# Patient Record
Sex: FEMALE | Race: WHITE | HISPANIC OR LATINO | Employment: FULL TIME | URBAN - METROPOLITAN AREA
[De-identification: names, ages, dates, MRNs, and addresses within clinical notes are randomized per-mention and may not be internally consistent; named-entity substitution may affect disease eponyms.]

---

## 2017-01-05 RX ORDER — VALACYCLOVIR HYDROCHLORIDE 1 G/1
TABLET, FILM COATED ORAL
Qty: 30 TABLET | Refills: 3 | Status: SHIPPED | OUTPATIENT
Start: 2017-01-05 | End: 2018-03-07 | Stop reason: SDUPTHER

## 2017-02-08 ENCOUNTER — APPOINTMENT (OUTPATIENT)
Dept: GENERAL RADIOLOGY | Facility: HOSPITAL | Age: 52
End: 2017-02-08

## 2017-02-08 PROCEDURE — 71020 XR CHEST 2 VW: CPT | Performed by: GENERAL PRACTICE

## 2017-02-08 PROCEDURE — 71020 HC CHEST PA AND LATERAL: CPT | Performed by: GENERAL PRACTICE

## 2017-04-04 RX ORDER — MELOXICAM 15 MG/1
TABLET ORAL
Qty: 90 TABLET | Refills: 1 | Status: CANCELLED | OUTPATIENT
Start: 2017-04-04

## 2017-04-04 RX ORDER — MELOXICAM 15 MG/1
15 TABLET ORAL DAILY
Qty: 90 TABLET | Refills: 2 | Status: SHIPPED | OUTPATIENT
Start: 2017-04-04 | End: 2017-05-09 | Stop reason: SDUPTHER

## 2017-05-09 ENCOUNTER — TELEPHONE (OUTPATIENT)
Dept: FAMILY MEDICINE CLINIC | Facility: CLINIC | Age: 52
End: 2017-05-09

## 2017-05-09 RX ORDER — CYCLOBENZAPRINE HCL 10 MG
10 TABLET ORAL 3 TIMES DAILY PRN
Qty: 270 TABLET | Refills: 0 | OUTPATIENT
Start: 2017-05-09 | End: 2019-08-06

## 2017-05-09 RX ORDER — MELOXICAM 15 MG/1
15 TABLET ORAL DAILY
Qty: 90 TABLET | Refills: 2 | Status: SHIPPED | OUTPATIENT
Start: 2017-05-09 | End: 2018-02-03 | Stop reason: SDUPTHER

## 2017-05-09 RX ORDER — CYANOCOBALAMIN 1000 UG/ML
1000 INJECTION, SOLUTION INTRAMUSCULAR; SUBCUTANEOUS 2 TIMES WEEKLY
Qty: 75 ML | Refills: 1 | Status: SHIPPED | OUTPATIENT
Start: 2017-05-11 | End: 2021-09-03 | Stop reason: SDUPTHER

## 2017-09-01 ENCOUNTER — OFFICE VISIT (OUTPATIENT)
Dept: FAMILY MEDICINE CLINIC | Facility: CLINIC | Age: 52
End: 2017-09-01

## 2017-09-01 VITALS
OXYGEN SATURATION: 98 % | HEIGHT: 66 IN | SYSTOLIC BLOOD PRESSURE: 122 MMHG | TEMPERATURE: 98.6 F | BODY MASS INDEX: 31.76 KG/M2 | HEART RATE: 78 BPM | DIASTOLIC BLOOD PRESSURE: 80 MMHG | WEIGHT: 197.6 LBS

## 2017-09-01 DIAGNOSIS — R63.5 WEIGHT GAIN: ICD-10-CM

## 2017-09-01 DIAGNOSIS — K59.00 CONSTIPATION, UNSPECIFIED CONSTIPATION TYPE: ICD-10-CM

## 2017-09-01 DIAGNOSIS — F33.0 MILD EPISODE OF RECURRENT MAJOR DEPRESSIVE DISORDER (HCC): Primary | ICD-10-CM

## 2017-09-01 PROCEDURE — 99214 OFFICE O/P EST MOD 30 MIN: CPT | Performed by: NURSE PRACTITIONER

## 2017-09-01 RX ORDER — DESVENLAFAXINE SUCCINATE 50 MG/1
50 TABLET, EXTENDED RELEASE ORAL DAILY
Qty: 30 TABLET | Refills: 6 | Status: SHIPPED | OUTPATIENT
Start: 2017-09-01 | End: 2018-03-23

## 2017-09-01 RX ORDER — PHENTERMINE HYDROCHLORIDE 37.5 MG/1
37.5 TABLET ORAL
Qty: 90 TABLET | Refills: 0 | Status: SHIPPED | OUTPATIENT
Start: 2017-09-01 | End: 2018-03-23

## 2017-09-01 NOTE — PROGRESS NOTES
"Subjective   Alecia Hayes is a 51 y.o. female.     History of Present Illness   Depression: Patient complains of depression. She complains of depressed mood, difficulty concentrating, fatigue, feelings of worthlessness/guilt, hopelessness and weight gain. Onset was approximately several months ago, unchanged since that time.  She denies current suicidal and homicidal plan or intent.   Family history significant for no psychiatric illness.Possible organic causes contributing are: none.  Risk factors: none Previous treatment includes none.    Also still experiencing constipation.  Used Linzess and it really helped but caused diarrhea at the mid level dose. OTC are not helping    Also having a large amount of weight gain and she understanding that it is related to depression.  Would like phentermine to help with this    The following portions of the patient's history were reviewed and updated as appropriate: allergies, current medications, past social history and problem list.    Review of Systems   Constitutional: Positive for unexpected weight change.   All other systems reviewed and are negative.      Objective   /80 (BP Location: Right arm, Patient Position: Sitting)  Pulse 78  Temp 98.6 °F (37 °C)  Ht 65.5\" (166.4 cm)  Wt 197 lb 9.6 oz (89.6 kg)  SpO2 98%  BMI 32.38 kg/m2  Physical Exam   Constitutional: She is oriented to person, place, and time. Vital signs are normal. She appears well-developed and well-nourished. No distress.   HENT:   Head: Normocephalic.   Cardiovascular: Normal rate, regular rhythm and normal heart sounds.    Pulmonary/Chest: Effort normal and breath sounds normal.   Neurological: She is alert and oriented to person, place, and time. Gait normal.   Psychiatric: She has a normal mood and affect. Her behavior is normal. Judgment and thought content normal.   Vitals reviewed.      Assessment/Plan   Problem List Items Addressed This Visit        Digestive    Constipation    " Relevant Medications    linaclotide (LINZESS) 72 MCG capsule capsule       Other    Weight gain    Relevant Medications    phentermine (ADIPEX-P) 37.5 MG tablet    Mild episode of recurrent major depressive disorder - Primary    Relevant Medications    desvenlafaxine (PRISTIQ) 50 MG 24 hr tablet    phentermine (ADIPEX-P) 37.5 MG tablet           rto in 3 mons

## 2017-09-11 ENCOUNTER — TELEPHONE (OUTPATIENT)
Dept: FAMILY MEDICINE CLINIC | Facility: CLINIC | Age: 52
End: 2017-09-11

## 2017-09-11 RX ORDER — SULFAMETHOXAZOLE AND TRIMETHOPRIM 800; 160 MG/1; MG/1
1 TABLET ORAL 2 TIMES DAILY
Qty: 10 TABLET | Refills: 0 | Status: SHIPPED | OUTPATIENT
Start: 2017-09-11 | End: 2018-03-23

## 2017-09-11 NOTE — TELEPHONE ENCOUNTER
Patient left message asking for an antibiotic for a UTI patient says she has talked to matt about it before

## 2017-10-04 RX ORDER — AMOXICILLIN 875 MG/1
TABLET, COATED ORAL
Qty: 20 TABLET | Refills: 0 | OUTPATIENT
Start: 2017-10-04

## 2017-10-10 ENCOUNTER — TELEPHONE (OUTPATIENT)
Dept: FAMILY MEDICINE CLINIC | Facility: CLINIC | Age: 52
End: 2017-10-10

## 2017-12-19 ENCOUNTER — TELEPHONE (OUTPATIENT)
Dept: FAMILY MEDICINE CLINIC | Facility: CLINIC | Age: 52
End: 2017-12-19

## 2017-12-19 NOTE — TELEPHONE ENCOUNTER
Patient left message stating her antidepressant is causing her to have a hard time focusing and forgetfulness. She is on pristiq she is wondering if she can be prescribed something different?

## 2017-12-20 RX ORDER — BUPROPION HYDROCHLORIDE 150 MG/1
150 TABLET ORAL DAILY
Qty: 90 TABLET | Refills: 3 | Status: SHIPPED | OUTPATIENT
Start: 2017-12-20 | End: 2018-03-23

## 2018-02-05 RX ORDER — MELOXICAM 15 MG/1
TABLET ORAL
Qty: 90 TABLET | Refills: 0 | Status: SHIPPED | OUTPATIENT
Start: 2018-02-05 | End: 2018-10-31 | Stop reason: SDUPTHER

## 2018-03-08 RX ORDER — VALACYCLOVIR HYDROCHLORIDE 1 G/1
TABLET, FILM COATED ORAL
Qty: 30 TABLET | Refills: 3 | Status: SHIPPED | OUTPATIENT
Start: 2018-03-08 | End: 2018-03-20 | Stop reason: SDUPTHER

## 2018-03-20 RX ORDER — VALACYCLOVIR HYDROCHLORIDE 1 G/1
2000 TABLET, FILM COATED ORAL EVERY 12 HOURS
Qty: 60 TABLET | Refills: 3 | Status: SHIPPED | OUTPATIENT
Start: 2018-03-20 | End: 2018-03-23

## 2018-03-23 ENCOUNTER — OFFICE VISIT (OUTPATIENT)
Dept: FAMILY MEDICINE CLINIC | Facility: CLINIC | Age: 53
End: 2018-03-23

## 2018-03-23 VITALS
WEIGHT: 199.8 LBS | HEIGHT: 66 IN | BODY MASS INDEX: 32.11 KG/M2 | OXYGEN SATURATION: 97 % | HEART RATE: 86 BPM | SYSTOLIC BLOOD PRESSURE: 142 MMHG | DIASTOLIC BLOOD PRESSURE: 84 MMHG | TEMPERATURE: 98.6 F

## 2018-03-23 DIAGNOSIS — Z00.00 ROUTINE GENERAL MEDICAL EXAMINATION AT A HEALTH CARE FACILITY: ICD-10-CM

## 2018-03-23 DIAGNOSIS — G47.00 INSOMNIA, UNSPECIFIED TYPE: ICD-10-CM

## 2018-03-23 DIAGNOSIS — Z12.39 BREAST CANCER SCREENING: ICD-10-CM

## 2018-03-23 DIAGNOSIS — R22.1 NODULE OF NECK: ICD-10-CM

## 2018-03-23 DIAGNOSIS — B00.1 COLD SORE: ICD-10-CM

## 2018-03-23 DIAGNOSIS — Z12.11 COLON CANCER SCREENING: Primary | ICD-10-CM

## 2018-03-23 DIAGNOSIS — R63.5 WEIGHT GAIN: ICD-10-CM

## 2018-03-23 DIAGNOSIS — F33.0 MILD EPISODE OF RECURRENT MAJOR DEPRESSIVE DISORDER (HCC): ICD-10-CM

## 2018-03-23 PROCEDURE — 99214 OFFICE O/P EST MOD 30 MIN: CPT | Performed by: NURSE PRACTITIONER

## 2018-03-23 RX ORDER — VALACYCLOVIR HYDROCHLORIDE 500 MG/1
500 TABLET, FILM COATED ORAL DAILY
Qty: 90 TABLET | Refills: 3 | OUTPATIENT
Start: 2018-03-23 | End: 2019-08-06

## 2018-03-23 RX ORDER — PHENTERMINE HYDROCHLORIDE 37.5 MG/1
37.5 TABLET ORAL
Qty: 90 TABLET | Refills: 0 | OUTPATIENT
Start: 2018-03-23 | End: 2019-06-09

## 2018-03-23 RX ORDER — TRAZODONE HYDROCHLORIDE 150 MG/1
150 TABLET ORAL NIGHTLY
Qty: 90 TABLET | Refills: 3 | OUTPATIENT
Start: 2018-03-23 | End: 2019-08-06

## 2018-03-23 NOTE — PROGRESS NOTES
"Subjective   Alecia Hayes is a 52 y.o. female.     History of Present Illness   About 1 month ago patient felt a bump on her left clavicle and she thinks it has been getting bigger slowly. It is tender.     she has an ongoing issue with cold sores she takes her bowel cycle of beer 2000 mg twice a day which does help get rid of it but she easily gets some right back she's had 3 this month.    She is gaining her weight back and she's requesting have her phentermine refilled to help her lose weight and work well for her last time.    She's having trouble sleeping and also with some anxiety and depression and her friend takes trazodone works well for all of those things are hurting she's wondering if that's a medication that she could try for these issues that she is having.          The following portions of the patient's history were reviewed and updated as appropriate: allergies, current medications, past social history and problem list.    Review of Systems   Constitutional: Positive for fatigue and unexpected weight change.   All other systems reviewed and are negative.      Objective   /84 (BP Location: Right arm, Patient Position: Sitting)   Pulse 86   Temp 98.6 °F (37 °C)   Ht 166.4 cm (65.51\")   Wt 90.6 kg (199 lb 12.8 oz)   SpO2 97%   BMI 32.73 kg/m²   Physical Exam   Constitutional: She is oriented to person, place, and time. Vital signs are normal. She appears well-developed and well-nourished. No distress.   HENT:   Head: Normocephalic.   Cardiovascular: Normal rate, regular rhythm and normal heart sounds.    Pulmonary/Chest: Effort normal and breath sounds normal.   Neurological: She is alert and oriented to person, place, and time. Gait normal.   Psychiatric: She has a normal mood and affect. Her behavior is normal. Judgment and thought content normal.   Vitals reviewed.      Assessment/Plan   Problem List Items Addressed This Visit        Other    Insomnia    Relevant Medications    " traZODone (DESYREL) 150 MG tablet    Cold sore    Relevant Medications    valACYclovir (VALTREX) 500 MG tablet    Weight gain    Relevant Medications    phentermine (ADIPEX-P) 37.5 MG tablet    Mild episode of recurrent major depressive disorder    Relevant Medications    traZODone (DESYREL) 150 MG tablet    phentermine (ADIPEX-P) 37.5 MG tablet    Breast cancer screening - Primary    Relevant Orders    Mammo Screening Bilateral With CAD    Routine general medical examination at a health care facility    Relevant Orders    Ambulatory Referral to Dermatology    Nodule of neck    Relevant Orders    CT soft tissue neck w contrast      Other Visit Diagnoses    None.       Schedule pap come fasting  Follow up after CT

## 2018-03-27 DIAGNOSIS — R22.1 NODULE OF NECK: Primary | ICD-10-CM

## 2018-03-30 ENCOUNTER — HOSPITAL ENCOUNTER (OUTPATIENT)
Dept: ULTRASOUND IMAGING | Facility: HOSPITAL | Age: 53
Discharge: HOME | End: 2018-03-30

## 2018-03-30 ENCOUNTER — HOSPITAL ENCOUNTER (OUTPATIENT)
Dept: MAMMOGRAPHY | Facility: HOSPITAL | Age: 53
Discharge: HOME | End: 2018-03-30
Admitting: NURSE PRACTITIONER

## 2018-03-30 DIAGNOSIS — Z12.39 BREAST CANCER SCREENING: ICD-10-CM

## 2018-03-30 PROCEDURE — 77067 SCR MAMMO BI INCL CAD: CPT

## 2018-03-30 PROCEDURE — 76536 US EXAM OF HEAD AND NECK: CPT

## 2018-04-06 ENCOUNTER — HOSPITAL ENCOUNTER (OUTPATIENT)
Dept: CT IMAGING | Facility: HOSPITAL | Age: 53
Discharge: HOME | End: 2018-04-06
Admitting: NURSE PRACTITIONER

## 2018-04-06 ENCOUNTER — PROCEDURE VISIT (OUTPATIENT)
Dept: FAMILY MEDICINE CLINIC | Facility: CLINIC | Age: 53
End: 2018-04-06

## 2018-04-06 VITALS
HEIGHT: 66 IN | DIASTOLIC BLOOD PRESSURE: 78 MMHG | BODY MASS INDEX: 31.82 KG/M2 | SYSTOLIC BLOOD PRESSURE: 122 MMHG | OXYGEN SATURATION: 98 % | TEMPERATURE: 98.6 F | RESPIRATION RATE: 14 BRPM | WEIGHT: 198 LBS | HEART RATE: 78 BPM

## 2018-04-06 DIAGNOSIS — Z13.6 SCREENING FOR ISCHEMIC HEART DISEASE: ICD-10-CM

## 2018-04-06 DIAGNOSIS — Z77.21 EXPOSURE TO BLOOD OR BODY FLUID: ICD-10-CM

## 2018-04-06 DIAGNOSIS — E61.1 IRON DEFICIENCY: ICD-10-CM

## 2018-04-06 DIAGNOSIS — Z01.419 PAP TEST, AS PART OF ROUTINE GYNECOLOGICAL EXAMINATION: ICD-10-CM

## 2018-04-06 DIAGNOSIS — Z13.1 SCREENING FOR DIABETES MELLITUS: ICD-10-CM

## 2018-04-06 DIAGNOSIS — Z83.49 FAMILY HISTORY OF HYPOTHYROIDISM: ICD-10-CM

## 2018-04-06 DIAGNOSIS — E53.8 VITAMIN B12 DEFICIENCY: ICD-10-CM

## 2018-04-06 DIAGNOSIS — R82.90 ABNORMAL URINE ODOR: Primary | ICD-10-CM

## 2018-04-06 DIAGNOSIS — R22.1 NODULE OF NECK: ICD-10-CM

## 2018-04-06 PROBLEM — Z00.00 ROUTINE GENERAL MEDICAL EXAMINATION AT A HEALTH CARE FACILITY: Status: RESOLVED | Noted: 2018-03-23 | Resolved: 2018-04-06

## 2018-04-06 PROBLEM — Z12.39 BREAST CANCER SCREENING: Status: RESOLVED | Noted: 2018-03-23 | Resolved: 2018-04-06

## 2018-04-06 PROCEDURE — 70491 CT SOFT TISSUE NECK W/DYE: CPT

## 2018-04-06 PROCEDURE — 82565 ASSAY OF CREATININE: CPT

## 2018-04-06 PROCEDURE — 25010000002 IOPAMIDOL 61 % SOLUTION: Performed by: NURSE PRACTITIONER

## 2018-04-06 PROCEDURE — 99396 PREV VISIT EST AGE 40-64: CPT | Performed by: NURSE PRACTITIONER

## 2018-04-06 RX ADMIN — IOPAMIDOL 80 ML: 612 INJECTION, SOLUTION INTRAVENOUS at 10:20

## 2018-04-06 NOTE — PROGRESS NOTES
"Subjective   Alecia Hayes is a 52 y.o. female.     History of Present Illness   Well Adult Physical: Patient here for a comprehensive physical exam.The patient reports no problems  Do you take any herbs or supplements that were not prescribed by a doctor? no Are you taking calcium supplements? no Are you taking aspirin daily? no        The following portions of the patient's history were reviewed and updated as appropriate: allergies, current medications, past social history and problem list.    Review of Systems   Genitourinary: Positive for dysuria.   All other systems reviewed and are negative.      Objective   /78 (BP Location: Left arm, Patient Position: Sitting, Cuff Size: Adult)   Pulse 78   Temp 98.6 °F (37 °C) (Oral)   Resp 14   Ht 166.4 cm (65.51\")   Wt 89.8 kg (198 lb)   SpO2 98%   BMI 32.44 kg/m²   Physical Exam   Constitutional: She is oriented to person, place, and time. She appears well-developed and well-nourished.   Neck: No thyromegaly present.   Cardiovascular: Normal rate, regular rhythm and normal heart sounds.  Exam reveals no gallop.    No murmur heard.  Pulmonary/Chest: Effort normal and breath sounds normal. She has no wheezes. She has no rales. She exhibits no tenderness. Right breast exhibits no mass, no nipple discharge and no skin change. Left breast exhibits no mass, no nipple discharge and no skin change.   Abdominal: There is no tenderness.   Genitourinary: Vagina normal and uterus normal. Pelvic exam was performed with patient supine. There is no rash or lesion on the right labia. There is no rash or lesion on the left labia. Uterus is not enlarged and not tender. Cervix exhibits no motion tenderness, no discharge and no friability. Right adnexum displays no mass, no tenderness and no fullness. Left adnexum displays no mass, no tenderness and no fullness. No erythema, tenderness or bleeding in the vagina. No vaginal discharge found.   Lymphadenopathy:        Right: No " inguinal adenopathy present.        Left: No inguinal adenopathy present.   Neurological: She is alert and oriented to person, place, and time.   Skin: Skin is warm. No rash noted.   Psychiatric: She has a normal mood and affect. Her behavior is normal. Judgment and thought content normal.   Vitals reviewed.      Assessment/Plan   Problem List Items Addressed This Visit        Digestive    Iron deficiency    Relevant Orders    CBC & Differential    Iron Profile    Vitamin B12 deficiency    Relevant Orders    Vitamin B12 & Folate       Other    Screening for ischemic heart disease    Relevant Orders    Lipid Panel With LDL / HDL Ratio    Nodule of neck    Relevant Orders    TSH    T4, Free    Abnormal urine odor - Primary    Pap test, as part of routine gynecological examination    Exposure to blood or body fluid    Relevant Orders    Hepatitis panel, acute    HIV 2 Antibody Screen w reflex    HSV 1 and 2 IgM, Abs, Indirect    HSV 1 and 2-Specific Ab, IgG    RPR    Chlamydia trachomatis, Neisseria gonorrhoeae, PCR - Urine, Urine, Clean Catch    Family history of hypothyroidism    Relevant Orders    TSH    T4, Free      Other Visit Diagnoses    None.          follow up after labs

## 2018-04-07 LAB — CREAT BLDA-MCNC: 1 MG/DL (ref 0.6–1.3)

## 2018-04-10 ENCOUNTER — TELEPHONE (OUTPATIENT)
Dept: FAMILY MEDICINE CLINIC | Facility: CLINIC | Age: 53
End: 2018-04-10

## 2018-04-10 NOTE — TELEPHONE ENCOUNTER
Patient received her ct results she is still concerned about the left side knot she feels. She wants to know if the lymph node that was found on the right side could have anything to do with what she is feeling on the left? She is wondering if she should she go to an ENT because she is not ok with just being told it is nothing on a CT scan.

## 2018-04-11 LAB
C TRACH RRNA CVX QL NAA+PROBE: NEGATIVE
CYTOLOGIST CVX/VAG CYTO: ABNORMAL
CYTOLOGY CVX/VAG DOC THIN PREP: ABNORMAL
DX ICD CODE: ABNORMAL
DX ICD CODE: ABNORMAL
FOLATE SERPL-MCNC: 6.92 NG/ML (ref 4.78–24.2)
HIV 1 & 2 AB SER-IMP: ABNORMAL
HPV I/H RISK 1 DNA CVX QL PROBE+SIG AMP: POSITIVE
N GONORRHOEA RRNA CVX QL NAA+PROBE: NEGATIVE
OTHER STN SPEC: ABNORMAL
PATH REPORT.FINAL DX SPEC: ABNORMAL
PATHOLOGIST CVX/VAG CYTO: ABNORMAL
STAT OF ADQ CVX/VAG CYTO-IMP: ABNORMAL
T4 FREE SERPL-MCNC: 1.21 NG/DL (ref 0.93–1.7)
TSH SERPL DL<=0.005 MIU/L-ACNC: 5.26 MIU/ML (ref 0.27–4.2)
VIT B12 SERPL-MCNC: >2000 PG/ML (ref 211–946)

## 2018-04-11 NOTE — TELEPHONE ENCOUNTER
The lymph node does't have anything to do with the left side and yes we can refer to ent for an opinion

## 2018-04-16 ENCOUNTER — TELEPHONE (OUTPATIENT)
Dept: FAMILY MEDICINE CLINIC | Facility: CLINIC | Age: 53
End: 2018-04-16

## 2018-04-16 LAB
ALBUMIN SERPL-MCNC: 4.6 G/DL (ref 3.5–5.2)
ALBUMIN/GLOB SERPL: 1.9 G/DL
ALP SERPL-CCNC: 93 U/L (ref 39–117)
ALT SERPL-CCNC: 17 U/L (ref 1–33)
AST SERPL-CCNC: 20 U/L (ref 1–32)
BASOPHILS # BLD AUTO: 0.05 10*3/MM3 (ref 0–0.2)
BASOPHILS NFR BLD AUTO: 0.8 % (ref 0–1.5)
BILIRUB SERPL-MCNC: 0.3 MG/DL (ref 0.1–1.2)
BUN SERPL-MCNC: 15 MG/DL (ref 6–20)
BUN/CREAT SERPL: 20.5 (ref 7–25)
C TRACH RRNA SPEC QL NAA+PROBE: NEGATIVE
CALCIUM SERPL-MCNC: 9.3 MG/DL (ref 8.6–10.5)
CHLORIDE SERPL-SCNC: 101 MMOL/L (ref 98–107)
CHOLEST SERPL-MCNC: 208 MG/DL (ref 0–200)
CO2 SERPL-SCNC: 28.2 MMOL/L (ref 22–29)
CREAT SERPL-MCNC: 0.73 MG/DL (ref 0.57–1)
EOSINOPHIL # BLD AUTO: 0.33 10*3/MM3 (ref 0–0.7)
EOSINOPHIL NFR BLD AUTO: 5.2 % (ref 0.3–6.2)
ERYTHROCYTE [DISTWIDTH] IN BLOOD BY AUTOMATED COUNT: 15 % (ref 11.7–13)
GFR SERPLBLD CREATININE-BSD FMLA CKD-EPI: 101 ML/MIN/1.73
GFR SERPLBLD CREATININE-BSD FMLA CKD-EPI: 83 ML/MIN/1.73
GLOBULIN SER CALC-MCNC: 2.4 GM/DL
GLUCOSE SERPL-MCNC: 86 MG/DL (ref 65–99)
HAV IGM SERPL QL IA: NEGATIVE
HBV CORE IGM SERPL QL IA: NEGATIVE
HBV SURFACE AG SERPL QL IA: NEGATIVE
HCT VFR BLD AUTO: 46 % (ref 35.6–45.5)
HCV AB S/CO SERPL IA: <0.1 S/CO RATIO (ref 0–0.9)
HDLC SERPL-MCNC: 81 MG/DL (ref 40–60)
HGB BLD-MCNC: 14.3 G/DL (ref 11.9–15.5)
HIV 2 AB SERPL QL IA: NEGATIVE
HSV1 IGG SER IA-ACNC: 59.9 INDEX (ref 0–0.9)
HSV1 IGM TITR SER IF: NORMAL TITER
HSV2 IGG SER IA-ACNC: <0.91 INDEX (ref 0–0.9)
HSV2 IGM TITR SER IF: NORMAL TITER
IMM GRANULOCYTES # BLD: 0 10*3/MM3 (ref 0–0.03)
IMM GRANULOCYTES NFR BLD: 0 % (ref 0–0.5)
IRON SATN MFR SERPL: 20 % (ref 20–50)
IRON SERPL-MCNC: 104 MCG/DL (ref 37–145)
LDLC SERPL CALC-MCNC: 108 MG/DL (ref 0–100)
LDLC/HDLC SERPL: 1.33 {RATIO}
LYMPHOCYTES # BLD AUTO: 1.99 10*3/MM3 (ref 0.9–4.8)
LYMPHOCYTES NFR BLD AUTO: 31.5 % (ref 19.6–45.3)
MCH RBC QN AUTO: 31.2 PG (ref 26.9–32)
MCHC RBC AUTO-ENTMCNC: 31.1 G/DL (ref 32.4–36.3)
MCV RBC AUTO: 100.4 FL (ref 80.5–98.2)
MONOCYTES # BLD AUTO: 0.51 10*3/MM3 (ref 0.2–1.2)
MONOCYTES NFR BLD AUTO: 8.1 % (ref 5–12)
N GONORRHOEA RRNA SPEC QL NAA+PROBE: NEGATIVE
NEUTROPHILS # BLD AUTO: 3.43 10*3/MM3 (ref 1.9–8.1)
NEUTROPHILS NFR BLD AUTO: 54.4 % (ref 42.7–76)
PLATELET # BLD AUTO: 288 10*3/MM3 (ref 140–500)
POTASSIUM SERPL-SCNC: 4.5 MMOL/L (ref 3.5–5.2)
PROT SERPL-MCNC: 7 G/DL (ref 6–8.5)
RBC # BLD AUTO: 4.58 10*6/MM3 (ref 3.9–5.2)
RPR SER QL: NORMAL
SODIUM SERPL-SCNC: 143 MMOL/L (ref 136–145)
TIBC SERPL-MCNC: 526 MCG/DL
TRIGL SERPL-MCNC: 95 MG/DL (ref 0–150)
UIBC SERPL-MCNC: 422 MCG/DL
VLDLC SERPL CALC-MCNC: 19 MG/DL (ref 5–40)
WBC # BLD AUTO: 6.31 10*3/MM3 (ref 4.5–10.7)

## 2018-04-17 RX ORDER — LEVOTHYROXINE SODIUM 0.03 MG/1
25 TABLET ORAL DAILY
Qty: 30 TABLET | Refills: 3 | Status: SHIPPED | OUTPATIENT
Start: 2018-04-17 | End: 2018-04-17 | Stop reason: SDUPTHER

## 2018-04-17 RX ORDER — LEVOTHYROXINE SODIUM 0.03 MG/1
25 TABLET ORAL DAILY
Qty: 30 TABLET | Refills: 3 | Status: SHIPPED | OUTPATIENT
Start: 2018-04-17 | End: 2018-08-14 | Stop reason: SDUPTHER

## 2018-04-23 DIAGNOSIS — B97.7 HPV (HUMAN PAPILLOMA VIRUS) INFECTION: Primary | ICD-10-CM

## 2018-04-23 DIAGNOSIS — R87.619 ABNORMAL CERVICAL PAPANICOLAOU SMEAR, UNSPECIFIED ABNORMAL PAP FINDING: ICD-10-CM

## 2018-04-27 ENCOUNTER — PREP FOR SURGERY (OUTPATIENT)
Dept: OTHER | Facility: HOSPITAL | Age: 53
End: 2018-04-27

## 2018-04-27 DIAGNOSIS — Z80.0 FH: COLON CANCER: Primary | ICD-10-CM

## 2018-05-01 ENCOUNTER — TELEPHONE (OUTPATIENT)
Dept: FAMILY MEDICINE CLINIC | Facility: CLINIC | Age: 53
End: 2018-05-01

## 2018-05-01 NOTE — TELEPHONE ENCOUNTER
Patient called and said that she recently seen matt and is wanting to know if matt will prescribe her xanax to take as needed because she is under a lot of stress and breaking out in cold sores and wanting something to help with her anxiety

## 2018-05-01 NOTE — TELEPHONE ENCOUNTER
Called and spoke to patient and she said that she will have to call back after she looks at the schedule

## 2018-05-18 ENCOUNTER — OUTSIDE FACILITY SERVICE (OUTPATIENT)
Dept: GASTROENTEROLOGY | Facility: CLINIC | Age: 53
End: 2018-05-18

## 2018-05-18 PROCEDURE — 45385 COLONOSCOPY W/LESION REMOVAL: CPT | Performed by: INTERNAL MEDICINE

## 2018-05-25 ENCOUNTER — TELEPHONE (OUTPATIENT)
Dept: GASTROENTEROLOGY | Facility: CLINIC | Age: 53
End: 2018-05-25

## 2018-05-29 ENCOUNTER — TELEPHONE (OUTPATIENT)
Dept: GASTROENTEROLOGY | Facility: CLINIC | Age: 53
End: 2018-05-29

## 2018-05-29 NOTE — TELEPHONE ENCOUNTER
Called pt and advised per Dr Malik that the polyp that was removed was benign and she recommends a repeat c/s in 5 yrs. Pt verb understanding.     C/s placed in recall for 05/18/2023.

## 2018-05-29 NOTE — TELEPHONE ENCOUNTER
----- Message from Cassandra Cazares RN sent at 5/29/2018  3:58 PM EDT -----  Regarding: FW: C/S REPORT   Contact: [phone]  [place] pt   ----- Message -----  From: Ruchi Krueger  Sent: 5/29/2018   3:17 PM  To: Cassandra Cazares RN, Ijeoma Benitez RN  Subject: C/S REPORT                                       PT CALLED FOR C/S PATH REPORT

## 2018-08-14 RX ORDER — LEVOTHYROXINE SODIUM 0.03 MG/1
TABLET ORAL
Qty: 30 TABLET | Refills: 2 | Status: SHIPPED | OUTPATIENT
Start: 2018-08-14 | End: 2018-11-21 | Stop reason: SDUPTHER

## 2018-10-31 RX ORDER — MELOXICAM 15 MG/1
15 TABLET ORAL DAILY
Qty: 90 TABLET | Refills: 0 | Status: SHIPPED | OUTPATIENT
Start: 2018-10-31 | End: 2021-09-03 | Stop reason: SDUPTHER

## 2018-11-26 RX ORDER — LEVOTHYROXINE SODIUM 0.03 MG/1
TABLET ORAL
Qty: 30 TABLET | Refills: 1 | Status: SHIPPED | OUTPATIENT
Start: 2018-11-26 | End: 2019-01-23 | Stop reason: SDUPTHER

## 2019-01-24 RX ORDER — LEVOTHYROXINE SODIUM 0.03 MG/1
TABLET ORAL
Qty: 30 TABLET | Refills: 0 | Status: SHIPPED | OUTPATIENT
Start: 2019-01-24 | End: 2021-08-27

## 2019-01-28 RX ORDER — LEVOTHYROXINE SODIUM 0.03 MG/1
TABLET ORAL
Qty: 30 TABLET | Refills: 0 | Status: SHIPPED | OUTPATIENT
Start: 2019-01-28 | End: 2021-08-30 | Stop reason: SDUPTHER

## 2019-04-19 ENCOUNTER — APPOINTMENT (OUTPATIENT)
Dept: WOMENS IMAGING | Facility: HOSPITAL | Age: 54
End: 2019-04-19

## 2019-04-19 PROCEDURE — 77063 BREAST TOMOSYNTHESIS BI: CPT | Performed by: RADIOLOGY

## 2019-04-19 PROCEDURE — 77067 SCR MAMMO BI INCL CAD: CPT | Performed by: RADIOLOGY

## 2019-04-19 PROCEDURE — 77080 DXA BONE DENSITY AXIAL: CPT | Performed by: RADIOLOGY

## 2019-05-31 RX ORDER — VALACYCLOVIR HYDROCHLORIDE 1 G/1
TABLET, FILM COATED ORAL
Qty: 60 TABLET | Refills: 2 | OUTPATIENT
Start: 2019-05-31 | End: 2019-08-06

## 2020-08-07 ENCOUNTER — APPOINTMENT (OUTPATIENT)
Dept: WOMENS IMAGING | Facility: HOSPITAL | Age: 55
End: 2020-08-07

## 2020-08-07 PROCEDURE — 77067 SCR MAMMO BI INCL CAD: CPT | Performed by: RADIOLOGY

## 2020-08-07 PROCEDURE — 77063 BREAST TOMOSYNTHESIS BI: CPT | Performed by: RADIOLOGY

## 2020-11-06 ENCOUNTER — HOSPITAL ENCOUNTER (EMERGENCY)
Facility: HOSPITAL | Age: 55
Discharge: LEFT BEFORE DISCHARGE | End: 2020-11-06

## 2020-11-06 VITALS
BODY MASS INDEX: 29.02 KG/M2 | SYSTOLIC BLOOD PRESSURE: 165 MMHG | RESPIRATION RATE: 18 BRPM | HEART RATE: 120 BPM | TEMPERATURE: 100 F | HEIGHT: 64 IN | WEIGHT: 169.97 LBS | OXYGEN SATURATION: 97 % | DIASTOLIC BLOOD PRESSURE: 83 MMHG

## 2020-11-06 NOTE — ED NOTES
Pt told screener that she did not have time to waiting and she was going to leave without being seen      Mami Tapia RN  11/06/20 0460

## 2021-01-24 ENCOUNTER — TRANSCRIBE ORDERS (OUTPATIENT)
Dept: ADMINISTRATIVE | Facility: HOSPITAL | Age: 56
End: 2021-01-24

## 2021-01-24 DIAGNOSIS — R91.1 LUNG NODULE: Primary | ICD-10-CM

## 2021-08-24 ENCOUNTER — TELEPHONE (OUTPATIENT)
Dept: FAMILY MEDICINE CLINIC | Facility: CLINIC | Age: 56
End: 2021-08-24

## 2021-08-24 NOTE — TELEPHONE ENCOUNTER
Caller: Alecia ROMERO A    Relationship to patient: Self    Best call back number: [phone]     Patient is needing: PATIENT IS CALLING TO STATE SHE HAS A NEW PATIENT APPOINTMENT ON 08/27/21.  SHE IS GOING TO HAVE SURGERY ON 09/14/21.  SHE IS WANTING TO KNOW IF SHE CAN GET AN EKG, CBC, AND CMP AT THAT VISIT.    PLEASE ADVISE.

## 2021-08-27 ENCOUNTER — OFFICE VISIT (OUTPATIENT)
Dept: FAMILY MEDICINE CLINIC | Facility: CLINIC | Age: 56
End: 2021-08-27

## 2021-08-27 VITALS
HEIGHT: 66 IN | WEIGHT: 194.4 LBS | OXYGEN SATURATION: 98 % | BODY MASS INDEX: 31.24 KG/M2 | DIASTOLIC BLOOD PRESSURE: 70 MMHG | TEMPERATURE: 98 F | HEART RATE: 76 BPM | SYSTOLIC BLOOD PRESSURE: 146 MMHG

## 2021-08-27 DIAGNOSIS — Z13.1 SCREENING FOR DIABETES MELLITUS: ICD-10-CM

## 2021-08-27 DIAGNOSIS — Z01.818 PREOP EXAMINATION: Primary | ICD-10-CM

## 2021-08-27 DIAGNOSIS — Z13.6 SCREENING FOR ISCHEMIC HEART DISEASE: ICD-10-CM

## 2021-08-27 DIAGNOSIS — E03.9 HYPOTHYROIDISM, UNSPECIFIED TYPE: ICD-10-CM

## 2021-08-27 DIAGNOSIS — G47.00 INSOMNIA, UNSPECIFIED TYPE: ICD-10-CM

## 2021-08-27 DIAGNOSIS — E53.8 VITAMIN B12 DEFICIENCY: ICD-10-CM

## 2021-08-27 DIAGNOSIS — Z13.0 SCREENING FOR IRON DEFICIENCY ANEMIA: ICD-10-CM

## 2021-08-27 PROBLEM — R22.1 NODULE OF NECK: Status: RESOLVED | Noted: 2018-03-23 | Resolved: 2021-08-27

## 2021-08-27 PROBLEM — Z77.21 EXPOSURE TO BLOOD OR BODY FLUID: Status: RESOLVED | Noted: 2018-04-06 | Resolved: 2021-08-27

## 2021-08-27 PROBLEM — Z83.49 FAMILY HISTORY OF HYPOTHYROIDISM: Status: RESOLVED | Noted: 2018-04-06 | Resolved: 2021-08-27

## 2021-08-27 PROBLEM — Z01.419 PAP TEST, AS PART OF ROUTINE GYNECOLOGICAL EXAMINATION: Status: RESOLVED | Noted: 2018-04-06 | Resolved: 2021-08-27

## 2021-08-27 PROBLEM — R82.90 ABNORMAL URINE ODOR: Status: RESOLVED | Noted: 2018-04-06 | Resolved: 2021-08-27

## 2021-08-27 PROCEDURE — 99213 OFFICE O/P EST LOW 20 MIN: CPT | Performed by: NURSE PRACTITIONER

## 2021-08-27 PROCEDURE — 93000 ELECTROCARDIOGRAM COMPLETE: CPT | Performed by: NURSE PRACTITIONER

## 2021-08-27 RX ORDER — PAROXETINE HYDROCHLORIDE HEMIHYDRATE 12.5 MG/1
12.5 TABLET, FILM COATED, EXTENDED RELEASE ORAL DAILY
COMMUNITY
Start: 2021-06-11 | End: 2021-09-03 | Stop reason: SDUPTHER

## 2021-08-27 RX ORDER — TRAZODONE HYDROCHLORIDE 150 MG/1
150 TABLET ORAL NIGHTLY
Qty: 90 TABLET | Refills: 3 | Status: SHIPPED | OUTPATIENT
Start: 2021-08-27 | End: 2022-06-08

## 2021-08-27 RX ORDER — NITROFURANTOIN 25; 75 MG/1; MG/1
100 CAPSULE ORAL AS NEEDED
COMMUNITY
End: 2021-11-05

## 2021-08-27 NOTE — PROGRESS NOTES
"Chief Complaint  Pre-op Exam (Patient is wanting to establish primary care. She is having breast surgery and lipo on her chin she is needing an ekg, cbc, cmp ( wore mask and goggles) )    Subjective          Alecia GAYTAN YURYGARY presents to [facility]  History of Present Illness  Patient reestablishing care with me today.  I last saw her in March 2018.    Pt presenting to the office today for surgery clearance.  Breast implants replaced and varghese facelift planned on 9/17/21 by surgeon Dr. Walters.  Recent weight has been stable. Patient experiencing fear or anxiety regarding surgery: no concerns .  Significant medical history: none    Insomnia- needing trazodone refilled which she uses on a PRN basis for insomnia and works well    Objective   Vital Signs:   /70   Pulse 76   Temp 98 °F (36.7 °C)   Ht 167.6 cm (66\")   Wt 88.2 kg (194 lb 6.4 oz)   SpO2 98%   BMI 31.38 kg/m²     Physical Exam  Vitals reviewed.   Constitutional:       General: She is not in acute distress.     Appearance: She is well-developed. She is not diaphoretic.   HENT:      Head: Normocephalic and atraumatic. Hair is normal.      Right Ear: Hearing, tympanic membrane, ear canal and external ear normal. No decreased hearing noted. No drainage.      Left Ear: Hearing, tympanic membrane, ear canal and external ear normal. No decreased hearing noted.      Nose: No nasal deformity.   Eyes:      General: Lids are normal.         Right eye: No discharge.         Left eye: No discharge.      Conjunctiva/sclera: Conjunctivae normal.      Pupils: Pupils are equal, round, and reactive to light.   Neck:      Thyroid: No thyromegaly.      Vascular: No JVD.      Trachea: No tracheal deviation.   Cardiovascular:      Rate and Rhythm: Normal rate and regular rhythm.      Pulses: Normal pulses.      Heart sounds: Normal heart sounds. No murmur heard.   No friction rub. No gallop.    Pulmonary:      Effort: Pulmonary " effort is normal. No respiratory distress.      Breath sounds: Normal breath sounds. No wheezing or rales.   Chest:      Chest wall: No tenderness.   Abdominal:      General: Bowel sounds are normal. There is no distension.      Palpations: Abdomen is soft. There is no mass.      Tenderness: There is no abdominal tenderness. There is no guarding or rebound.      Hernia: No hernia is present.   Musculoskeletal:         General: No tenderness or deformity. Normal range of motion.      Cervical back: Normal range of motion and neck supple.   Lymphadenopathy:      Cervical: No cervical adenopathy.   Skin:     General: Skin is warm and dry.      Findings: No erythema or rash.   Neurological:      Mental Status: She is alert and oriented to person, place, and time.      Cranial Nerves: No cranial nerve deficit.      Motor: No abnormal muscle tone.      Coordination: Coordination normal.      Deep Tendon Reflexes: Reflexes are normal and symmetric. Reflexes normal.   Psychiatric:         Behavior: Behavior normal.         Thought Content: Thought content normal.         Judgment: Judgment normal.        Result Review :            ECG 12 Lead    Date/Time: 8/27/2021 10:02 AM  Performed by: Adan Campbell APRN  Authorized by: Adan Campbell APRN   Comparison: compared with previous ECG from 2/8/2017  Similar to previous ECG  Rhythm: sinus rhythm  Rate: normal  Conduction: conduction normal  ST Segments: ST segments normal  T Waves: T waves normal  QRS axis: normal  Other: no other findings    Clinical impression: normal ECG              Assessment and Plan    Diagnoses and all orders for this visit:    1. Preop examination (Primary)  -     ECG 12 Lead    2. Hypothyroidism, unspecified type  -     TSH    3. Screening for iron deficiency anemia  -     CBC & Differential    4. Screening for diabetes mellitus  -     Comprehensive Metabolic Panel    5. Screening for ischemic heart disease  -     Lipid Panel With LDL / HDL  Ratio    6. Vitamin B12 deficiency  -     Vitamin B12 & Folate    7. Insomnia, unspecified type  -     traZODone (DESYREL) 150 MG tablet; Take 1 tablet by mouth Every Night.  Dispense: 90 tablet; Refill: 3        Follow Up   Return if symptoms worsen or fail to improve.  Patient was given instructions and counseling regarding her condition or for health maintenance advice. Please see specific information pulled into the AVS if appropriate.     Follow up after labs   rto prn    Mask and googles worn  Will clear for surgery after seeing results

## 2021-08-28 LAB
ALBUMIN SERPL-MCNC: 4.7 G/DL (ref 3.5–5.2)
ALBUMIN/GLOB SERPL: 1.8 G/DL
ALP SERPL-CCNC: 87 U/L (ref 39–117)
ALT SERPL-CCNC: 30 U/L (ref 1–33)
AST SERPL-CCNC: 44 U/L (ref 1–32)
BASOPHILS # BLD AUTO: 0.06 10*3/MM3 (ref 0–0.2)
BASOPHILS NFR BLD AUTO: 1 % (ref 0–1.5)
BILIRUB SERPL-MCNC: 0.3 MG/DL (ref 0–1.2)
BUN SERPL-MCNC: 10 MG/DL (ref 6–20)
BUN/CREAT SERPL: 14.3 (ref 7–25)
CALCIUM SERPL-MCNC: 9.8 MG/DL (ref 8.6–10.5)
CHLORIDE SERPL-SCNC: 102 MMOL/L (ref 98–107)
CHOLEST SERPL-MCNC: 253 MG/DL (ref 0–200)
CO2 SERPL-SCNC: 27.5 MMOL/L (ref 22–29)
CREAT SERPL-MCNC: 0.7 MG/DL (ref 0.57–1)
EOSINOPHIL # BLD AUTO: 0.15 10*3/MM3 (ref 0–0.4)
EOSINOPHIL NFR BLD AUTO: 2.5 % (ref 0.3–6.2)
ERYTHROCYTE [DISTWIDTH] IN BLOOD BY AUTOMATED COUNT: 15.8 % (ref 12.3–15.4)
FOLATE SERPL-MCNC: 9.22 NG/ML (ref 4.78–24.2)
GLOBULIN SER CALC-MCNC: 2.6 GM/DL
GLUCOSE SERPL-MCNC: 83 MG/DL (ref 65–99)
HCT VFR BLD AUTO: 37.7 % (ref 34–46.6)
HDLC SERPL-MCNC: 120 MG/DL (ref 40–60)
HGB BLD-MCNC: 11.8 G/DL (ref 12–15.9)
IMM GRANULOCYTES # BLD AUTO: 0.01 10*3/MM3 (ref 0–0.05)
IMM GRANULOCYTES NFR BLD AUTO: 0.2 % (ref 0–0.5)
LDLC SERPL CALC-MCNC: 122 MG/DL (ref 0–100)
LDLC/HDLC SERPL: 0.99 {RATIO}
LYMPHOCYTES # BLD AUTO: 1.28 10*3/MM3 (ref 0.7–3.1)
LYMPHOCYTES NFR BLD AUTO: 21.4 % (ref 19.6–45.3)
MCH RBC QN AUTO: 26.1 PG (ref 26.6–33)
MCHC RBC AUTO-ENTMCNC: 31.3 G/DL (ref 31.5–35.7)
MCV RBC AUTO: 83.4 FL (ref 79–97)
MONOCYTES # BLD AUTO: 0.57 10*3/MM3 (ref 0.1–0.9)
MONOCYTES NFR BLD AUTO: 9.5 % (ref 5–12)
NEUTROPHILS # BLD AUTO: 3.9 10*3/MM3 (ref 1.7–7)
NEUTROPHILS NFR BLD AUTO: 65.4 % (ref 42.7–76)
NRBC BLD AUTO-RTO: 0 /100 WBC (ref 0–0.2)
PLATELET # BLD AUTO: 361 10*3/MM3 (ref 140–450)
POTASSIUM SERPL-SCNC: 4.8 MMOL/L (ref 3.5–5.2)
PROT SERPL-MCNC: 7.3 G/DL (ref 6–8.5)
RBC # BLD AUTO: 4.52 10*6/MM3 (ref 3.77–5.28)
SODIUM SERPL-SCNC: 142 MMOL/L (ref 136–145)
TRIGL SERPL-MCNC: 69 MG/DL (ref 0–150)
TSH SERPL DL<=0.005 MIU/L-ACNC: 1.83 UIU/ML (ref 0.27–4.2)
VIT B12 SERPL-MCNC: 1075 PG/ML (ref 211–946)
VLDLC SERPL CALC-MCNC: 11 MG/DL (ref 5–40)
WBC # BLD AUTO: 5.97 10*3/MM3 (ref 3.4–10.8)

## 2021-08-30 DIAGNOSIS — D64.9 LOW HEMOGLOBIN: Primary | ICD-10-CM

## 2021-08-30 RX ORDER — LEVOTHYROXINE SODIUM 0.03 MG/1
25 TABLET ORAL DAILY
Qty: 90 TABLET | Refills: 1 | Status: SHIPPED | OUTPATIENT
Start: 2021-08-30 | End: 2021-12-28 | Stop reason: SDUPTHER

## 2021-09-03 ENCOUNTER — HOSPITAL ENCOUNTER (OUTPATIENT)
Dept: CT IMAGING | Facility: HOSPITAL | Age: 56
Discharge: HOME | End: 2021-09-03
Admitting: INTERNAL MEDICINE

## 2021-09-03 ENCOUNTER — TELEPHONE (OUTPATIENT)
Dept: FAMILY MEDICINE CLINIC | Facility: CLINIC | Age: 56
End: 2021-09-03

## 2021-09-03 DIAGNOSIS — R91.1 LUNG NODULE: ICD-10-CM

## 2021-09-03 PROCEDURE — 71250 CT THORAX DX C-: CPT

## 2021-09-03 RX ORDER — MELOXICAM 15 MG/1
15 TABLET ORAL DAILY
Qty: 90 TABLET | Refills: 3 | Status: SHIPPED | OUTPATIENT
Start: 2021-09-03

## 2021-09-03 RX ORDER — PAROXETINE HYDROCHLORIDE HEMIHYDRATE 12.5 MG/1
12.5 TABLET, FILM COATED, EXTENDED RELEASE ORAL DAILY
Qty: 90 TABLET | Refills: 3 | Status: SHIPPED | OUTPATIENT
Start: 2021-09-03 | End: 2021-11-05

## 2021-09-03 RX ORDER — CYANOCOBALAMIN 1000 UG/ML
1000 INJECTION, SOLUTION INTRAMUSCULAR; SUBCUTANEOUS 2 TIMES WEEKLY
Qty: 75 ML | Refills: 1 | Status: SHIPPED | OUTPATIENT
Start: 2021-09-06

## 2021-09-03 RX ORDER — VALACYCLOVIR HYDROCHLORIDE 500 MG/1
500 TABLET, FILM COATED ORAL DAILY
Qty: 90 TABLET | Refills: 3 | Status: SHIPPED | OUTPATIENT
Start: 2021-09-03

## 2021-09-03 NOTE — TELEPHONE ENCOUNTER
"Rx Refill Note  Requested Prescriptions     Pending Prescriptions Disp Refills   • PARoxetine CR (PAXIL-CR) 12.5 MG 24 hr tablet 90 tablet 3     Sig: Take 1 tablet by mouth Daily.   • meloxicam (MOBIC) 15 MG tablet 90 tablet 3     Sig: Take 1 tablet by mouth Daily.   • cyanocobalamin 1000 MCG/ML injection 75 mL 1     Sig: Inject 1 mL into the appropriate muscle as directed by prescriber 2 (Two) Times a Week.   • Syringe/Needle, Disp, 23G X 1\" 3 ML misc 100 each 3     Sig: Use to inject vitamin b12 weekly      Last office visit with prescribing clinician: 8/27/2021      Next office visit with prescribing clinician: 10/1/2021            Selam Ramirez MA  09/03/21, 13:20 EDT  "

## 2021-09-03 NOTE — TELEPHONE ENCOUNTER
Caller: Alecia ROMERO    Relationship: Self    Best call back number: [phone]*    What is the best time to reach you: ANYTIME    Who are you requesting to speak with (clinical staff, provider,  specific staff member): CLINICAL STAFF MEMBER    What was the call regarding: PATIENT CALLED AND WANTED TO KNOW IF LOUANN HAYNES HAS SENT HER MEDICATION REFILL FOR levothyroxine (SYNTHROID, LEVOTHROID) 25 MCG tablet, TO [facility].    Do you require a callback: YES

## 2021-09-06 LAB
Lab: NORMAL
Lab: NORMAL
VERBAL ADD-ON: NORMAL

## 2021-09-09 LAB
FERRITIN SERPL-MCNC: 13.6 NG/ML (ref 13–150)
IRON SERPL-MCNC: 35 MCG/DL (ref 37–145)
WRITTEN AUTHORIZATION: NORMAL

## 2021-09-13 ENCOUNTER — TELEPHONE (OUTPATIENT)
Dept: FAMILY MEDICINE CLINIC | Facility: CLINIC | Age: 56
End: 2021-09-13

## 2021-09-13 NOTE — TELEPHONE ENCOUNTER
ISAEL WITH [facility] IS REQUESTING THE PATIENT'S MOST RECENT EKG AND LABS.  INFORMATION CAN BE FAXED [phone]

## 2021-09-23 ENCOUNTER — TRANSCRIBE ORDERS (OUTPATIENT)
Dept: ADMINISTRATIVE | Facility: HOSPITAL | Age: 56
End: 2021-09-23

## 2021-09-23 DIAGNOSIS — R91.1 LUNG NODULE: Primary | ICD-10-CM

## 2021-11-02 ENCOUNTER — TELEPHONE (OUTPATIENT)
Dept: FAMILY MEDICINE CLINIC | Facility: CLINIC | Age: 56
End: 2021-11-02

## 2021-11-02 NOTE — TELEPHONE ENCOUNTER
Caller: Alecia Patel    Relationship to patient: Self    Best call back number: [phone]    Patient is needing: PATIENT STATES SHE IS TAKING PAXIL FOR ANXIETY AND SHE IS WAKING UP IN THE MIDDLE OF THE NIGHT FRANTIC WHILE ON THE MEDICATION, SHE STATES THAT A FRIEND GAVE HER A XANAX LAST NIGHT 11- AND IT HELPED HER A LOT. SHE WOKE UP WITH NO ANXIETY AND IS WANTING TO KNOW IF SHE CAN GET SWITCHED TO XANAX. PATIENT STATES SHE HAS PROBLEMS WITH INSOMNIA. PLEASE CALL AND ADVISE.

## 2021-11-05 ENCOUNTER — OFFICE VISIT (OUTPATIENT)
Dept: FAMILY MEDICINE CLINIC | Facility: CLINIC | Age: 56
End: 2021-11-05

## 2021-11-05 VITALS
DIASTOLIC BLOOD PRESSURE: 82 MMHG | TEMPERATURE: 98.4 F | HEIGHT: 66 IN | WEIGHT: 191.2 LBS | SYSTOLIC BLOOD PRESSURE: 152 MMHG | BODY MASS INDEX: 30.73 KG/M2 | HEART RATE: 87 BPM | OXYGEN SATURATION: 99 %

## 2021-11-05 DIAGNOSIS — G47.00 INSOMNIA, UNSPECIFIED TYPE: ICD-10-CM

## 2021-11-05 DIAGNOSIS — R63.5 WEIGHT GAIN: ICD-10-CM

## 2021-11-05 DIAGNOSIS — F41.9 ANXIETY: Primary | ICD-10-CM

## 2021-11-05 DIAGNOSIS — E03.9 HYPOTHYROIDISM, UNSPECIFIED TYPE: ICD-10-CM

## 2021-11-05 PROCEDURE — 99214 OFFICE O/P EST MOD 30 MIN: CPT | Performed by: NURSE PRACTITIONER

## 2021-11-05 RX ORDER — PHENTERMINE AND TOPIRAMATE 3.75; 23 MG/1; MG/1
3.75 CAPSULE, EXTENDED RELEASE ORAL DAILY
Qty: 30 CAPSULE | Refills: 0 | Status: SHIPPED | OUTPATIENT
Start: 2021-11-05 | End: 2021-12-14

## 2021-11-05 RX ORDER — HYDROXYZINE HYDROCHLORIDE 25 MG/1
25 TABLET, FILM COATED ORAL 3 TIMES DAILY PRN
Qty: 60 TABLET | Refills: 0 | Status: SHIPPED | OUTPATIENT
Start: 2021-11-05 | End: 2022-06-08

## 2021-11-05 RX ORDER — PAROXETINE HYDROCHLORIDE HEMIHYDRATE 25 MG/1
25 TABLET, FILM COATED, EXTENDED RELEASE ORAL EVERY MORNING
Qty: 30 TABLET | Refills: 3 | Status: SHIPPED | OUTPATIENT
Start: 2021-11-05 | End: 2021-11-29

## 2021-11-05 NOTE — PROGRESS NOTES
"Chief Complaint  Anxiety (Patient is needing to discuss anxiety medication ( wore mask and goggles) ) and Weight Loss (Patient wanted to discuss weight loss medication )    Subjective          Alecia Hunter presents to [facility]  History of Present Illness  Anxiety-patient is currently on Paxil CR 12.5 mg daily as directed. Having terrible anxiety and waking up at 4am shaking and jittery. Used her friends Xanax and is requesting a prescription.     Insomnia-patient is currently on trazodone 150 mg nightly for insomnia working well to control her issues.    Hypothyroidism-patient is currently on levothyroxine 25 mcg daily as directed any side effects not currently experiencing any symptoms of hypothyroidism at this time.  TSH was last checked in August 2021 with result of 1.83.    Weight gain-patient's BMI of 30.9 today. Pt would like to get on a diet pill to help with weight loss.       Objective   Vital Signs:   /82   Pulse 87   Temp 98.4 °F (36.9 °C)   Ht 167.6 cm (65.98\")   Wt 86.7 kg (191 lb 3.2 oz)   SpO2 99%   BMI 30.88 kg/m²     Physical Exam  Vitals reviewed.   Constitutional:       General: She is not in acute distress.     Appearance: She is well-developed.   HENT:      Head: Normocephalic.   Cardiovascular:      Rate and Rhythm: Normal rate and regular rhythm.      Heart sounds: Normal heart sounds.   Pulmonary:      Effort: Pulmonary effort is normal.      Breath sounds: Normal breath sounds.   Neurological:      Mental Status: She is alert and oriented to person, place, and time.      Gait: Gait normal.   Psychiatric:         Behavior: Behavior normal.         Thought Content: Thought content normal.         Judgment: Judgment normal.        Result Review :   The following data was reviewed by: ASHLEY Mcelroy on 11/05/2021:  CMP    CMP 8/27/21   Glucose 83   BUN 10   Creatinine 0.70   eGFR Non  Am 87   eGFR African Am 105   Sodium 142 "   Potassium 4.8   Chloride 102   Calcium 9.8   Total Protein 7.3   Albumin 4.70   Globulin 2.6   Total Bilirubin 0.3   Alkaline Phosphatase 87   AST (SGOT) 44 (A)   ALT (SGPT) 30   (A) Abnormal value       Comments are available for some flowsheets but are not being displayed.           CBC w/diff    CBC w/Diff 8/27/21   WBC 5.97   RBC 4.52   Hemoglobin 11.8 (A)   Hematocrit 37.7   MCV 83.4   MCH 26.1 (A)   MCHC 31.3 (A)   RDW 15.8 (A)   Platelets 361   Neutrophil Rel % 65.4   Lymphocyte Rel % 21.4   Monocyte Rel % 9.5   Eosinophil Rel % 2.5   Basophil Rel % 1.0   (A) Abnormal value            Lipid Panel    Lipid Panel 8/27/21   Total Cholesterol 253 (A)   Triglycerides 69   HDL Cholesterol 120 (A)   VLDL Cholesterol 11   LDL Cholesterol  122 (A)   LDL/HDL Ratio 0.99   (A) Abnormal value       Comments are available for some flowsheets but are not being displayed.           TSH    TSH 8/27/21   TSH 1.830                         Assessment and Plan    Diagnoses and all orders for this visit:    1. Anxiety (Primary)  -     hydrOXYzine (ATARAX) 25 MG tablet; Take 1 tablet by mouth 3 (Three) Times a Day As Needed for Anxiety.  Dispense: 60 tablet; Refill: 0  -     PARoxetine CR (Paxil CR) 25 MG 24 hr tablet; Take 1 tablet by mouth Every Morning.  Dispense: 30 tablet; Refill: 3    2. Insomnia, unspecified type    3. Hypothyroidism, unspecified type    4. Weight gain  -     Phentermine-Topiramate (Qsymia) 3.75-23 MG capsule sustained-release 24 hr; Take 3.75 mg by mouth Daily.  Dispense: 30 capsule; Refill: 0        Follow Up   Return in about 3 months (around 2/5/2022) for Recheck.  Patient was given instructions and counseling regarding her condition or for health maintenance advice. Please see specific information pulled into the AVS if appropriate.     Rt in 3 mons   Increase paxil to 25mg  Add hydroxyzine for prn use  Call in 1 month for Qsymia dose increase     Mask and googles worn

## 2021-11-28 DIAGNOSIS — F41.9 ANXIETY: ICD-10-CM

## 2021-11-29 RX ORDER — PAROXETINE HYDROCHLORIDE HEMIHYDRATE 25 MG/1
TABLET, FILM COATED, EXTENDED RELEASE ORAL
Qty: 30 TABLET | Refills: 3 | Status: SHIPPED | OUTPATIENT
Start: 2021-11-29 | End: 2022-03-07

## 2021-12-14 ENCOUNTER — OFFICE VISIT (OUTPATIENT)
Dept: FAMILY MEDICINE CLINIC | Facility: CLINIC | Age: 56
End: 2021-12-14

## 2021-12-14 VITALS
HEIGHT: 66 IN | OXYGEN SATURATION: 98 % | BODY MASS INDEX: 30.6 KG/M2 | HEART RATE: 83 BPM | WEIGHT: 190.4 LBS | TEMPERATURE: 97.1 F | DIASTOLIC BLOOD PRESSURE: 90 MMHG | SYSTOLIC BLOOD PRESSURE: 168 MMHG

## 2021-12-14 DIAGNOSIS — M25.551 BILATERAL HIP PAIN: ICD-10-CM

## 2021-12-14 DIAGNOSIS — M25.552 BILATERAL HIP PAIN: ICD-10-CM

## 2021-12-14 DIAGNOSIS — R63.5 WEIGHT GAIN: Primary | ICD-10-CM

## 2021-12-14 PROCEDURE — 99213 OFFICE O/P EST LOW 20 MIN: CPT | Performed by: NURSE PRACTITIONER

## 2021-12-14 RX ORDER — PHENTERMINE AND TOPIRAMATE 7.5; 46 MG/1; MG/1
7.5 CAPSULE, EXTENDED RELEASE ORAL DAILY
Qty: 30 CAPSULE | Refills: 0 | Status: SHIPPED | OUTPATIENT
Start: 2021-12-14 | End: 2022-01-25 | Stop reason: SDUPTHER

## 2021-12-14 NOTE — PROGRESS NOTES
"Chief Complaint  Weight Gain (Patient is here to f/u on qsymia ( wore mask and goggles) )    Subjective          Alecia Hunter presents to [facility]  History of Present Illness  Patient presenting to the office today to get a refill on increased dosage of qsymia.  She is been taking as directed for a month needs increased to the next dose which is 7.5 mg.    Also she has been experiencing some bilateral hip pain and requesting referral to ortho  Objective   Vital Signs:   /90   Pulse 83   Temp 97.1 °F (36.2 °C)   Ht 167.6 cm (65.98\")   Wt 86.4 kg (190 lb 6.4 oz)   SpO2 98%   BMI 30.75 kg/m²     Physical Exam  Vitals reviewed.   Constitutional:       General: She is not in acute distress.     Appearance: She is well-developed.   HENT:      Head: Normocephalic.   Cardiovascular:      Rate and Rhythm: Normal rate and regular rhythm.      Heart sounds: Normal heart sounds.   Pulmonary:      Effort: Pulmonary effort is normal.      Breath sounds: Normal breath sounds.   Neurological:      Mental Status: She is alert and oriented to person, place, and time.      Gait: Gait normal.   Psychiatric:         Behavior: Behavior normal.         Thought Content: Thought content normal.         Judgment: Judgment normal.        Result Review :   The following data was reviewed by: ASHLEY Mcelroy on 12/14/2021:  CMP    CMP 8/27/21   Glucose 83   BUN 10   Creatinine 0.70   eGFR Non  Am 87   eGFR African Am 105   Sodium 142   Potassium 4.8   Chloride 102   Calcium 9.8   Total Protein 7.3   Albumin 4.70   Globulin 2.6   Total Bilirubin 0.3   Alkaline Phosphatase 87   AST (SGOT) 44 (A)   ALT (SGPT) 30   (A) Abnormal value       Comments are available for some flowsheets but are not being displayed.           CBC w/diff    CBC w/Diff 8/27/21   WBC 5.97   RBC 4.52   Hemoglobin 11.8 (A)   Hematocrit 37.7   MCV 83.4   MCH 26.1 (A)   MCHC 31.3 (A)   RDW 15.8 (A)   Platelets " 361   Neutrophil Rel % 65.4   Lymphocyte Rel % 21.4   Monocyte Rel % 9.5   Eosinophil Rel % 2.5   Basophil Rel % 1.0   (A) Abnormal value            Lipid Panel    Lipid Panel 8/27/21   Total Cholesterol 253 (A)   Triglycerides 69   HDL Cholesterol 120 (A)   VLDL Cholesterol 11   LDL Cholesterol  122 (A)   LDL/HDL Ratio 0.99   (A) Abnormal value       Comments are available for some flowsheets but are not being displayed.           TSH    TSH 8/27/21   TSH 1.830                         Assessment and Plan    Diagnoses and all orders for this visit:    1. Weight gain (Primary)  -     Phentermine-Topiramate (Qsymia) 7.5-46 MG capsule sustained-release 24 hr; Take 7.5 mg by mouth Daily.  Dispense: 30 capsule; Refill: 0    2. Bilateral hip pain  -     Ambulatory Referral to Orthopedic Surgery        Follow Up   Return in about 3 months (around 3/14/2022) for Recheck.  Patient was given instructions and counseling regarding her condition or for health maintenance advice. Please see specific information pulled into the AVS if appropriate.     rto 3 mons   Referral to ortho for hip pain  Mask and googles worn

## 2021-12-28 RX ORDER — LEVOTHYROXINE SODIUM 0.03 MG/1
25 TABLET ORAL DAILY
Qty: 90 TABLET | Refills: 1 | Status: SHIPPED | OUTPATIENT
Start: 2021-12-28 | End: 2022-04-15 | Stop reason: SDUPTHER

## 2022-01-25 DIAGNOSIS — R63.5 WEIGHT GAIN: ICD-10-CM

## 2022-01-25 RX ORDER — PHENTERMINE AND TOPIRAMATE 7.5; 46 MG/1; MG/1
7.5 CAPSULE, EXTENDED RELEASE ORAL DAILY
Qty: 30 CAPSULE | Refills: 0 | Status: SHIPPED | OUTPATIENT
Start: 2022-01-25 | End: 2022-03-07 | Stop reason: SDUPTHER

## 2022-01-25 NOTE — TELEPHONE ENCOUNTER
Pt last evaluated for Qsymia on 12/14/21. Pt requesting refill to be authorized and sent to [facility] in [facility] [place]. next scheduled evaluation is on 2/4/22. Please advise.

## 2022-02-11 ENCOUNTER — OFFICE VISIT (OUTPATIENT)
Dept: ORTHOPEDIC SURGERY | Facility: CLINIC | Age: 57
End: 2022-02-11

## 2022-02-11 VITALS — BODY MASS INDEX: 29.73 KG/M2 | HEIGHT: 66 IN | WEIGHT: 185 LBS

## 2022-02-11 DIAGNOSIS — M25.551 RIGHT HIP PAIN: ICD-10-CM

## 2022-02-11 DIAGNOSIS — M25.552 LEFT HIP PAIN: ICD-10-CM

## 2022-02-11 DIAGNOSIS — R52 PAIN: Primary | ICD-10-CM

## 2022-02-11 PROCEDURE — 73521 X-RAY EXAM HIPS BI 2 VIEWS: CPT | Performed by: NURSE PRACTITIONER

## 2022-02-11 PROCEDURE — 99213 OFFICE O/P EST LOW 20 MIN: CPT | Performed by: NURSE PRACTITIONER

## 2022-02-11 RX ORDER — METHYLPREDNISOLONE 4 MG/1
TABLET ORAL
Qty: 21 TABLET | Refills: 0 | Status: SHIPPED | OUTPATIENT
Start: 2022-02-11 | End: 2022-06-08

## 2022-02-11 RX ORDER — PAROXETINE HYDROCHLORIDE HEMIHYDRATE 12.5 MG/1
TABLET, FILM COATED, EXTENDED RELEASE ORAL
COMMUNITY
Start: 2022-01-04 | End: 2022-06-08 | Stop reason: HOSPADM

## 2022-02-11 NOTE — PROGRESS NOTES
"Patient: Alecia Hunter  YOB: 1965 56 y.o. female  Medical Record Number: [id number]    Chief Complaints:   Chief Complaint   Patient presents with   • Left Hip - New Patient, Pain   • Right Hip - New Patient, Pain       History of Present Illness:Alecia Hunter is a 56 y.o. female who presents as a new patient both myself as well as to the practice with complaints of bilateral posterior hip pain.  Patient reports it started about 3 months ago.  She denies any injury.  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  She denies any radicular symptoms.  Denies any groin pain.  She does take scheduled meloxicam.    Allergies: No Known Allergies    Medications:   Current Outpatient Medications   Medication Sig Dispense Refill   • cyanocobalamin 1000 MCG/ML injection Inject 1 mL into the appropriate muscle as directed by prescriber 2 (Two) Times a Week. 75 mL 1   • levothyroxine (SYNTHROID, LEVOTHROID) 25 MCG tablet Take 1 tablet by mouth Daily. 200001 90 tablet 1   • MAGNESIUM PO Take  by mouth.     • meloxicam (MOBIC) 15 MG tablet Take 1 tablet by mouth Daily. 90 tablet 3   • MULTIPLE VITAMINS PO Take by mouth.     • PARoxetine CR (PAXIL-CR) 25 MG 24 hr tablet TAKE 1 TABLET BY MOUTH EVERY DAY IN THE MORNING 30 tablet 3   • Phentermine-Topiramate (Qsymia) 7.5-46 MG capsule sustained-release 24 hr Take 7.5 mg by mouth Daily. 30 capsule 0   • valACYclovir (Valtrex) 500 MG tablet Take 1 tablet by mouth Daily. 90 tablet 3   • Cholecalciferol (VITAMIN D3 PO) Take 2,000 Units by mouth.     • ELDERBERRY PO Take  by mouth.     • hydrOXYzine (ATARAX) 25 MG tablet Take 1 tablet by mouth 3 (Three) Times a Day As Needed for Anxiety. 60 tablet 0   • PARoxetine CR (PAXIL-CR) 12.5 MG 24 hr tablet      • Syringe/Needle, Disp, 23G X 1\" 3 ML misc Use to inject vitamin b12 weekly 100 each 3   • traZODone (DESYREL) 150 MG tablet Take 1 tablet by mouth " "Every Night. 90 tablet 3     No current facility-administered medications for this visit.         The following portions of the patient's history were reviewed and updated as appropriate: allergies, current medications, past family history, past medical history, past social history, past surgical history and problem list.    Review of Systems:   A 14 point review of systems was performed. All systems negative except pertinent positives/negative listed in HPI above    Physical Exam:   Vitals:    02/11/22 1302   Weight: 83.9 kg (185 lb)   Height: 166.4 cm (65.5\")       General: A and O x 3, ASA, NAD    SCLERA:    Normal    Skin clear no unusual lesions noted  Bilateral hips patient is tender over piriformis insertion she has normal internal and external rotation with a negative Stinchfield negative logroll calf is soft and nontender    Radiology:  Xrays 2 views of bilateral hips were ordered and reviewed today secondary to pain and show no acute abnormalities.  No compared to views available    Assessment/Plan: Bilateral hip pain secondary to piriformis syndrome    Patient and I discussed options, I will refer to outpatient physical therapy, Medrol Dosepak, continue meloxicam, and she will let me know if her symptoms do not improve      Sandi English, APRN  2/11/2022  "

## 2022-03-03 ENCOUNTER — TELEPHONE (OUTPATIENT)
Dept: FAMILY MEDICINE CLINIC | Facility: CLINIC | Age: 57
End: 2022-03-03

## 2022-03-03 NOTE — TELEPHONE ENCOUNTER
Pt called because she tested positive for Covid 19 today via an at home test.   She has congestion, achy, it is in her chest, headaches, and coughing     Her Physical Therapist gave her a pack of   Methylprednisolome 4MG but that was for her hip pain.     She would like to know if this would help with her Covid symptoms

## 2022-03-07 DIAGNOSIS — R63.5 WEIGHT GAIN: ICD-10-CM

## 2022-03-07 DIAGNOSIS — F41.9 ANXIETY: ICD-10-CM

## 2022-03-07 RX ORDER — PAROXETINE HYDROCHLORIDE HEMIHYDRATE 25 MG/1
TABLET, FILM COATED, EXTENDED RELEASE ORAL
Qty: 30 TABLET | Refills: 0 | Status: SHIPPED | OUTPATIENT
Start: 2022-03-07 | End: 2022-06-08 | Stop reason: HOSPADM

## 2022-03-07 NOTE — TELEPHONE ENCOUNTER
Caller: Alecia Patel    Relationship: Self    Best call back number: [phone]    Requested Prescriptions:   Requested Prescriptions     Pending Prescriptions Disp Refills   • Phentermine-Topiramate (Qsymia) 7.5-46 MG capsule sustained-release 24 hr 30 capsule 0     Sig: Take 7.5 mg by mouth Daily.        Pharmacy where request should be sent: [facility]/PHARMACY #[id number] [facility] [address] - [phone]  - [phone] FX     Additional details provided by patient: PATIENT ONLY HAS 2 TABLETS LEFT  Does the patient have less than a 3 day supply:  [x] Yes  [] No    Jose Martin Gamble Rep   03/07/22 14:13 EST

## 2022-03-08 RX ORDER — PHENTERMINE AND TOPIRAMATE 7.5; 46 MG/1; MG/1
7.5 CAPSULE, EXTENDED RELEASE ORAL DAILY
Qty: 30 CAPSULE | Refills: 0 | Status: SHIPPED | OUTPATIENT
Start: 2022-03-08 | End: 2022-04-20 | Stop reason: SDUPTHER

## 2022-03-08 NOTE — TELEPHONE ENCOUNTER
RELAYED MESSAGE TO PATIENT, PATIENT DID NOT WANT TO SCHEDULE AN APPOINTMENT BECAUSE SHE WAS TOLD BY LOUANN HAYNES THAT ALL SHE WOULD  HAVE TO DO IS CALL TO REFILL MEDICATION. PLEASE CALLBACK AND ADVISE PATIENT.    CALLBACK NUMBER:[phone]

## 2022-03-08 NOTE — TELEPHONE ENCOUNTER
**HUB OKAY TO READ**      Called pt to inform.  Phone was disconnected.  Pt will need appointment.

## 2022-03-09 ENCOUNTER — TELEPHONE (OUTPATIENT)
Dept: FAMILY MEDICINE CLINIC | Facility: CLINIC | Age: 57
End: 2022-03-09

## 2022-03-09 NOTE — TELEPHONE ENCOUNTER
Caller: Alecia Patel    Relationship to patient: Self    Best call back number: [phone]    Date of positive COVID19 test: TESTED POSITIVE ON 03/03/2022, AND AGAIN TODAY WITH A HOME TEST    COVID19 symptoms: FATIGUE, FEVER, RUNNY NOSE, COUGH, A LITTLE SHORTNESS OF BREATH WITH EXERTION - PATIENT STATED SHE IS USING INHALER AND IT IS MANAGEABLE    Date of initial quarantine: 03/03/2022    Additional information or concerns: PATIENT WOULD LIKE TO KNOW IF SHE IS ABLE TO GET THE MONOCLONAL ANTIBODY INFUSION. PATIENT STATED THAT SHE IS TRYING TO KEEP IT FROM GOING INTO PNEUMONIA.  PLEASE ADVISE.     What is the patients preferred pharmacy:     [facility]/pharmacy #[id number] - [place], [place] - [address] - [phone]  - [phone] FX

## 2022-03-11 NOTE — TELEPHONE ENCOUNTER
She just finished a dose pack that the ortho had given her and today is first day she feels better told her if symptoms worsen more SOA needs to go to ER or [facility] otherwise call us Monday if any further queations

## 2022-03-11 NOTE — TELEPHONE ENCOUNTER
Send her over a Medrol Dosepak No. 1 as directed  If she fits the criteria for the infusion that is okay with me

## 2022-04-02 DIAGNOSIS — F41.9 ANXIETY: ICD-10-CM

## 2022-04-04 NOTE — TELEPHONE ENCOUNTER
Looks like patient has 2 orders, one for Paxil 12.5 mg and another Paxil 25 mg.  Can you clarify which she is on before we refill?

## 2022-04-04 NOTE — TELEPHONE ENCOUNTER
Rx Refill Note  Requested Prescriptions     Pending Prescriptions Disp Refills   • PARoxetine CR (PAXIL-CR) 25 MG 24 hr tablet [Pharmacy Med Name: PAROXETINE ER 25 MG TABLET] 30 tablet 0     Sig: TAKE 1 TABLET BY MOUTH EVERY DAY IN THE MORNING      Last office visit with prescribing clinician: 12/14/2021      Next office visit with prescribing clinician: Visit date not found            Marcelino Bruno MA  04/04/22, 16:49 EDT

## 2022-04-05 RX ORDER — PAROXETINE HYDROCHLORIDE HEMIHYDRATE 25 MG/1
TABLET, FILM COATED, EXTENDED RELEASE ORAL
Qty: 30 TABLET | Refills: 0 | OUTPATIENT
Start: 2022-04-05

## 2022-04-15 DIAGNOSIS — R63.5 WEIGHT GAIN: ICD-10-CM

## 2022-04-15 RX ORDER — LEVOTHYROXINE SODIUM 0.03 MG/1
25 TABLET ORAL DAILY
Qty: 90 TABLET | Refills: 1 | Status: SHIPPED | OUTPATIENT
Start: 2022-04-15 | End: 2022-09-16

## 2022-04-15 RX ORDER — PHENTERMINE AND TOPIRAMATE 7.5; 46 MG/1; MG/1
7.5 CAPSULE, EXTENDED RELEASE ORAL DAILY
Qty: 30 CAPSULE | Refills: 0 | OUTPATIENT
Start: 2022-04-15

## 2022-04-15 NOTE — TELEPHONE ENCOUNTER
Rx Refill Note  Requested Prescriptions     Pending Prescriptions Disp Refills   • Phentermine-Topiramate (Qsymia) 7.5-46 MG capsule sustained-release 24 hr 30 capsule 0     Sig: Take 7.5 mg by mouth Daily.      Last office visit with prescribing clinician: 12/14/2021      Next office visit with prescribing clinician: Visit date not found            Marcelino Bruno MA  04/15/22, 10:28 EDT

## 2022-04-15 NOTE — TELEPHONE ENCOUNTER
Caller: Alecia Patel    Relationship: Self    Best call back number: [phone]    Requested Prescriptions:   Requested Prescriptions     Pending Prescriptions Disp Refills   • Phentermine-Topiramate (Qsymia) 7.5-46 MG capsule sustained-release 24 hr 30 capsule 0     Sig: Take 7.5 mg by mouth Daily.        Pharmacy where request should be sent: [facility]/PHARMACY #[id number] [facility] [address] - [phone]  - [phone] FX     Additional details provided by patient: PATIENT HAS FOUR LEFT.    PATIENT ALSO QUESTIONED IF THE DOSAGE WAS GOING TO BE INCREASED AFTER THE 90 DAY PERIOD.    PATIENT STATED SHE HAS BEEN TAKING THEM FOR 90 DAYS NOW.    Does the patient have less than a 3 day supply:  [] Yes  [x] No    Jose Martin Valladares Rep   04/15/22 10:25 EDT

## 2022-04-15 NOTE — TELEPHONE ENCOUNTER
Rx Refill Note  Requested Prescriptions      No prescriptions requested or ordered in this encounter      Last office visit with prescribing clinician: 12/14/2021      Next office visit with prescribing clinician: Visit date not found            Selam Ramirez MA  04/15/22, 15:43 EDT

## 2022-04-20 DIAGNOSIS — R63.5 WEIGHT GAIN: ICD-10-CM

## 2022-04-20 NOTE — TELEPHONE ENCOUNTER
PATIENT CALLED FOR MEDICATION REFILL OF   Phentermine-Topiramate (Qsymia) 7.5-46 MG capsule sustained-release 24     SHE IS OUT OF MEDICATION. SHE HAS AN APPOINTMENT ON 4/25/22     SHE IS REQUESTING REFILL TO HOLD HER OVER UNTIL HER APPOINTMENT.    [id number] [place] [place] [address] - [phone]  - [phone]   [phone]    CALL BACK NUMBER [phone]    PLEASE CALL WHEN SENT TO PHARMACY

## 2022-04-21 RX ORDER — PHENTERMINE AND TOPIRAMATE 7.5; 46 MG/1; MG/1
7.5 CAPSULE, EXTENDED RELEASE ORAL DAILY
Qty: 30 CAPSULE | Refills: 0 | Status: SHIPPED | OUTPATIENT
Start: 2022-04-21 | End: 2022-07-14

## 2022-04-25 ENCOUNTER — TELEPHONE (OUTPATIENT)
Dept: FAMILY MEDICINE CLINIC | Facility: CLINIC | Age: 57
End: 2022-04-25

## 2022-04-25 NOTE — TELEPHONE ENCOUNTER
I informed patient that since her provider is absent today, that the appointment cancellation  will not be held against her. Advised patient to call office back when she has a moment to reschedule.

## 2022-06-08 ENCOUNTER — TELEPHONE (OUTPATIENT)
Dept: INTERNAL MEDICINE | Facility: CLINIC | Age: 57
End: 2022-06-08

## 2022-06-08 ENCOUNTER — OFFICE VISIT (OUTPATIENT)
Dept: INTERNAL MEDICINE | Facility: CLINIC | Age: 57
End: 2022-06-08

## 2022-06-08 VITALS
HEART RATE: 102 BPM | SYSTOLIC BLOOD PRESSURE: 150 MMHG | TEMPERATURE: 96.9 F | DIASTOLIC BLOOD PRESSURE: 97 MMHG | HEIGHT: 65 IN | OXYGEN SATURATION: 98 % | WEIGHT: 182.4 LBS | BODY MASS INDEX: 30.39 KG/M2

## 2022-06-08 DIAGNOSIS — I10 HYPERTENSION, UNSPECIFIED TYPE: Primary | ICD-10-CM

## 2022-06-08 DIAGNOSIS — E66.09 CLASS 1 OBESITY DUE TO EXCESS CALORIES WITHOUT SERIOUS COMORBIDITY WITH BODY MASS INDEX (BMI) OF 30.0 TO 30.9 IN ADULT: ICD-10-CM

## 2022-06-08 DIAGNOSIS — Z76.89 ENCOUNTER TO ESTABLISH CARE: ICD-10-CM

## 2022-06-08 DIAGNOSIS — Z98.84 GASTRIC BYPASS STATUS FOR OBESITY: ICD-10-CM

## 2022-06-08 DIAGNOSIS — E78.00 HYPERCHOLESTEREMIA: ICD-10-CM

## 2022-06-08 DIAGNOSIS — F41.9 ANXIETY: ICD-10-CM

## 2022-06-08 DIAGNOSIS — Z13.1 SCREENING FOR DIABETES MELLITUS: ICD-10-CM

## 2022-06-08 DIAGNOSIS — I10 HYPERTENSION, UNSPECIFIED TYPE: ICD-10-CM

## 2022-06-08 DIAGNOSIS — E53.8 VITAMIN B12 DEFICIENCY: ICD-10-CM

## 2022-06-08 DIAGNOSIS — E03.9 HYPOTHYROIDISM, UNSPECIFIED TYPE: ICD-10-CM

## 2022-06-08 PROBLEM — B00.9 HERPES SIMPLEX TYPE 2 INFECTION: Status: ACTIVE | Noted: 2018-12-14

## 2022-06-08 PROCEDURE — 99214 OFFICE O/P EST MOD 30 MIN: CPT | Performed by: NURSE PRACTITIONER

## 2022-06-08 RX ORDER — DESVENLAFAXINE SUCCINATE 50 MG/1
50 TABLET, EXTENDED RELEASE ORAL DAILY
COMMUNITY
End: 2022-07-14

## 2022-06-08 RX ORDER — LOSARTAN POTASSIUM 50 MG/1
50 TABLET ORAL DAILY
Qty: 30 TABLET | Refills: 1 | Status: SHIPPED | OUTPATIENT
Start: 2022-06-08 | End: 2022-06-22 | Stop reason: SDUPTHER

## 2022-06-08 RX ORDER — LOSARTAN POTASSIUM 50 MG/1
50 TABLET ORAL DAILY
Qty: 30 TABLET | Refills: 1 | Status: SHIPPED | OUTPATIENT
Start: 2022-06-08 | End: 2022-06-08 | Stop reason: SDUPTHER

## 2022-06-08 RX ORDER — BUSPIRONE HYDROCHLORIDE 7.5 MG/1
TABLET ORAL
COMMUNITY
Start: 2022-06-02 | End: 2022-06-22 | Stop reason: SDUPTHER

## 2022-06-08 NOTE — PROGRESS NOTES
Date of Encounter: 2022  Patient: Alecia Hunter,  1965    Subjective     Chief complaint: Hypertension    HPI     Patient here today establishing care.  Patient would like to discuss about her blood pressure.  Patient states that she was seen at her PCP office in December and was told to follow-up in 6 months for blood pressure.  Patient states that she has headaches often and her blood pressure has been high at home.  Patient has never been treated for hypertension in the past, but states that she does have a family history of heart disease.    Patient has been following with a therapist, Aline Olivo as of recently for her anxiety.  Patient took some genetic testing for medication selection and states that she is in the process of switching from Zoloft to Pristiq.       Patient would also like a refill on her phentermine.  Patient states that she has recently run out this week.  Patient states that she was interested in increasing her dosage for her next prescription.  No other acute concerns today overall patient states she is doing well.      Review of Systems:  Negative for fever, congestion, chest pain upon exertion, shortness of breath, vision changes, vomiting, dysuria, lymphadenopathy, muscle weakness, numbness, mood changes, rashes.  Positive for headaches.    The following portions of the patient's history were reviewed and updated as appropriate: allergies, current medications, past family history, past medical history, past social history, past surgical history and problem list.    Patient Active Problem List   Diagnosis   • Anxiety   • Back pain   • Constipation   • Depression   • Insomnia   • Vitamin B12 deficiency   • Weight gain   • Mild episode of recurrent major depressive disorder (HCC)   • Screening for diabetes mellitus   • Preop examination   • Hypothyroidism   • Bilateral hip pain   • Gastric bypass status for obesity   • Herpes simplex type 2 infection   •  "Hypertension     Past Medical History:   Diagnosis Date   • Walking pneumonia      Past Surgical History:   Procedure Laterality Date   • AUGMENTATION MAMMAPLASTY Bilateral     ssaline   •  SECTION     • HERNIA REPAIR     • OOPHORECTOMY Left    • REDUCTION MAMMAPLASTY Bilateral    • STOMACH SURGERY       Family History   Problem Relation Age of Onset   • COPD Mother    • Cancer Father         colon cancer   • Colon cancer Father        Current Outpatient Medications:   •  Cholecalciferol (VITAMIN D3 PO), Take 2,000 Units by mouth., Disp: , Rfl:   •  cyanocobalamin 1000 MCG/ML injection, Inject 1 mL into the appropriate muscle as directed by prescriber 2 (Two) Times a Week., Disp: 75 mL, Rfl: 1  •  desvenlafaxine (Pristiq) 50 MG 24 hr tablet, Take 50 mg by mouth Daily., Disp: , Rfl:   •  levothyroxine (SYNTHROID, LEVOTHROID) 25 MCG tablet, Take 1 tablet by mouth Daily. , Disp: 90 tablet, Rfl: 1  •  MAGNESIUM PO, Take  by mouth., Disp: , Rfl:   •  meloxicam (MOBIC) 15 MG tablet, Take 1 tablet by mouth Daily., Disp: 90 tablet, Rfl: 3  •  Phentermine-Topiramate (Qsymia) 7.5-46 MG capsule sustained-release 24 hr, Take 7.5 mg by mouth Daily., Disp: 30 capsule, Rfl: 0  •  Syringe/Needle, Disp, 23G X 1\" 3 ML misc, Use to inject vitamin b12 weekly, Disp: 100 each, Rfl: 3  •  valACYclovir (Valtrex) 500 MG tablet, Take 1 tablet by mouth Daily., Disp: 90 tablet, Rfl: 3  •  busPIRone (BUSPAR) 7.5 MG tablet, , Disp: , Rfl:   •  losartan (Cozaar) 50 MG tablet, Take 1 tablet by mouth Daily., Disp: 30 tablet, Rfl: 1  No Known Allergies  Social History     Tobacco Use   • Smoking status: Never Smoker   • Smokeless tobacco: Never Used   Substance Use Topics   • Alcohol use: Yes     Comment: Social drinker          Objective   Physical Exam   Vitals:    22 0859   BP: 150/97   Pulse: 102   Temp: 96.9 °F (36.1 °C)   TempSrc: Temporal   SpO2: 98%   Weight: 82.7 kg (182 lb 6.4 oz)   Height: 165.1 cm (65\") "     Body mass index is 30.35 kg/m².    Constitutional: NAD.  Eyes: EOMI. PERRLA. Normal conjunctiva.  Cardiovascular: RRR. No murmurs. No LE edema b/l. Radial pulses 2+ bilaterally.  Pulmonary: CTA b/l. Good effort.  Integumentary: No rashes or wounds on face or upper extremities.  Psychiatric: Normal affect. Normal thought content.           Assessment & Plan   Assessment and Plan  Pleasant 56-year-old female with hypertension, B12 deficiency, hypothyroidism, gastric bypass status for obesity, constipation, anxiety and others here today to establish care.  The following was discussed:    Diagnoses and all orders for this visit:    1. Hypertension, unspecified type (Primary)  Assessment & Plan:  Discussed about diagnoses with patient in detail.  Included educational information to patient's AVS at discharge today.  Discussed about red flag symptoms of blood pressure being too high.  Discussed about behavioral modifications with patient.  Discussed about the importance of blood pressure control.  Discussed about medications and side effects.      Orders:  -     losartan (Cozaar) 50 MG tablet; Take 1 tablet by mouth Daily.  Dispense: 30 tablet; Refill: 1    2. Anxiety  Assessment & Plan:  Patient currently adjusting medications.  Discussed about importance of following the directions from her prescriber.  Patient to continue following with therapist on a regular basis.  We will continue to monitor.      3. Hypothyroidism, unspecified type  -     Thyroid Panel With TSH    4. Screening for diabetes mellitus  -     Hemoglobin A1c    5. Class 1 obesity due to excess calories without serious comorbidity with body mass index (BMI) of 30.0 to 30.9 in adult  -     Hemoglobin A1c  -     Comprehensive Metabolic Panel  -     Lipid Panel    6. Hypercholesteremia  -     CBC & Differential    7. Vitamin B12 deficiency  -     Iron Profile    8. Gastric bypass status for obesity  Assessment & Plan:  Patient needs refill on  phentermine.  Discussed about holding on phentermine until we have her blood pressure controlled.  Patient agreeable.  At follow-up visit will discuss about restarting phentermine and proper dosage.      9. Encounter to establish care    Welcomed patient to practice. Discussed office policies. Reviewed patient reported medical history at bedside.  Patient verbalized understanding of and agreed to plan of care.      Return in about 2 weeks (around 6/22/2022) for Annual physical, BP check.     Jackie Butler DNP, FNP-C  [facility]  O: [phone]      Please note that portions of this note were completed with a voice recognition program. Please call if questions.

## 2022-06-08 NOTE — TELEPHONE ENCOUNTER
PATIENT IS CALLING IN SHE IS ASKING IF KATHY CAN RESEND HER LOSARTAN PRESCRIPTION TO HER PHARMACY IN [place], THE ONE THAT IT WAS SENT TO HAS CARS WRAPPED AROUND THE BUILDING AND SHE DOES NOT HAVE TIME TO SIT IN THAT LINE.      CONFIRMED PHARMACY  [facility]/pharmacy #[id number] - [place], [place] - [address] - [phone]  - [phone] FX

## 2022-06-08 NOTE — PATIENT INSTRUCTIONS
Hypertension, Adult  Hypertension is another name for high blood pressure. High blood pressure forces your heart to work harder to pump blood. This can cause problems over time.  There are two numbers in a blood pressure reading. There is a top number (systolic) over a bottom number (diastolic). It is best to have a blood pressure that is below 120/80. Healthy choices can help lower your blood pressure, or you may need medicine to help lower it.  What are the causes?  The cause of this condition is not known. Some conditions may be related to high blood pressure.  What increases the risk?  Smoking.  Having type 2 diabetes mellitus, high cholesterol, or both.  Not getting enough exercise or physical activity.  Being overweight.  Having too much fat, sugar, calories, or salt (sodium) in your diet.  Drinking too much alcohol.  Having long-term (chronic) kidney disease.  Having a family history of high blood pressure.  Age. Risk increases with age.  Race. You may be at higher risk if you are .  Gender. Men are at higher risk than women before age 45. After age 65, women are at higher risk than men.  Having obstructive sleep apnea.  Stress.  What are the signs or symptoms?  High blood pressure may not cause symptoms. Very high blood pressure (hypertensive crisis) may cause:  Headache.  Feelings of worry or nervousness (anxiety).  Shortness of breath.  Nosebleed.  A feeling of being sick to your stomach (nausea).  Throwing up (vomiting).  Changes in how you see.  Very bad chest pain.  Seizures.  How is this treated?  This condition is treated by making healthy lifestyle changes, such as:  Eating healthy foods.  Exercising more.  Drinking less alcohol.  Your health care provider may prescribe medicine if lifestyle changes are not enough to get your blood pressure under control, and if:  Your top number is above 130.  Your bottom number is above 80.  Your personal target blood pressure may vary.  Follow  these instructions at home:  Eating and drinking    If told, follow the DASH eating plan. To follow this plan:  Fill one half of your plate at each meal with fruits and vegetables.  Fill one fourth of your plate at each meal with whole grains. Whole grains include whole-wheat pasta, brown rice, and whole-grain bread.  Eat or drink low-fat dairy products, such as skim milk or low-fat yogurt.  Fill one fourth of your plate at each meal with low-fat (lean) proteins. Low-fat proteins include fish, chicken without skin, eggs, beans, and tofu.  Avoid fatty meat, cured and processed meat, or chicken with skin.  Avoid pre-made or processed food.  Eat less than 1,500 mg of salt each day.  Do not drink alcohol if:  Your doctor tells you not to drink.  You are pregnant, may be pregnant, or are planning to become pregnant.  If you drink alcohol:  Limit how much you use to:  0-1 drink a day for women.  0-2 drinks a day for men.  Be aware of how much alcohol is in your drink. In the [place] one drink equals one 12 oz bottle of beer (355 mL), one 5 oz glass of wine (148 mL), or one 1½ oz glass of hard liquor (44 mL).    Lifestyle    Work with your doctor to stay at a healthy weight or to lose weight. Ask your doctor what the best weight is for you.  Get at least 30 minutes of exercise most days of the week. This may include walking, swimming, or biking.  Get at least 30 minutes of exercise that strengthens your muscles (resistance exercise) at least 3 days a week. This may include lifting weights or doing Pilates.  Do not use any products that contain nicotine or tobacco, such as cigarettes, e-cigarettes, and chewing tobacco. If you need help quitting, ask your doctor.  Check your blood pressure at home as told by your doctor.  Keep all follow-up visits as told by your doctor. This is important.    Medicines  Take over-the-counter and prescription medicines only as told by your doctor. Follow directions carefully.  Do not skip doses  of blood pressure medicine. The medicine does not work as well if you skip doses. Skipping doses also puts you at risk for problems.  Ask your doctor about side effects or reactions to medicines that you should watch for.  Contact a doctor if you:  Think you are having a reaction to the medicine you are taking.  Have headaches that keep coming back (recurring).  Feel dizzy.  Have swelling in your ankles.  Have trouble with your vision.  Get help right away if you:  Get a very bad headache.  Start to feel mixed up (confused).  Feel weak or numb.  Feel faint.  Have very bad pain in your:  Chest.  Belly (abdomen).  Throw up more than once.  Have trouble breathing.  Summary  Hypertension is another name for high blood pressure.  High blood pressure forces your heart to work harder to pump blood.  For most people, a normal blood pressure is less than 120/80.  Making healthy choices can help lower blood pressure. If your blood pressure does not get lower with healthy choices, you may need to take medicine.  This information is not intended to replace advice given to you by your health care provider. Make sure you discuss any questions you have with your health care provider.  Document Revised: 08/28/2019 Document Reviewed: 08/28/2019  [employer] [facility] Patient Education © 2021 [employer]

## 2022-06-08 NOTE — ASSESSMENT & PLAN NOTE
Patient currently adjusting medications.  Discussed about importance of following the directions from her prescriber.  Patient to continue following with therapist on a regular basis.  We will continue to monitor.

## 2022-06-08 NOTE — ASSESSMENT & PLAN NOTE
Discussed about diagnoses with patient in detail.  Included educational information to patient's AVS at discharge today.  Discussed about red flag symptoms of blood pressure being too high.  Discussed about behavioral modifications with patient.  Discussed about the importance of blood pressure control.  Discussed about medications and side effects.

## 2022-06-08 NOTE — ASSESSMENT & PLAN NOTE
Patient needs refill on phentermine.  Discussed about holding on phentermine until we have her blood pressure controlled.  Patient agreeable.  At follow-up visit will discuss about restarting phentermine and proper dosage.

## 2022-06-09 LAB
ALBUMIN SERPL-MCNC: 4.9 G/DL (ref 3.8–4.9)
ALBUMIN/GLOB SERPL: 1.6 {RATIO} (ref 1.2–2.2)
ALP SERPL-CCNC: 119 IU/L (ref 44–121)
ALT SERPL-CCNC: 47 IU/L (ref 0–32)
AST SERPL-CCNC: 60 IU/L (ref 0–40)
BASOPHILS # BLD AUTO: 0.1 X10E3/UL (ref 0–0.2)
BASOPHILS NFR BLD AUTO: 1 %
BILIRUB SERPL-MCNC: 0.5 MG/DL (ref 0–1.2)
BUN SERPL-MCNC: 13 MG/DL (ref 6–24)
BUN/CREAT SERPL: 13 (ref 9–23)
CALCIUM SERPL-MCNC: 10.2 MG/DL (ref 8.7–10.2)
CHLORIDE SERPL-SCNC: 100 MMOL/L (ref 96–106)
CHOLEST SERPL-MCNC: 270 MG/DL (ref 100–199)
CO2 SERPL-SCNC: 23 MMOL/L (ref 20–29)
CREAT SERPL-MCNC: 1.04 MG/DL (ref 0.57–1)
EGFRCR SERPLBLD CKD-EPI 2021: 63 ML/MIN/1.73
EOSINOPHIL # BLD AUTO: 0.1 X10E3/UL (ref 0–0.4)
EOSINOPHIL NFR BLD AUTO: 2 %
ERYTHROCYTE [DISTWIDTH] IN BLOOD BY AUTOMATED COUNT: 16.1 % (ref 11.7–15.4)
FT4I SERPL CALC-MCNC: 2.3 (ref 1.2–4.9)
GLOBULIN SER CALC-MCNC: 3 G/DL (ref 1.5–4.5)
GLUCOSE SERPL-MCNC: 86 MG/DL (ref 65–99)
HBA1C MFR BLD: 5.4 % (ref 4.8–5.6)
HCT VFR BLD AUTO: 39.5 % (ref 34–46.6)
HDLC SERPL-MCNC: 129 MG/DL
HGB BLD-MCNC: 11.9 G/DL (ref 11.1–15.9)
IMM GRANULOCYTES # BLD AUTO: 0 X10E3/UL (ref 0–0.1)
IMM GRANULOCYTES NFR BLD AUTO: 0 %
IRON SATN MFR SERPL: 12 % (ref 15–55)
IRON SERPL-MCNC: 59 UG/DL (ref 27–159)
LDLC SERPL CALC-MCNC: 127 MG/DL (ref 0–99)
LYMPHOCYTES # BLD AUTO: 1 X10E3/UL (ref 0.7–3.1)
LYMPHOCYTES NFR BLD AUTO: 17 %
MCH RBC QN AUTO: 24.1 PG (ref 26.6–33)
MCHC RBC AUTO-ENTMCNC: 30.1 G/DL (ref 31.5–35.7)
MCV RBC AUTO: 80 FL (ref 79–97)
MONOCYTES # BLD AUTO: 0.6 X10E3/UL (ref 0.1–0.9)
MONOCYTES NFR BLD AUTO: 10 %
NEUTROPHILS # BLD AUTO: 4.1 X10E3/UL (ref 1.4–7)
NEUTROPHILS NFR BLD AUTO: 70 %
PLATELET # BLD AUTO: 414 X10E3/UL (ref 150–450)
POTASSIUM SERPL-SCNC: 4.3 MMOL/L (ref 3.5–5.2)
PROT SERPL-MCNC: 7.9 G/DL (ref 6–8.5)
RBC # BLD AUTO: 4.94 X10E6/UL (ref 3.77–5.28)
SODIUM SERPL-SCNC: 141 MMOL/L (ref 134–144)
T3RU NFR SERPL: 29 % (ref 24–39)
T4 SERPL-MCNC: 7.8 UG/DL (ref 4.5–12)
TIBC SERPL-MCNC: 509 UG/DL (ref 250–450)
TRIGL SERPL-MCNC: 87 MG/DL (ref 0–149)
TSH SERPL DL<=0.005 MIU/L-ACNC: 3.33 UIU/ML (ref 0.45–4.5)
UIBC SERPL-MCNC: 450 UG/DL (ref 131–425)
VLDLC SERPL CALC-MCNC: 14 MG/DL (ref 5–40)
WBC # BLD AUTO: 6 X10E3/UL (ref 3.4–10.8)

## 2022-06-13 ENCOUNTER — PATIENT ROUNDING (BHMG ONLY) (OUTPATIENT)
Dept: INTERNAL MEDICINE | Facility: CLINIC | Age: 57
End: 2022-06-13

## 2022-06-22 ENCOUNTER — OFFICE VISIT (OUTPATIENT)
Dept: INTERNAL MEDICINE | Facility: CLINIC | Age: 57
End: 2022-06-22

## 2022-06-22 VITALS
WEIGHT: 184 LBS | HEIGHT: 65 IN | SYSTOLIC BLOOD PRESSURE: 132 MMHG | DIASTOLIC BLOOD PRESSURE: 90 MMHG | TEMPERATURE: 98.4 F | HEART RATE: 80 BPM | OXYGEN SATURATION: 98 % | BODY MASS INDEX: 30.66 KG/M2

## 2022-06-22 DIAGNOSIS — I10 HYPERTENSION, UNSPECIFIED TYPE: ICD-10-CM

## 2022-06-22 DIAGNOSIS — F41.9 ANXIETY: ICD-10-CM

## 2022-06-22 DIAGNOSIS — Z09 HOSPITAL DISCHARGE FOLLOW-UP: Primary | ICD-10-CM

## 2022-06-22 DIAGNOSIS — F33.0 MILD EPISODE OF RECURRENT MAJOR DEPRESSIVE DISORDER: ICD-10-CM

## 2022-06-22 PROCEDURE — 99214 OFFICE O/P EST MOD 30 MIN: CPT | Performed by: NURSE PRACTITIONER

## 2022-06-22 RX ORDER — BUSPIRONE HYDROCHLORIDE 5 MG/1
10 TABLET ORAL 3 TIMES DAILY
Qty: 180 TABLET | Refills: 3 | Status: SHIPPED | OUTPATIENT
Start: 2022-06-22

## 2022-06-22 RX ORDER — METOPROLOL SUCCINATE 50 MG/1
TABLET, EXTENDED RELEASE ORAL
COMMUNITY
Start: 2022-06-15 | End: 2022-07-07

## 2022-06-22 RX ORDER — LOSARTAN POTASSIUM 25 MG/1
25 TABLET ORAL DAILY
COMMUNITY
Start: 2022-06-15 | End: 2022-07-07 | Stop reason: SDUPTHER

## 2022-06-22 RX ORDER — BUSPIRONE HYDROCHLORIDE 7.5 MG/1
7.5 TABLET ORAL
COMMUNITY
Start: 2022-06-13 | End: 2022-06-22 | Stop reason: DRUGHIGH

## 2022-06-22 RX ORDER — ATORVASTATIN CALCIUM 20 MG/1
20 TABLET, FILM COATED ORAL
COMMUNITY
Start: 2022-06-15 | End: 2022-09-16

## 2022-06-22 NOTE — PATIENT INSTRUCTIONS
Hypertension, Adult  Hypertension is another name for high blood pressure. High blood pressure forces your heart to work harder to pump blood. This can cause problems over time.  There are two numbers in a blood pressure reading. There is a top number (systolic) over a bottom number (diastolic). It is best to have a blood pressure that is below 120/80. Healthy choices can help lower your blood pressure, or you may need medicine to help lower it.  What are the causes?  The cause of this condition is not known. Some conditions may be related to high blood pressure.  What increases the risk?  Smoking.  Having type 2 diabetes mellitus, high cholesterol, or both.  Not getting enough exercise or physical activity.  Being overweight.  Having too much fat, sugar, calories, or salt (sodium) in your diet.  Drinking too much alcohol.  Having long-term (chronic) kidney disease.  Having a family history of high blood pressure.  Age. Risk increases with age.  Race. You may be at higher risk if you are .  Gender. Men are at higher risk than women before age 45. After age 65, women are at higher risk than men.  Having obstructive sleep apnea.  Stress.  What are the signs or symptoms?  High blood pressure may not cause symptoms. Very high blood pressure (hypertensive crisis) may cause:  Headache.  Feelings of worry or nervousness (anxiety).  Shortness of breath.  Nosebleed.  A feeling of being sick to your stomach (nausea).  Throwing up (vomiting).  Changes in how you see.  Very bad chest pain.  Seizures.  How is this treated?  This condition is treated by making healthy lifestyle changes, such as:  Eating healthy foods.  Exercising more.  Drinking less alcohol.  Your health care provider may prescribe medicine if lifestyle changes are not enough to get your blood pressure under control, and if:  Your top number is above 130.  Your bottom number is above 80.  Your personal target blood pressure may vary.  Follow  these instructions at home:  Eating and drinking    If told, follow the DASH eating plan. To follow this plan:  Fill one half of your plate at each meal with fruits and vegetables.  Fill one fourth of your plate at each meal with whole grains. Whole grains include whole-wheat pasta, brown rice, and whole-grain bread.  Eat or drink low-fat dairy products, such as skim milk or low-fat yogurt.  Fill one fourth of your plate at each meal with low-fat (lean) proteins. Low-fat proteins include fish, chicken without skin, eggs, beans, and tofu.  Avoid fatty meat, cured and processed meat, or chicken with skin.  Avoid pre-made or processed food.  Eat less than 1,500 mg of salt each day.  Do not drink alcohol if:  Your doctor tells you not to drink.  You are pregnant, may be pregnant, or are planning to become pregnant.  If you drink alcohol:  Limit how much you use to:  0-1 drink a day for women.  0-2 drinks a day for men.  Be aware of how much alcohol is in your drink. In the [place] one drink equals one 12 oz bottle of beer (355 mL), one 5 oz glass of wine (148 mL), or one 1½ oz glass of hard liquor (44 mL).    Lifestyle    Work with your doctor to stay at a healthy weight or to lose weight. Ask your doctor what the best weight is for you.  Get at least 30 minutes of exercise most days of the week. This may include walking, swimming, or biking.  Get at least 30 minutes of exercise that strengthens your muscles (resistance exercise) at least 3 days a week. This may include lifting weights or doing Pilates.  Do not use any products that contain nicotine or tobacco, such as cigarettes, e-cigarettes, and chewing tobacco. If you need help quitting, ask your doctor.  Check your blood pressure at home as told by your doctor.  Keep all follow-up visits as told by your doctor. This is important.    Medicines  Take over-the-counter and prescription medicines only as told by your doctor. Follow directions carefully.  Do not skip doses  of blood pressure medicine. The medicine does not work as well if you skip doses. Skipping doses also puts you at risk for problems.  Ask your doctor about side effects or reactions to medicines that you should watch for.  Contact a doctor if you:  Think you are having a reaction to the medicine you are taking.  Have headaches that keep coming back (recurring).  Feel dizzy.  Have swelling in your ankles.  Have trouble with your vision.  Get help right away if you:  Get a very bad headache.  Start to feel mixed up (confused).  Feel weak or numb.  Feel faint.  Have very bad pain in your:  Chest.  Belly (abdomen).  Throw up more than once.  Have trouble breathing.  Summary  Hypertension is another name for high blood pressure.  High blood pressure forces your heart to work harder to pump blood.  For most people, a normal blood pressure is less than 120/80.  Making healthy choices can help lower blood pressure. If your blood pressure does not get lower with healthy choices, you may need to take medicine.  This information is not intended to replace advice given to you by your health care provider. Make sure you discuss any questions you have with your health care provider.  Document Revised: 08/28/2019 Document Reviewed: 08/28/2019  [employer] Patient Education © 2021 [employer]

## 2022-06-22 NOTE — ASSESSMENT & PLAN NOTE
Patient to continue following with therapist as scheduled.  Reviewed medications with patient.  We will continue to follow closely.

## 2022-06-22 NOTE — PROGRESS NOTES
Date of Encounter: 2022  Patient: Alecia Hunter,  1965    Subjective     Chief complaint: Hospital follow-up    HPI   Patient here today following up from her recent hospitalization.  After patient's last visit in this office she was started on medication for her blood pressure.  Patient states that on 612 she was feeling bad and she checked her blood pressure and it was high.  Patient was also complaining of chest pain at that time and decided to go to the ER.  Patient was admitted to [facility] on 61 and discharged on 61.    Patient states that she was seen in the Cath Lab and was told that there were not any blockages.  Patient was discharged with changes to her medications.  Patient would like to review those medications at her visit today.    Patient also has some questions regarding hypertension.  Patient states that she does have a family history of high blood pressure and heart disease.  Patient states that since the time of her discharge she has been feeling fairly well.  Has been taking record of her blood pressure at home.      Patient has plans to follow-up with her therapist later this week.  Patient states that she has not started her Pristiq.  Patient is not sure that she wants to start it, will discuss this with her therapist.      Review of Systems:  Negative for fever, congestion, chest pain upon exertion, shortness of breath, vision changes, vomiting, dysuria, lymphadenopathy, muscle weakness, numbness, mood changes, rashes.    The following portions of the patient's history were reviewed and updated as appropriate: allergies, current medications, past family history, past medical history, past social history, past surgical history and problem list.    Patient Active Problem List   Diagnosis   • Anxiety   • Back pain   • Constipation   • Depression   • Insomnia   • Vitamin B12 deficiency   • Weight gain   • Mild episode of recurrent major depressive disorder (HCC)    • Screening for diabetes mellitus   • Preop examination   • Hypothyroidism   • Bilateral hip pain   • Gastric bypass status for obesity   • Herpes simplex type 2 infection   • Hypertension     Past Medical History:   Diagnosis Date   • Walking pneumonia      Past Surgical History:   Procedure Laterality Date   • AUGMENTATION MAMMAPLASTY Bilateral     ssaline   •  SECTION     • HERNIA REPAIR     • OOPHORECTOMY Left    • REDUCTION MAMMAPLASTY Bilateral    • STOMACH SURGERY       Family History   Problem Relation Age of Onset   • COPD Mother    • Cancer Father         colon cancer   • Colon cancer Father        Current Outpatient Medications:   •  atorvastatin (LIPITOR) 20 MG tablet, 20 mg., Disp: , Rfl:   •  Cholecalciferol (VITAMIN D3 PO), Take 2,000 Units by mouth., Disp: , Rfl:   •  cyanocobalamin 1000 MCG/ML injection, Inject 1 mL into the appropriate muscle as directed by prescriber 2 (Two) Times a Week., Disp: 75 mL, Rfl: 1  •  levothyroxine (SYNTHROID, LEVOTHROID) 25 MCG tablet, Take 1 tablet by mouth Daily. , Disp: 90 tablet, Rfl: 1  •  losartan (COZAAR) 25 MG tablet, Take 25 mg by mouth Daily., Disp: , Rfl:   •  MAGNESIUM PO, Take  by mouth., Disp: , Rfl:   •  meloxicam (MOBIC) 15 MG tablet, Take 1 tablet by mouth Daily. (Patient taking differently: Take 15 mg by mouth Daily. Restart 22.), Disp: 90 tablet, Rfl: 3  •  metoprolol succinate XL (TOPROL-XL) 50 MG 24 hr tablet, Take 50 mg by mouth Daily., Disp: , Rfl:   •  valACYclovir (Valtrex) 500 MG tablet, Take 1 tablet by mouth Daily. (Patient taking differently: Take 500 mg by mouth Daily. PRN), Disp: 90 tablet, Rfl: 3  •  busPIRone (BUSPAR) 5 MG tablet, Take 2 tablets by mouth 3 (Three) Times a Day., Disp: 180 tablet, Rfl: 3  •  desvenlafaxine (PRISTIQ) 50 MG 24 hr tablet, Take 50 mg by mouth Daily., Disp: , Rfl:   •  Phentermine-Topiramate (Qsymia) 7.5-46 MG capsule sustained-release 24 hr, Take 7.5 mg by mouth Daily., Disp: 30  "capsule, Rfl: 0  •  Syringe/Needle, Disp, 23G X 1\" 3 ML misc, Use to inject vitamin b12 weekly, Disp: 100 each, Rfl: 3  No Known Allergies  Social History     Tobacco Use   • Smoking status: Never Smoker   • Smokeless tobacco: Never Used   Substance Use Topics   • Alcohol use: Yes     Comment: Social drinker          Objective   Physical Exam   Vitals:    06/22/22 0851   BP: 132/90   Pulse: 80   Temp: 98.4 °F (36.9 °C)   TempSrc: Temporal   SpO2: 98%   Weight: 83.5 kg (184 lb)   Height: 165.1 cm (65\")     Body mass index is 30.62 kg/m².    Constitutional: NAD.  Cardiovascular: RRR. No murmurs. No LE edema b/l. Radial pulses 2+ bilaterally.  Pulmonary: CTA b/l. Good effort.  Integumentary: No rashes or wounds on face or upper extremities.  Psychiatric: Normal affect. Normal thought content.           Assessment & Plan   Assessment and Plan  Pleasant 56-year-old female with weight gain, hypothyroidism, constipation, herpes simplex type II, depression, anxiety, hypertension and others seen here today for hospital follow-up.  The following was discussed:    Diagnoses and all orders for this visit:    1. Hospital discharge follow-up (Primary)   Reviewed hospital notes with patient at bedside.  Medications were reconciled with patient at bedside.    2. Anxiety  -     busPIRone (BUSPAR) 5 MG tablet; Take 2 tablets by mouth 3 (Three) Times a Day.  Dispense: 180 tablet; Refill: 3  Patient to continue following with therapist as scheduled.    3. Hypertension, unspecified type  -     Ambulatory Referral to Cardiology  Medications reviewed and discussed with patient at bedside.  We will hold on making any changes to medications at this time.  Discussed about possible increase of beta-blocker.  We will leave any adjustments up to the cardiologist at her upcoming visit.  Had a long discussion with patient about hypertension and treatment options.  Answered some questions from the patient.  Recommended patient start exercise such " as light walks.  Also discussed about following a low-salt diet.  Discussed with patient about red flag symptoms of hypertension and when to return to the ER.    4. Mild episode of recurrent major depressive disorder (HCC)  Assessment & Plan:  Patient to continue following with therapist as scheduled.  Reviewed medications with patient.  We will continue to follow closely.         Patient verbalized understanding of and agreed to plan of care.    Return in about 3 months (around 9/22/2022) for Annual physical, Fasting Labs.     Jackie Butler DNP, FNP-C  [facility]  O: [phone]      Please note that portions of this note were completed with a voice recognition program. Please call if questions.

## 2022-06-29 ENCOUNTER — OFFICE VISIT (OUTPATIENT)
Dept: CARDIOLOGY | Facility: CLINIC | Age: 57
End: 2022-06-29

## 2022-06-29 ENCOUNTER — LAB (OUTPATIENT)
Dept: LAB | Facility: HOSPITAL | Age: 57
End: 2022-06-29

## 2022-06-29 VITALS
DIASTOLIC BLOOD PRESSURE: 80 MMHG | BODY MASS INDEX: 30.02 KG/M2 | WEIGHT: 186.8 LBS | SYSTOLIC BLOOD PRESSURE: 114 MMHG | HEIGHT: 66 IN | HEART RATE: 69 BPM

## 2022-06-29 DIAGNOSIS — I10 PRIMARY HYPERTENSION: ICD-10-CM

## 2022-06-29 DIAGNOSIS — I10 PRIMARY HYPERTENSION: Primary | ICD-10-CM

## 2022-06-29 PROCEDURE — 36415 COLL VENOUS BLD VENIPUNCTURE: CPT

## 2022-06-29 PROCEDURE — 99204 OFFICE O/P NEW MOD 45 MIN: CPT | Performed by: INTERNAL MEDICINE

## 2022-06-29 PROCEDURE — 93000 ELECTROCARDIOGRAM COMPLETE: CPT | Performed by: INTERNAL MEDICINE

## 2022-06-29 PROCEDURE — 83835 ASSAY OF METANEPHRINES: CPT

## 2022-06-29 NOTE — PROGRESS NOTES
[facility]      Patient Name: Alecia Hunter  :1965  Age: 56 y.o.  Encounter Provider:  Delvin Roman Jr, MD      Chief Complaint:   Chief Complaint   Patient presents with   • Hypertension   • Rapid Heart Rate         HPI  Alecia Hunter is a 56 y.o. female with past medical history of anxiety and hypertension who presents for initial evaluation.  Patient was noticing worsening feelings of anxiety over the last 6 months.  In May she had a couple of episodes of severe palpitations and dizziness.  She did not seek medical therapy at the time.  In  she had some severe episodes and came to [facility] ER.  There she was found to have severely elevated blood pressure and heart rate and was admitted for cardiac work-up.  Supposedly a positive stress test resulted in cardiac catheterization for which there was no need for intervention.  Records are not available at time of interview.  Since leaving the hospital her blood pressure is somewhat better controlled but still quite labile as is her heart rate.  She denies chest pain or shortness of air.  She works as a  and does a lot of walking during the day.  She has anxiety with being addressed with buspirone.  She has difficulty sleeping wakes up several times per night.  She does not think that she snores but her  wears a CPAP and has never mentioned anything to her.  She is a lifelong non-smoker drinks socially and denies illicit drug use.  Father had heart attack at age 62 but lived into his 80s and  of colon cancer.      The following portions of the patient's history were reviewed and updated as appropriate: allergies, current medications, past family history, past medical history, past social history, past surgical history and problem list.      Review of Systems   Constitutional: Negative for chills and fever.   HENT: Negative for hoarse voice and sore throat.    Eyes: Negative  "for double vision and photophobia.   Cardiovascular: Positive for palpitations. Negative for chest pain, leg swelling, near-syncope, orthopnea, paroxysmal nocturnal dyspnea and syncope.   Respiratory: Negative for cough and wheezing.    Skin: Negative for poor wound healing and rash.   Musculoskeletal: Negative for arthritis and joint swelling.   Gastrointestinal: Negative for bloating, abdominal pain, hematemesis and hematochezia.   Neurological: Negative for dizziness and focal weakness.   Psychiatric/Behavioral: Negative for depression and suicidal ideas. The patient is nervous/anxious.        OBJECTIVE:   Vital Signs  Vitals:    06/29/22 1402   BP: 114/80   Pulse: 69     Estimated body mass index is 30.15 kg/m² as calculated from the following:    Height as of this encounter: 167.6 cm (66\").    Weight as of this encounter: 84.7 kg (186 lb 12.8 oz).    Vitals reviewed.   Constitutional:       Appearance: Healthy appearance. Not in distress.   Neck:      Vascular: No JVR. JVD normal.   Pulmonary:      Effort: Pulmonary effort is normal.      Breath sounds: Normal breath sounds. No wheezing. No rhonchi. No rales.   Chest:      Chest wall: Not tender to palpatation.   Cardiovascular:      PMI at left midclavicular line. Normal rate. Regular rhythm. Normal S1. Normal S2.      Murmurs: There is no murmur.      No gallop. No click. No rub.   Pulses:     Intact distal pulses.   Edema:     Peripheral edema absent.   Abdominal:      General: Bowel sounds are normal.      Palpations: Abdomen is soft.      Tenderness: There is no abdominal tenderness.   Musculoskeletal: Normal range of motion.         General: No tenderness. Skin:     General: Skin is warm and dry.   Neurological:      General: No focal deficit present.      Mental Status: Alert and oriented to person, place and time.           ECG 12 Lead    Date/Time: 6/29/2022 3:10 PM  Performed by: Delvin Roman Jr., MD  Authorized by: Delvin Roman Jr., MD "   Comparison: not compared with previous ECG   Previous ECG: no previous ECG available  Rhythm: sinus rhythm  Other findings: left atrial abnormality    Clinical impression: non-specific ECG                  ASSESSMENT:     Uncontrolled hypertension  Paroxysmal tachycardia  Anxiety  Dyslipidemia    PLAN OF CARE:     1. Uncontrolled hypertension -with concomitant tachycardia is suspicious for adrenal tumor.  Check free metanephrines.  If blood pressure proves difficult to control we will consider renal artery Doppler.  Twice daily blood pressure log to be sent in after 1 week.  Thyroid labs are normal.  We will review records from [facility] to see what the scope or cardiac testing entailed.  2. Paroxysmal tachycardia -as above.  We will check a blood pressure log and see if her heart rate is getting better.  If not check Holter monitor.  3. Anxiety -patient continues to work with psychiatry about better control.  Recommend increasing exercise levels.  Given her erratic sleep habits we will have her seen in sleep clinic.  4. Insomnia  5. Dyslipidemia    Return to clinic 3 months             Discharge Medications          Accurate as of June 29, 2022  2:04 PM. If you have any questions, ask your nurse or doctor.            Changes to Medications      Instructions Start Date   meloxicam 15 MG tablet  Commonly known as: MOBIC  What changed: additional instructions   15 mg, Oral, Daily      valACYclovir 500 MG tablet  Commonly known as: Valtrex  What changed: additional instructions   500 mg, Oral, Daily         Continue These Medications      Instructions Start Date   atorvastatin 20 MG tablet  Commonly known as: LIPITOR   20 mg      busPIRone 5 MG tablet  Commonly known as: BUSPAR   10 mg, Oral, 3 Times Daily      cyanocobalamin 1000 MCG/ML injection   1,000 mcg, Intramuscular, 2 Times Weekly      desvenlafaxine 50 MG 24 hr tablet  Commonly known as: PRISTIQ   50 mg, Oral, Daily      levothyroxine 25 MCG  "tablet  Commonly known as: SYNTHROID, LEVOTHROID   25 mcg, Oral, Daily, 200001      losartan 25 MG tablet  Commonly known as: COZAAR   25 mg, Oral, Daily      MAGNESIUM PO   Oral      metoprolol succinate XL 50 MG 24 hr tablet  Commonly known as: TOPROL-XL   50 mg, Oral, Daily      NON FORMULARY   2 Times Daily, Bio complete      Qsymia 7.5-46 MG capsule sustained-release 24 hr  Generic drug: Phentermine-Topiramate   7.5 mg, Oral, Daily      Syringe/Needle (Disp) 23G X 1\" 3 ML misc   Use to inject vitamin b12 weekly      VITAMIN D3 PO   2,000 Units, Oral             Thank you for allowing me to participate in the care of your patient,      Sincerely,   Delvin Roman MD  [facility]  06/29/22  14:04 EDT    "

## 2022-07-06 ENCOUNTER — TELEPHONE (OUTPATIENT)
Dept: CARDIOLOGY | Facility: CLINIC | Age: 57
End: 2022-07-06

## 2022-07-06 DIAGNOSIS — R00.2 PALPITATIONS: Primary | ICD-10-CM

## 2022-07-06 DIAGNOSIS — R00.0 TACHYCARDIA: ICD-10-CM

## 2022-07-06 NOTE — TELEPHONE ENCOUNTER
"Alecia Hunter called to report elevated heart rate, fatigue, and shortness of breath with exertion.  Patient reports her HR was in the 140's this morning when she woke up.  Patient stated she got up and sat at her kitchen table: /78, .  Patient reports that she has been feeling shaky with an elevated HR the past few days upon waking up.  Patient reports fatigue, stating it feels like she just \"ran a marathon\" and one episode of cold chills last night.    Patient provided the following readings:        Cardiac Meds:  Losartan 25mg, daily  Metoprolol XL 50mg, daily    Patient reports taking 2.5 tablets of buspirone (10mg tablet) yesterday due to her elevated HR    Current HR: 126    Patient is questioning starting Pristiq in addition to her PRN buspirone to help with HR.  Patient stated she is concerned about starting a new medication when her HR and BP are not controlled.  Patient stated she doesn't think her elevated HR is related to anxiety and reports being frightened when HR is high.    LOV: 6/29  Next Visit: 9/30   Sleep Med appointment: 9/9    Please let me know how you would like to proceed.    Thank you,  Rosamaria Roa, RN  Triage Nurse DONNAG  "

## 2022-07-06 NOTE — TELEPHONE ENCOUNTER
Alecia Hunter reports she took her buspar already today and would like to come in for EKG tomorrow morning instead.  Patient scheduled 7/7 at 1030am.    Can you please place the orders for echo and holter so patient may schedule this testing after her EKG tomorrow?    Thank you,  Rosamaria Roa RN  Triage Nurse BARBARA

## 2022-07-06 NOTE — TELEPHONE ENCOUNTER
Can she come in for an EKG today?       I also think she needs an echo and Holter scheduled which she could get scheduled for after EKG check.

## 2022-07-07 ENCOUNTER — CLINICAL SUPPORT (OUTPATIENT)
Dept: CARDIOLOGY | Facility: CLINIC | Age: 57
End: 2022-07-07

## 2022-07-07 DIAGNOSIS — R00.2 PALPITATIONS: ICD-10-CM

## 2022-07-07 DIAGNOSIS — R00.0 TACHYCARDIA: Primary | ICD-10-CM

## 2022-07-07 LAB
METANEPH FREE SERPL-MCNC: <10 PG/ML (ref 0–88)
NORMETANEPHRINE SERPL-MCNC: 39.7 PG/ML (ref 0–244)

## 2022-07-07 PROCEDURE — 93000 ELECTROCARDIOGRAM COMPLETE: CPT | Performed by: NURSE PRACTITIONER

## 2022-07-07 RX ORDER — LOSARTAN POTASSIUM 50 MG/1
50 TABLET ORAL DAILY
Qty: 30 TABLET | Refills: 11 | Status: SHIPPED | OUTPATIENT
Start: 2022-07-07 | End: 2022-09-30

## 2022-07-07 RX ORDER — METOPROLOL SUCCINATE 50 MG/1
75 TABLET, EXTENDED RELEASE ORAL DAILY
Qty: 45 TABLET | Refills: 11 | Status: SHIPPED | OUTPATIENT
Start: 2022-07-07 | End: 2022-07-14

## 2022-07-07 RX ORDER — BUSPIRONE HYDROCHLORIDE 10 MG/1
TABLET ORAL AS NEEDED
COMMUNITY

## 2022-07-07 RX ORDER — LOSARTAN POTASSIUM 50 MG/1
75 TABLET ORAL DAILY
Qty: 45 TABLET | Refills: 11 | Status: SHIPPED | OUTPATIENT
Start: 2022-07-07 | End: 2022-07-07

## 2022-07-07 NOTE — PROGRESS NOTES
Procedure     ECG 12 Lead    Date/Time: 7/7/2022 11:48 AM  Performed by: Tina Del Valle APRN  Authorized by: Tina Del Valle APRN   Comparison: compared with previous ECG from 6/29/2022  Similar to previous ECG  Rhythm: sinus rhythm  Rate: normal  BPM: 84  Other findings: left atrial abnormality    Clinical impression: non-specific ECG           Patient presents today for EKG only.  Tina also ordered 48 hr Holter and an Echo to be scheduled after the EKG was done.  ASHLEY Lobo reviewed the EKG and BP log brought in by Patient.  Jacquie instructed to increase Metoprolol Succ. 50 mg to 1.5 tablets every morning from 1 tablet every morning as this will help with BP and HR.   The 48 hr holter was placed this morning and patient will remove it on Saturday.  She is schedujled for the Echo on 7/13/22 at 7:00 am. ( Ok'd by Canelo in Echo Dept).  She will return the holter the day of her echo.  Patient was informed that it will take longer to get the results of the Holter by waiting until the echo appt to return it. Patient notified and verbalized understanding of all instructions given.   Medication list reviewed and updated./ TEDDY

## 2022-07-08 ENCOUNTER — TELEPHONE (OUTPATIENT)
Dept: INTERNAL MEDICINE | Facility: CLINIC | Age: 57
End: 2022-07-08

## 2022-07-08 NOTE — PROGRESS NOTES
Please let patient know that blood work finally came back as it is a send out test.  This is all normal and my suspicion for any adrenal overactivity is very low at this point in time.  I will review blood pressure log and contact her.

## 2022-07-13 ENCOUNTER — HOSPITAL ENCOUNTER (OUTPATIENT)
Dept: CARDIOLOGY | Facility: HOSPITAL | Age: 57
End: 2022-07-13

## 2022-07-13 ENCOUNTER — HOSPITAL ENCOUNTER (OUTPATIENT)
Dept: CARDIOLOGY | Facility: HOSPITAL | Age: 57
Discharge: HOME | End: 2022-07-13
Admitting: NURSE PRACTITIONER

## 2022-07-13 VITALS
HEART RATE: 93 BPM | WEIGHT: 186 LBS | SYSTOLIC BLOOD PRESSURE: 140 MMHG | HEIGHT: 66 IN | DIASTOLIC BLOOD PRESSURE: 50 MMHG | OXYGEN SATURATION: 99 % | BODY MASS INDEX: 29.89 KG/M2

## 2022-07-13 DIAGNOSIS — R00.0 TACHYCARDIA: ICD-10-CM

## 2022-07-13 DIAGNOSIS — R00.2 PALPITATIONS: ICD-10-CM

## 2022-07-13 LAB
AORTIC ARCH: 2.5 CM
AORTIC DIMENSIONLESS INDEX: 0.9 (DI)
ASCENDING AORTA: 2.7 CM
BH CV ECHO LEFT ATRIAL STRAIN: 38.4 %
BH CV ECHO MEAS - ACS: 1.74 CM
BH CV ECHO MEAS - AO MAX PG: 7.9 MMHG
BH CV ECHO MEAS - AO MEAN PG: 4.7 MMHG
BH CV ECHO MEAS - AO ROOT DIAM: 2.7 CM
BH CV ECHO MEAS - AO V2 MAX: 140.6 CM/SEC
BH CV ECHO MEAS - AO V2 VTI: 29.3 CM
BH CV ECHO MEAS - AVA(I,D): 2.9 CM2
BH CV ECHO MEAS - EDV(CUBED): 73.4 ML
BH CV ECHO MEAS - EDV(MOD-SP2): 74 ML
BH CV ECHO MEAS - EDV(MOD-SP4): 65 ML
BH CV ECHO MEAS - EF(MOD-BP): 65.8 %
BH CV ECHO MEAS - EF(MOD-SP2): 63.5 %
BH CV ECHO MEAS - EF(MOD-SP4): 66.2 %
BH CV ECHO MEAS - EF_3D-VOL: 54 %
BH CV ECHO MEAS - ESV(CUBED): 16.4 ML
BH CV ECHO MEAS - ESV(MOD-SP2): 27 ML
BH CV ECHO MEAS - ESV(MOD-SP4): 22 ML
BH CV ECHO MEAS - FS: 39.3 %
BH CV ECHO MEAS - IVS/LVPW: 1.12 CM
BH CV ECHO MEAS - IVSD: 1.11 CM
BH CV ECHO MEAS - LA 3D VOL INDEX: 30
BH CV ECHO MEAS - LAT PEAK E' VEL: 12.3 CM/SEC
BH CV ECHO MEAS - LV DIASTOLIC VOL/BSA (35-75): 33.5 CM2
BH CV ECHO MEAS - LV MASS(C)D: 146.2 GRAMS
BH CV ECHO MEAS - LV MAX PG: 5.9 MMHG
BH CV ECHO MEAS - LV MEAN PG: 3.4 MMHG
BH CV ECHO MEAS - LV SYSTOLIC VOL/BSA (12-30): 11.3 CM2
BH CV ECHO MEAS - LV V1 MAX: 121.8 CM/SEC
BH CV ECHO MEAS - LV V1 VTI: 27.3 CM
BH CV ECHO MEAS - LVIDD: 4.2 CM
BH CV ECHO MEAS - LVIDS: 2.5 CM
BH CV ECHO MEAS - LVOT AREA: 3.1 CM2
BH CV ECHO MEAS - LVOT DIAM: 1.98 CM
BH CV ECHO MEAS - LVPWD: 0.99 CM
BH CV ECHO MEAS - MED PEAK E' VEL: 12.4 CM/SEC
BH CV ECHO MEAS - MV A DUR: 0.08 SEC
BH CV ECHO MEAS - MV A MAX VEL: 109.9 CM/SEC
BH CV ECHO MEAS - MV DEC SLOPE: 741.2 CM/SEC2
BH CV ECHO MEAS - MV DEC TIME: 0.22 MSEC
BH CV ECHO MEAS - MV E MAX VEL: 99 CM/SEC
BH CV ECHO MEAS - MV E/A: 0.9
BH CV ECHO MEAS - MV MAX PG: 5.7 MMHG
BH CV ECHO MEAS - MV MEAN PG: 3 MMHG
BH CV ECHO MEAS - MV P1/2T: 40.2 MSEC
BH CV ECHO MEAS - MV V2 VTI: 30.4 CM
BH CV ECHO MEAS - MVA(P1/2T): 5.5 CM2
BH CV ECHO MEAS - MVA(VTI): 2.8 CM2
BH CV ECHO MEAS - PA ACC TIME: 0.1 SEC
BH CV ECHO MEAS - PA PR(ACCEL): 32.7 MMHG
BH CV ECHO MEAS - PA V2 MAX: 97.7 CM/SEC
BH CV ECHO MEAS - PULM A REVS DUR: 0.09 SEC
BH CV ECHO MEAS - PULM A REVS VEL: 36.7 CM/SEC
BH CV ECHO MEAS - PULM DIAS VEL: 55.2 CM/SEC
BH CV ECHO MEAS - PULM S/D: 1
BH CV ECHO MEAS - PULM SYS VEL: 55.2 CM/SEC
BH CV ECHO MEAS - QP/QS: 0.73
BH CV ECHO MEAS - RAP SYSTOLE: 3 MMHG
BH CV ECHO MEAS - RV MAX PG: 3.7 MMHG
BH CV ECHO MEAS - RV V1 MAX: 96.8 CM/SEC
BH CV ECHO MEAS - RV V1 VTI: 22.7 CM
BH CV ECHO MEAS - RVOT DIAM: 1.85 CM
BH CV ECHO MEAS - RVSP: 15.5 MMHG
BH CV ECHO MEAS - SI(MOD-SP2): 24.2 ML/M2
BH CV ECHO MEAS - SI(MOD-SP4): 22.2 ML/M2
BH CV ECHO MEAS - SUP REN AO DIAM: 1.9 CM
BH CV ECHO MEAS - SV(LVOT): 83.8 ML
BH CV ECHO MEAS - SV(MOD-SP2): 47 ML
BH CV ECHO MEAS - SV(MOD-SP4): 43 ML
BH CV ECHO MEAS - SV(RVOT): 61.2 ML
BH CV ECHO MEAS - TAPSE (>1.6): 2.21 CM
BH CV ECHO MEAS - TR MAX PG: 12.5 MMHG
BH CV ECHO MEAS - TR MAX VEL: 176.5 CM/SEC
BH CV ECHO MEASUREMENTS AVERAGE E/E' RATIO: 8.02
BH CV VAS BP RIGHT ARM: NORMAL MMHG
BH CV XLRA - RV BASE: 2.6 CM
BH CV XLRA - RV LENGTH: 7 CM
BH CV XLRA - RV MID: 2.6 CM
BH CV XLRA - TDI S': 8.9 CM/SEC
LEFT ATRIUM VOLUME INDEX: 21.4 ML/M2
MAXIMAL PREDICTED HEART RATE: 163 BPM
SINUS: 2.7 CM
STJ: 2.7 CM
STRESS TARGET HR: 139 BPM

## 2022-07-13 PROCEDURE — 25010000002 PERFLUTREN (DEFINITY) 8.476 MG IN SODIUM CHLORIDE (PF) 0.9 % 10 ML INJECTION: Performed by: NURSE PRACTITIONER

## 2022-07-13 PROCEDURE — 93306 TTE W/DOPPLER COMPLETE: CPT

## 2022-07-13 PROCEDURE — 93306 TTE W/DOPPLER COMPLETE: CPT | Performed by: INTERNAL MEDICINE

## 2022-07-13 RX ADMIN — PERFLUTREN 1.5 ML: 6.52 INJECTION, SUSPENSION INTRAVENOUS at 08:42

## 2022-07-14 ENCOUNTER — TELEPHONE (OUTPATIENT)
Dept: CARDIOLOGY | Facility: CLINIC | Age: 57
End: 2022-07-14

## 2022-07-14 RX ORDER — METOPROLOL SUCCINATE 50 MG/1
100 TABLET, EXTENDED RELEASE ORAL DAILY
Qty: 45 TABLET | Refills: 11
Start: 2022-07-14 | End: 2022-07-14

## 2022-07-14 RX ORDER — METOPROLOL SUCCINATE 100 MG/1
100 TABLET, EXTENDED RELEASE ORAL DAILY
Qty: 90 TABLET | Refills: 3 | Status: SHIPPED | OUTPATIENT
Start: 2022-07-14 | End: 2022-07-19

## 2022-07-14 NOTE — TELEPHONE ENCOUNTER
Alecia Hunter returned call to express concern about elevated HR readings in the morning.  Patient stated that her HR is only elevated first thing in the morning, like her medication is not lasting long enough.  Patient reports HR is between 102-126 first thing in the morning.  Patient reports taking her metoprolol and that HR returns to 70s-80s after about 30-45 minutes.  Patient provided the following readings:        Date Time BP HR Notes   6-Jul 635p 133/78 74     10:44p 137/68 69 2 drinks with dinner   7-Jul 942a 147/85 128        Cardiac Meds  Metoprolol XL 50 mg, 1.5 tablet daily (AM)  Losartan 50 mg, daily    Patient is awaiting Holter monitor result but is asking if a medication change needs to be made?    Please let me know how you would like to proceed.    Thank you,  Rosamaria Roa, RN  Triage Nurse [facility]

## 2022-07-14 NOTE — TELEPHONE ENCOUNTER
Reviewed recommendations with Alecia Hunter and the patient verbalized understanding of the recommendations.    Thank you,  Rosamaria Roa RN  Triage Nurse [facility]

## 2022-07-14 NOTE — TELEPHONE ENCOUNTER
Notified patient of results and recommendations. She verbalized understanding.    Dorothea Galarza, RN  Triage [facility]

## 2022-07-14 NOTE — TELEPHONE ENCOUNTER
----- Message from ASHLEY Reynoso sent at 7/14/2022 11:02 AM EDT -----  Please inform patient her echocardiogram shows her heart is strong and she has no valve disease.  There was plaque noted in her aorta so she should continue cholesterol medicine and keep her scheduled follow-up.  Let her know we will wait on the results of her 48-hour Holter which still are not back

## 2022-07-19 ENCOUNTER — TELEPHONE (OUTPATIENT)
Dept: CARDIOLOGY | Facility: CLINIC | Age: 57
End: 2022-07-19

## 2022-07-19 RX ORDER — METOPROLOL SUCCINATE 50 MG/1
75 TABLET, EXTENDED RELEASE ORAL 2 TIMES DAILY
Start: 2022-07-19 | End: 2022-07-19

## 2022-07-19 RX ORDER — METOPROLOL SUCCINATE 50 MG/1
75 TABLET, EXTENDED RELEASE ORAL 2 TIMES DAILY
Qty: 90 TABLET | Refills: 11 | Status: SHIPPED | OUTPATIENT
Start: 2022-07-19 | End: 2022-09-27 | Stop reason: SDUPTHER

## 2022-07-19 NOTE — TELEPHONE ENCOUNTER
That means she is taking 75 mg metoprolol in am and 50 mg at night? Have her increase metoprolol to 75 mg twice daily. That would be 1.5 tablet am and pm using a 50 mg metoprolol tablet. Please update med list and let me know if I need to refill her 50 mg metoprolol tablets.

## 2022-07-19 NOTE — TELEPHONE ENCOUNTER
Alecia Hunter notified of results and recommendations; patient verbalized understanding.    Patient states racing HR has improved since taking metoprolol but still having episodes. This morning when she woke up her HR was 121.    Patient states she is currently taking metoprolol XL 50mg 1.5 tablets each morning and 1 tablet at night. She states she received 100 mg tablets from her pharmacy and is asking how much she should be taking. Please advise.     Thank you,   Dorothea Sr RN  [facility]  07/19/22  14:59 EDT

## 2022-07-19 NOTE — TELEPHONE ENCOUNTER
----- Message from ASHLEY Reynoso sent at 7/19/2022  2:33 PM EDT -----  Please inform patient her monitor has resulted and is normal.  Keep September appointment as scheduled

## 2022-07-19 NOTE — TELEPHONE ENCOUNTER
Patient notified to take metoprolol XL 75 mg in the morning and at night with the 50 mg tablets. She verbalized understanding with repeat back.     Medication list has been updated, but she will need a refill of her 50 mg tablets. She would like these sent to [facility].    Thank you,   Dorothea Sr RN  [facility]  07/19/22  15:17 EDT

## 2022-08-17 ENCOUNTER — TELEPHONE (OUTPATIENT)
Dept: INTERNAL MEDICINE | Facility: CLINIC | Age: 57
End: 2022-08-17

## 2022-08-17 DIAGNOSIS — K25.3 ACUTE GASTRIC ULCER, UNSPECIFIED WHETHER GASTRIC ULCER HEMORRHAGE OR PERFORATION PRESENT: Primary | ICD-10-CM

## 2022-08-17 RX ORDER — OMEPRAZOLE 40 MG/1
40 CAPSULE, DELAYED RELEASE ORAL DAILY
Qty: 30 CAPSULE | Refills: 1 | Status: SHIPPED | OUTPATIENT
Start: 2022-08-17 | End: 2022-09-09

## 2022-08-17 NOTE — TELEPHONE ENCOUNTER
"  Caller: Alecia Patel    Relationship: Self    Best call back number: [phone]*    What medications are you currently taking:   Current Outpatient Medications on File Prior to Visit   Medication Sig Dispense Refill   • atorvastatin (LIPITOR) 20 MG tablet 20 mg.     • busPIRone (BUSPAR) 10 MG tablet Take two tablets by mouth 3 (Three) times daily     • busPIRone (BUSPAR) 5 MG tablet Take 2 tablets by mouth 3 (Three) Times a Day. 180 tablet 3   • Cholecalciferol (VITAMIN D3 PO) Take 2,000 Units by mouth.     • cyanocobalamin 1000 MCG/ML injection Inject 1 mL into the appropriate muscle as directed by prescriber 2 (Two) Times a Week. 75 mL 1   • levothyroxine (SYNTHROID, LEVOTHROID) 25 MCG tablet Take 1 tablet by mouth Daily. 200001 90 tablet 1   • losartan (COZAAR) 50 MG tablet Take 1 tablet by mouth Daily. 30 tablet 11   • meloxicam (MOBIC) 15 MG tablet Take 1 tablet by mouth Daily. (Patient taking differently: Take 15 mg by mouth Daily. Restart 6/23/22.) 90 tablet 3   • metoprolol succinate XL (TOPROL-XL) 50 MG 24 hr tablet Take 1.5 tablets by mouth 2 (Two) Times a Day. 90 tablet 11   • NON FORMULARY 2 (Two) Times a Day. Bio complete     • Syringe/Needle, Disp, 23G X 1\" 3 ML misc Use to inject vitamin b12 weekly 100 each 3   • valACYclovir (Valtrex) 500 MG tablet Take 1 tablet by mouth Daily. (Patient taking differently: Take 500 mg by mouth Daily. PRN) 90 tablet 3     No current facility-administered medications on file prior to visit.          When did you start taking these medications: 10+ YEARS     Which medication are you concerned about: MELOXICAM    Who prescribed you this medication: KATHY HOPKINS CURRENTLY    What are your concerns: PATIENT CALLING STATING THAT SHE IS STARTING TO DEVELOP A BURNING FEELING IN HER STOMACH AND IS CONCERNED IT COULD BE FROM TAKING THE MELOXICAM. THE PATIENT STATES THAT EVERY TIME SHE EATS SHE FEELS FULL AND BURNING. THE PATIENT STATES THAT SHE WILL TAKE A " TUMS AND FEEL BETTER, BUT STILL CONCERNED.    How long have you had these concerns: 3 DAYS

## 2022-09-09 ENCOUNTER — OFFICE VISIT (OUTPATIENT)
Dept: SLEEP MEDICINE | Facility: HOSPITAL | Age: 57
End: 2022-09-09

## 2022-09-09 VITALS
DIASTOLIC BLOOD PRESSURE: 63 MMHG | BODY MASS INDEX: 30.22 KG/M2 | OXYGEN SATURATION: 99 % | WEIGHT: 188 LBS | HEART RATE: 70 BPM | SYSTOLIC BLOOD PRESSURE: 123 MMHG | HEIGHT: 66 IN

## 2022-09-09 DIAGNOSIS — E66.9 OBESITY (BMI 30-39.9): ICD-10-CM

## 2022-09-09 DIAGNOSIS — R06.83 SNORING: ICD-10-CM

## 2022-09-09 DIAGNOSIS — K25.3 ACUTE GASTRIC ULCER, UNSPECIFIED WHETHER GASTRIC ULCER HEMORRHAGE OR PERFORATION PRESENT: ICD-10-CM

## 2022-09-09 DIAGNOSIS — G47.10 HYPERSOMNIA: Primary | ICD-10-CM

## 2022-09-09 PROCEDURE — G0463 HOSPITAL OUTPT CLINIC VISIT: HCPCS

## 2022-09-09 RX ORDER — OMEPRAZOLE 40 MG/1
40 CAPSULE, DELAYED RELEASE ORAL DAILY
Qty: 30 CAPSULE | Refills: 1 | Status: SHIPPED | OUTPATIENT
Start: 2022-09-09 | End: 2022-11-08

## 2022-09-09 NOTE — PROGRESS NOTES
[facility]  Telephone: [phone] / Fax: [phone] [place]  Telephone: [phone] / Fax: [phone] Jessica Mistry    Referring Physician: Dr Roman  PCP: Jackie Butler APRN    Reason for consult:  sleep apnea    Alecia Hunter is a 56 y.o.female  was seen in the [facility] Disorders Center today for evaluation of sleep apnea. She reports ongoing issues with HTN since November 2021. She has been waking up in the morning with heart racing, panic attacks. She started Paxil but it made it worse. She was started on BP medications. She has been dealing with tachycardia and required evaluation by cardiology and hospitalization. When she first wakes up, she still feels that her HR is elevated-over 100bpm. She is currently on Metoprolol and Losartan. She feels tired/sleepy on some days and has snoring-worse if she drinks alcohol. She reports night sweats.    SH- 2 wine per day, works as nail tech/salon owner, 1 caffeine per day.    ROS-+frequent urination, +post nasal drip, +myalgias, +anxiety, rest is negative.    Alecia Hunter  has a past medical history of Walking pneumonia.    Current Medications:    Current Outpatient Medications:   •  atorvastatin (LIPITOR) 20 MG tablet, 20 mg., Disp: , Rfl:   •  busPIRone (BUSPAR) 10 MG tablet, Take two tablets by mouth 3 (Three) times daily, Disp: , Rfl:   •  busPIRone (BUSPAR) 5 MG tablet, Take 2 tablets by mouth 3 (Three) Times a Day., Disp: 180 tablet, Rfl: 3  •  Cholecalciferol (VITAMIN D3 PO), Take 2,000 Units by mouth., Disp: , Rfl:   •  cyanocobalamin 1000 MCG/ML injection, Inject 1 mL into the appropriate muscle as directed by prescriber 2 (Two) Times a Week., Disp: 75 mL, Rfl: 1  •  levothyroxine (SYNTHROID, LEVOTHROID) 25 MCG tablet, Take 1 tablet by mouth Daily. 200001, Disp: 90 tablet, Rfl: 1  •  losartan (COZAAR) 50 MG tablet, Take 1 tablet by mouth Daily., Disp: 30 tablet, Rfl: 11  •  meloxicam (MOBIC) 15 MG  "tablet, Take 1 tablet by mouth Daily. (Patient taking differently: Take 15 mg by mouth Daily. Restart 6/23/22.), Disp: 90 tablet, Rfl: 3  •  metoprolol succinate XL (TOPROL-XL) 50 MG 24 hr tablet, Take 1.5 tablets by mouth 2 (Two) Times a Day., Disp: 90 tablet, Rfl: 11  •  NON FORMULARY, 2 (Two) Times a Day. Bio complete, Disp: , Rfl:   •  omeprazole (priLOSEC) 40 MG capsule, Take 1 capsule by mouth Daily for 60 days., Disp: 30 capsule, Rfl: 1  •  Syringe/Needle, Disp, 23G X 1\" 3 ML misc, Use to inject vitamin b12 weekly, Disp: 100 each, Rfl: 3  •  valACYclovir (Valtrex) 500 MG tablet, Take 1 tablet by mouth Daily. (Patient taking differently: Take 500 mg by mouth Daily. PRN), Disp: 90 tablet, Rfl: 3    I have reviewed Past Medical History, Past Surgical History, Medication List, Social History and Family History as entered in Sleep Questionnaire and EPIC.    ESS  1   Vital Signs /63   Pulse 70   Ht 167.6 cm (66\")   Wt 85.3 kg (188 lb)   SpO2 99%   BMI 30.34 kg/m²  Body mass index is 30.34 kg/m².    General Alert and oriented. No acute distress noted   Pharynx/Throat Class III Mallampati airway, large tongue, no evidence of redundant lateral pharyngeal tissue. No oral lesions. No thrush. Moist mucous membranes.   Head Normocephalic. Symmetrical. Atraumatic.    Nose No septal deviation. No drainage   Chest Wall Normal shape. Symmetric expansion with respiration. No tenderness.   Neck Trachea midline, no thyromegaly or adenopathy    Lungs Clear to auscultation bilaterally. No wheezes. No rhonchi. No rales. Respirations regular, even and unlabored.   Heart Regular rhythm and normal rate. Normal S1 and S2. No murmur   Abdomen Soft, non-tender and non-distended. Normal bowel sounds. No masses.   Extremities Moves all extremities well. No edema   Psychiatric Normal mood and affect.        Impression:  1. Hypersomnia    2. Snoring    3. Obesity (BMI 30-39.9)          Plan:  I discussed the pathophysiology of " obstructive sleep apnea with the patient.  We discussed the adverse outcomes associated with untreated sleep-disordered breathing.  We discussed treatment modalities of obstructive sleep apnea including CPAP device. Sleep study will be scheduled to establish a definitive diagnosis of sleep disorder breathing.  Weight loss will be strongly beneficial in order to reduce the severity of sleep-disordered breathing.  Patient has narrow oropharyngeal structure.  Caution during activities that require prolonged concentration is strongly advised.  After sleep study results are available, patient will be notified, and appointment will be scheduled to discuss sleep study results and treatment recommendations.    HST was scheduled.    I appreciate the opportunity to participate in this patient's care.      ASHLEY Hong  [facility]  Phone: [phone]      Part of this note may be an electronic transcription/translation of spoken language to printed text using the Dragon Dictation System. Some errors may exist even though the document was edited.

## 2022-09-13 ENCOUNTER — APPOINTMENT (OUTPATIENT)
Dept: CT IMAGING | Facility: HOSPITAL | Age: 57
End: 2022-09-13

## 2022-09-16 ENCOUNTER — HOSPITAL ENCOUNTER (OUTPATIENT)
Dept: CT IMAGING | Facility: HOSPITAL | Age: 57
Discharge: HOME | End: 2022-09-16
Admitting: INTERNAL MEDICINE

## 2022-09-16 DIAGNOSIS — R91.1 LUNG NODULE: ICD-10-CM

## 2022-09-16 PROCEDURE — 71250 CT THORAX DX C-: CPT

## 2022-09-16 RX ORDER — LEVOTHYROXINE SODIUM 0.03 MG/1
TABLET ORAL
Qty: 90 TABLET | Refills: 1 | Status: SHIPPED | OUTPATIENT
Start: 2022-09-16

## 2022-09-16 RX ORDER — ATORVASTATIN CALCIUM 20 MG/1
TABLET, FILM COATED ORAL
Qty: 30 TABLET | Refills: 2 | Status: SHIPPED | OUTPATIENT
Start: 2022-09-16

## 2022-09-23 ENCOUNTER — OFFICE VISIT (OUTPATIENT)
Dept: INTERNAL MEDICINE | Facility: CLINIC | Age: 57
End: 2022-09-23

## 2022-09-23 VITALS
DIASTOLIC BLOOD PRESSURE: 81 MMHG | SYSTOLIC BLOOD PRESSURE: 136 MMHG | TEMPERATURE: 97.7 F | OXYGEN SATURATION: 100 % | HEART RATE: 58 BPM | WEIGHT: 190 LBS | HEIGHT: 66 IN | BODY MASS INDEX: 30.53 KG/M2

## 2022-09-23 DIAGNOSIS — E78.2 MIXED HYPERLIPIDEMIA: ICD-10-CM

## 2022-09-23 DIAGNOSIS — Z98.84 GASTRIC BYPASS STATUS FOR OBESITY: ICD-10-CM

## 2022-09-23 DIAGNOSIS — F41.9 ANXIETY: ICD-10-CM

## 2022-09-23 DIAGNOSIS — G47.00 INSOMNIA, UNSPECIFIED TYPE: ICD-10-CM

## 2022-09-23 DIAGNOSIS — F33.0 MILD EPISODE OF RECURRENT MAJOR DEPRESSIVE DISORDER: ICD-10-CM

## 2022-09-23 DIAGNOSIS — Z13.1 SCREENING FOR DIABETES MELLITUS: ICD-10-CM

## 2022-09-23 DIAGNOSIS — E53.8 VITAMIN B12 DEFICIENCY: ICD-10-CM

## 2022-09-23 DIAGNOSIS — M54.9 CHRONIC BACK PAIN, UNSPECIFIED BACK LOCATION, UNSPECIFIED BACK PAIN LATERALITY: ICD-10-CM

## 2022-09-23 DIAGNOSIS — M25.551 BILATERAL HIP PAIN: ICD-10-CM

## 2022-09-23 DIAGNOSIS — Z98.82 HISTORY OF BREAST AUGMENTATION: ICD-10-CM

## 2022-09-23 DIAGNOSIS — I10 HYPERTENSION, UNSPECIFIED TYPE: ICD-10-CM

## 2022-09-23 DIAGNOSIS — M25.552 BILATERAL HIP PAIN: ICD-10-CM

## 2022-09-23 DIAGNOSIS — R91.1 LUNG NODULE: ICD-10-CM

## 2022-09-23 DIAGNOSIS — E03.9 HYPOTHYROIDISM, UNSPECIFIED TYPE: ICD-10-CM

## 2022-09-23 DIAGNOSIS — Z23 NEEDS FLU SHOT: ICD-10-CM

## 2022-09-23 DIAGNOSIS — G89.29 CHRONIC BACK PAIN, UNSPECIFIED BACK LOCATION, UNSPECIFIED BACK PAIN LATERALITY: ICD-10-CM

## 2022-09-23 DIAGNOSIS — Z00.00 ANNUAL PHYSICAL EXAM: Primary | ICD-10-CM

## 2022-09-23 PROBLEM — Z01.818 PREOP EXAMINATION: Status: RESOLVED | Noted: 2021-08-27 | Resolved: 2022-09-23

## 2022-09-23 PROCEDURE — 99396 PREV VISIT EST AGE 40-64: CPT | Performed by: NURSE PRACTITIONER

## 2022-09-23 PROCEDURE — 90471 IMMUNIZATION ADMIN: CPT | Performed by: NURSE PRACTITIONER

## 2022-09-23 PROCEDURE — 90686 IIV4 VACC NO PRSV 0.5 ML IM: CPT | Performed by: NURSE PRACTITIONER

## 2022-09-23 RX ORDER — DESVENLAFAXINE SUCCINATE 50 MG/1
TABLET, EXTENDED RELEASE ORAL
COMMUNITY
Start: 2022-09-08

## 2022-09-23 NOTE — ASSESSMENT & PLAN NOTE
Answered some questions from the patient.  Patient to continue following with pulmonology.  
Borderline.  Reviewed medications with patient as well as her home blood pressure log.  Patient has a visit with cardiologist next week.  We will hold off on any medication changes today.  Encouraged patient to bring her cuff with her to her cardiologist appointment to make sure that it is properly calibrated.  
Mammogram ordered by patient's OB/GYN.  Reminded patient that she is due.  
Medications reviewed.  Will monitor.  
No acute issues.  
Patient following with Ortho.  Discussed about physical therapy.  Patient has order from Ortho office.  Patient to continue following with specialist.  Discussed about medications.  
Patient has sleep study scheduled next month.  We will continue to monitor.  
Patient wishes to return to phentermine for weight loss.  We will hold at this time.  Patient to discuss with cardiologist next week.  
Stable.  Medications reviewed.  
Stable.  Medications reviewed.  
none

## 2022-09-23 NOTE — PROGRESS NOTES
Date of Encounter: 2022  Patient: Alecia Hunter,  1965    Subjective     Chief complaint: Annual physical    HPI   Patient here for annual physical.    Patient would like to update her blood work today.  Patient would like to check her thyroid as she has noticed some thinning hair.  Patient states that her blood pressure has been okay at home she has brought in a log for review.    Patient follows with pulmonary.  She also follows with cardiology on a regular basis.  Recently patient saw an orthopedic doctor for some muscle cramping she had had.  Is also scheduled to see sleep study.    Patient is interested in getting back on her weight loss medication.  Patient states that she tries to watch what she eats and does a lot of walking but still has weight that she would like to lose.  Patient admits to enjoying wine almost daily.  Denies any other sugary drinks.  Follows with a dentist on a regular basis.  Lives at home with her .      Review of Systems:  Negative for fever, congestion, chest pain upon exertion, shortness of breath, vision changes, vomiting, dysuria, lymphadenopathy, muscle weakness, numbness, mood changes, rashes.    The following portions of the patient's history were reviewed and updated as appropriate: allergies, current medications, past family history, past medical history, past social history, past surgical history and problem list.    Patient Active Problem List   Diagnosis   • Anxiety   • Back pain   • Insomnia   • Vitamin B12 deficiency   • Mild episode of recurrent major depressive disorder (HCC)   • Screening for diabetes mellitus   • Hypothyroidism   • Bilateral hip pain   • Gastric bypass status for obesity   • Herpes simplex type 2 infection   • Hypertension   • Mixed hyperlipidemia   • Lung nodule   • History of breast augmentation     Past Medical History:   Diagnosis Date   • Constipation 2/3/2016   • Depression 2/3/2016   • Preop examination  "2021   • Walking pneumonia    • Weight gain 2/3/2016     Past Surgical History:   Procedure Laterality Date   • AUGMENTATION MAMMAPLASTY Bilateral     ssaline   •  SECTION     • HERNIA REPAIR     • OOPHORECTOMY Left    • REDUCTION MAMMAPLASTY Bilateral    • STOMACH SURGERY       Family History   Problem Relation Age of Onset   • COPD Mother    • Cancer Father         colon cancer   • Colon cancer Father    • Heart attack Father 62       Current Outpatient Medications:   •  atorvastatin (LIPITOR) 20 MG tablet, TAKE 1 TABLET BY MOUTH EVERYDAY AT BEDTIME, Disp: 30 tablet, Rfl: 2  •  busPIRone (BUSPAR) 10 MG tablet, Take two tablets by mouth 3 (Three) times daily, Disp: , Rfl:   •  Cholecalciferol (VITAMIN D3 PO), Take 2,000 Units by mouth., Disp: , Rfl:   •  cyanocobalamin 1000 MCG/ML injection, Inject 1 mL into the appropriate muscle as directed by prescriber 2 (Two) Times a Week., Disp: 75 mL, Rfl: 1  •  desvenlafaxine (PRISTIQ) 50 MG 24 hr tablet, , Disp: , Rfl:   •  levothyroxine (SYNTHROID, LEVOTHROID) 25 MCG tablet, TAKE 1 TABLET BY MOUTH DAILY, Disp: 90 tablet, Rfl: 1  •  losartan (COZAAR) 50 MG tablet, Take 1 tablet by mouth Daily., Disp: 30 tablet, Rfl: 11  •  meloxicam (MOBIC) 15 MG tablet, Take 1 tablet by mouth Daily. (Patient taking differently: Take 15 mg by mouth Daily. Restart 22.), Disp: 90 tablet, Rfl: 3  •  metoprolol succinate XL (TOPROL-XL) 50 MG 24 hr tablet, Take 1.5 tablets by mouth 2 (Two) Times a Day. (Patient taking differently: Take 75 mg by mouth 2 (Two) Times a Day. 125mg BID), Disp: 90 tablet, Rfl: 11  •  NON FORMULARY, 2 (Two) Times a Day. Bio complete, Disp: , Rfl:   •  Syringe/Needle, Disp, 23G X 1\" 3 ML misc, Use to inject vitamin b12 weekly, Disp: 100 each, Rfl: 3  •  valACYclovir (Valtrex) 500 MG tablet, Take 1 tablet by mouth Daily. (Patient taking differently: Take 500 mg by mouth Daily. PRN), Disp: 90 tablet, Rfl: 3  •  busPIRone (BUSPAR) 5 MG tablet, " "Take 2 tablets by mouth 3 (Three) Times a Day., Disp: 180 tablet, Rfl: 3  •  omeprazole (priLOSEC) 40 MG capsule, TAKE 1 CAPSULE BY MOUTH DAILY FOR 60 DAYS., Disp: 30 capsule, Rfl: 1  No Known Allergies  Social History     Tobacco Use   • Smoking status: Never Smoker   • Smokeless tobacco: Never Used   Substance Use Topics   • Alcohol use: Yes     Comment: Social drinker;  caffeine use- coffee          Objective   Physical Exam   Vitals:    09/23/22 1203   BP: 136/81   Pulse: 58   Temp: 97.7 °F (36.5 °C)   TempSrc: Temporal   SpO2: 100%   Weight: 86.2 kg (190 lb)   Height: 167.6 cm (66\")     Body mass index is 30.67 kg/m².    Constitutional: NAD.  Eyes: EOMI. PERRLA. Normal conjunctiva.  Ear, nose, mouth, throat: No tonsillar exudates or erythema.   Normal nasal mucosa. Normal external ear canals and TMs bilaterally.  Cardiovascular: RRR. No murmurs. No LE edema b/l. Radial pulses 2+ bilaterally.  Pulmonary: CTA b/l. Good effort.  Integumentary: No rashes or wounds on face or upper extremities.  Lymphatic: No anterior cervical lymphadenopathy.  Endocrine: No thyromegaly or palpable thyroid nodules.  Psychiatric: Normal affect. Normal thought content.  Breast/: Deferred, patient follows with OB/GYN.         Assessment & Plan   Assessment and Plan  Pleasant 56-year-old female with hypertension, hyperlipidemia, hypothyroidism, history of breast augmentation, lung nodule and others here today for annual physical.  The following was discussed:    Diagnoses and all orders for this visit:    1. Annual physical exam (Primary)  -     Hemoglobin A1c  -     CBC & Differential  -     Comprehensive Metabolic Panel  -     Lipid Panel  -     Thyroid Panel With TSH   We discussed preventative care including age and patient-appropriate immunizations and cancer screening. We also discussed the importance of exercise and a healthy diet. This is their annual preventative exam.    2. Hypertension, unspecified type  Assessment & " Plan:  Borderline.  Reviewed medications with patient as well as her home blood pressure log.  Patient has a visit with cardiologist next week.  We will hold off on any medication changes today.  Encouraged patient to bring her cuff with her to her cardiologist appointment to make sure that it is properly calibrated.    Orders:  -     CBC & Differential  -     Comprehensive Metabolic Panel    3. Hypothyroidism, unspecified type  -     Thyroid Panel With TSH    4. Mixed hyperlipidemia  -     Lipid Panel    5. Screening for diabetes mellitus  -     Hemoglobin A1c    6. Vitamin B12 deficiency  Assessment & Plan:  Medications reviewed.  Will monitor.      7. Gastric bypass status for obesity  Assessment & Plan:  Patient wishes to return to phentermine for weight loss.  We will hold at this time.  Patient to discuss with cardiologist next week.      8. Anxiety  Assessment & Plan:  Stable.  Medications reviewed.      9. Mild episode of recurrent major depressive disorder (HCC)  Assessment & Plan:  Stable.  Medications reviewed.      10. Bilateral hip pain  Assessment & Plan:  Patient following with Ortho.  Discussed about physical therapy.  Patient has order from Ortho office.  Patient to continue following with specialist.  Discussed about medications.      11. Chronic back pain, unspecified back location, unspecified back pain laterality  Assessment & Plan:  No acute issues.      12. Insomnia, unspecified type  Assessment & Plan:  Patient has sleep study scheduled next month.  We will continue to monitor.      13. Needs flu shot  -     FluLaval/Fluzone >6 mos ([id number])    14. Lung nodule  Assessment & Plan:  Answered some questions from the patient.  Patient to continue following with pulmonology.      15. History of breast augmentation  Overview:  New implants September 2021.    Assessment & Plan:  Mammogram ordered by patient's OB/GYN.  Reminded patient that she is due.      Discussed with patient about plan of  care.  Answered some questions from the patient.  Patient verbalized understanding of and agreed to plan of care.    Return in about 6 months (around 3/23/2023) for Follow Up.     Jackie Butler DNP, FNP-C  Family Medicine  O: [phone]      Please note that portions of this note were completed with a voice recognition program. Please call if questions.

## 2022-09-24 LAB
ALBUMIN SERPL-MCNC: 4.7 G/DL (ref 3.8–4.9)
ALBUMIN/GLOB SERPL: 1.6 {RATIO} (ref 1.2–2.2)
ALP SERPL-CCNC: 136 IU/L (ref 44–121)
ALT SERPL-CCNC: 30 IU/L (ref 0–32)
AST SERPL-CCNC: 56 IU/L (ref 0–40)
BASOPHILS # BLD AUTO: 0.1 X10E3/UL (ref 0–0.2)
BASOPHILS NFR BLD AUTO: 2 %
BILIRUB SERPL-MCNC: 0.3 MG/DL (ref 0–1.2)
BUN SERPL-MCNC: 12 MG/DL (ref 6–24)
BUN/CREAT SERPL: 15 (ref 9–23)
CALCIUM SERPL-MCNC: 9.5 MG/DL (ref 8.7–10.2)
CHLORIDE SERPL-SCNC: 96 MMOL/L (ref 96–106)
CHOLEST SERPL-MCNC: 227 MG/DL (ref 100–199)
CO2 SERPL-SCNC: 23 MMOL/L (ref 20–29)
CREAT SERPL-MCNC: 0.8 MG/DL (ref 0.57–1)
EGFRCR SERPLBLD CKD-EPI 2021: 86 ML/MIN/1.73
EOSINOPHIL # BLD AUTO: 0.2 X10E3/UL (ref 0–0.4)
EOSINOPHIL NFR BLD AUTO: 3 %
ERYTHROCYTE [DISTWIDTH] IN BLOOD BY AUTOMATED COUNT: 16 % (ref 11.7–15.4)
FT4I SERPL CALC-MCNC: 1.8 (ref 1.2–4.9)
GLOBULIN SER CALC-MCNC: 2.9 G/DL (ref 1.5–4.5)
GLUCOSE SERPL-MCNC: 88 MG/DL (ref 65–99)
HBA1C MFR BLD: 5.6 % (ref 4.8–5.6)
HCT VFR BLD AUTO: 33.3 % (ref 34–46.6)
HDLC SERPL-MCNC: 114 MG/DL
HGB BLD-MCNC: 10.3 G/DL (ref 11.1–15.9)
IMM GRANULOCYTES # BLD AUTO: 0 X10E3/UL (ref 0–0.1)
IMM GRANULOCYTES NFR BLD AUTO: 0 %
LDLC SERPL CALC-MCNC: 82 MG/DL (ref 0–99)
LYMPHOCYTES # BLD AUTO: 1.6 X10E3/UL (ref 0.7–3.1)
LYMPHOCYTES NFR BLD AUTO: 36 %
MCH RBC QN AUTO: 25.3 PG (ref 26.6–33)
MCHC RBC AUTO-ENTMCNC: 30.9 G/DL (ref 31.5–35.7)
MCV RBC AUTO: 82 FL (ref 79–97)
MONOCYTES # BLD AUTO: 0.4 X10E3/UL (ref 0.1–0.9)
MONOCYTES NFR BLD AUTO: 9 %
NEUTROPHILS # BLD AUTO: 2.3 X10E3/UL (ref 1.4–7)
NEUTROPHILS NFR BLD AUTO: 50 %
PLATELET # BLD AUTO: 370 X10E3/UL (ref 150–450)
POTASSIUM SERPL-SCNC: 4.9 MMOL/L (ref 3.5–5.2)
PROT SERPL-MCNC: 7.6 G/DL (ref 6–8.5)
RBC # BLD AUTO: 4.07 X10E6/UL (ref 3.77–5.28)
SODIUM SERPL-SCNC: 136 MMOL/L (ref 134–144)
T3RU NFR SERPL: 27 % (ref 24–39)
T4 SERPL-MCNC: 6.8 UG/DL (ref 4.5–12)
TRIGL SERPL-MCNC: 191 MG/DL (ref 0–149)
TSH SERPL DL<=0.005 MIU/L-ACNC: 1.43 UIU/ML (ref 0.45–4.5)
VLDLC SERPL CALC-MCNC: 31 MG/DL (ref 5–40)
WBC # BLD AUTO: 4.6 X10E3/UL (ref 3.4–10.8)

## 2022-09-26 DIAGNOSIS — R74.8 ELEVATED LIVER ENZYMES: Primary | ICD-10-CM

## 2022-09-26 RX ORDER — DOXYCYCLINE HYCLATE 50 MG/1
324 CAPSULE, GELATIN COATED ORAL EVERY OTHER DAY
Qty: 30 TABLET | Refills: 3 | Status: SHIPPED | OUTPATIENT
Start: 2022-09-26 | End: 2022-09-26 | Stop reason: SDUPTHER

## 2022-09-26 RX ORDER — DOXYCYCLINE HYCLATE 50 MG/1
324 CAPSULE, GELATIN COATED ORAL EVERY OTHER DAY
Qty: 30 TABLET | Refills: 3 | Status: SHIPPED | OUTPATIENT
Start: 2022-09-26 | End: 2022-10-19

## 2022-09-27 RX ORDER — METOPROLOL SUCCINATE 50 MG/1
75 TABLET, EXTENDED RELEASE ORAL 2 TIMES DAILY
Qty: 270 TABLET | Refills: 2 | Status: SHIPPED | OUTPATIENT
Start: 2022-09-27

## 2022-09-30 ENCOUNTER — TELEPHONE (OUTPATIENT)
Dept: INTERNAL MEDICINE | Facility: CLINIC | Age: 57
End: 2022-09-30

## 2022-09-30 ENCOUNTER — OFFICE VISIT (OUTPATIENT)
Dept: CARDIOLOGY | Facility: CLINIC | Age: 57
End: 2022-09-30

## 2022-09-30 VITALS
BODY MASS INDEX: 30.34 KG/M2 | OXYGEN SATURATION: 96 % | HEART RATE: 78 BPM | DIASTOLIC BLOOD PRESSURE: 74 MMHG | SYSTOLIC BLOOD PRESSURE: 110 MMHG | WEIGHT: 188.8 LBS | HEIGHT: 66 IN

## 2022-09-30 DIAGNOSIS — I10 PRIMARY HYPERTENSION: Primary | ICD-10-CM

## 2022-09-30 PROCEDURE — 99214 OFFICE O/P EST MOD 30 MIN: CPT | Performed by: INTERNAL MEDICINE

## 2022-09-30 RX ORDER — LOSARTAN POTASSIUM 100 MG/1
100 TABLET ORAL DAILY
Qty: 30 TABLET | Refills: 5 | Status: SHIPPED | OUTPATIENT
Start: 2022-09-30

## 2022-09-30 NOTE — TELEPHONE ENCOUNTER
PATIENT IS CALLING IN SHE STATES THAT SHE REMEMBERED  THAT SHE HAS ABSORPTION ISSUES WITH THE IRON AND WOULD LIKE TO GO AHEAD WITH THE IRON INFUSIONS SHE DOES NOT THINK THE PILLS WILL HELP HER.    PLEASE ADVISE    CALLBACK NUMBER IS  [phone]

## 2022-09-30 NOTE — PROGRESS NOTES
[facility]      Patient Name: Alecia Hunter  :1965  Age: 56 y.o.  Encounter Provider:  Delvin Roman Jr, MD      Chief Complaint:   No chief complaint on file.        HPI  Alecia Hunter is a 56 y.o. female with past medical history of anxiety and hypertension who presents for follow-up evaluation.      Summary of clinic visitation: Patient was noticing worsening feelings of anxiety over the last 6 months.  In May she had a couple of episodes of severe palpitations and dizziness.  She did not seek medical therapy at the time.  In  she had some severe episodes and came to [facility] ER.  There she was found to have severely elevated blood pressure and heart rate and was admitted for cardiac work-up.  Supposedly a positive stress test resulted in cardiac catheterization for which there was no need for intervention.  Records are not available at time of interview.  Since leaving the hospital her blood pressure is somewhat better controlled but still quite labile as is her heart rate.  She denies chest pain or shortness of air.  She works as a  and does a lot of walking during the day.  She has anxiety with being addressed with buspirone.  She has difficulty sleeping wakes up several times per night.  She does not think that she snores but her  wears a CPAP and has never mentioned anything to her.  She is a lifelong non-smoker drinks socially and denies illicit drug use.  Father had heart attack at age 62 but lived into his 80s and  of colon cancer.    Echo performed showing normal EF and no significant valvular heart disease.  Holter performed showing no sustained arrhythmias.  Patient continued to have frequent tachycardia especially in the morning and beta-blocker has been uptitrated.  Blood pressure is under better control but not optimal.  Blood pressure log she brings in shows much less variation in blood pressure.  She has  "been evaluated by sleep clinic and is getting sleep study.  Urine metanephrines were within normal range.  Fair functional capacity with no limiting symptoms.  Looks as though blood pressure average is 150/90 mmHg.  No cardiac complaints at time of interview.    The following portions of the patient's history were reviewed and updated as appropriate: allergies, current medications, past family history, past medical history, past social history, past surgical history and problem list.      Review of Systems   Constitutional: Negative for chills and fever.   HENT: Negative for hoarse voice and sore throat.    Eyes: Negative for double vision and photophobia.   Cardiovascular: Positive for palpitations. Negative for chest pain, leg swelling, near-syncope, orthopnea, paroxysmal nocturnal dyspnea and syncope.   Respiratory: Negative for cough and wheezing.    Skin: Negative for poor wound healing and rash.   Musculoskeletal: Negative for arthritis and joint swelling.   Gastrointestinal: Negative for bloating, abdominal pain, hematemesis and hematochezia.   Neurological: Negative for dizziness and focal weakness.   Psychiatric/Behavioral: Negative for depression and suicidal ideas. The patient is nervous/anxious.        OBJECTIVE:   Vital Signs  Vitals:    09/30/22 1039   BP: 110/74   Pulse: 78   SpO2: 96%     Estimated body mass index is 30.47 kg/m² as calculated from the following:    Height as of this encounter: 167.6 cm (66\").    Weight as of this encounter: 85.6 kg (188 lb 12.8 oz).    Vitals reviewed.   Constitutional:       Appearance: Healthy appearance. Not in distress.   Neck:      Vascular: No JVR. JVD normal.   Pulmonary:      Effort: Pulmonary effort is normal.      Breath sounds: Normal breath sounds. No wheezing. No rhonchi. No rales.   Chest:      Chest wall: Not tender to palpatation.   Cardiovascular:      PMI at left midclavicular line. Normal rate. Regular rhythm. Normal S1. Normal S2.      Murmurs: " There is no murmur.      No gallop. No click. No rub.   Pulses:     Intact distal pulses.   Edema:     Peripheral edema absent.   Abdominal:      General: Bowel sounds are normal.      Palpations: Abdomen is soft.      Tenderness: There is no abdominal tenderness.   Musculoskeletal: Normal range of motion.         General: No tenderness. Skin:     General: Skin is warm and dry.   Neurological:      General: No focal deficit present.      Mental Status: Alert and oriented to person, place and time.         Procedures          ASSESSMENT:     Uncontrolled hypertension  Paroxysmal tachycardia  Anxiety  Dyslipidemia    PLAN OF CARE:     1. Uncontrolled hypertension -increase losartan to 100 mg.  Her potassium has historically ranged in the upper limit of normal on 50 mg.  BMP in 2 weeks.  Blood pressure log to be sent in after 1 week.  Interestingly today in clinic her blood pressure is well controlled.  She is to bring her blood pressure cuff in next week for a check with comparison against manual cuff pressures.  Renal artery Doppler will be scheduled.  2. Paroxysmal tachycardia -as above.  Monitor closely.  She is already on very high dose beta-blocker but tolerating well.  3. Anxiety -patient continues to work with psychiatry about better control.  Recommend increasing exercise levels.  Sleep study scheduled.  4. Insomnia  5. Dyslipidemia    Return to clinic 6 months             Discharge Medications          Accurate as of September 30, 2022 10:40 AM. If you have any questions, ask your nurse or doctor.            Changes to Medications      Instructions Start Date   meloxicam 15 MG tablet  Commonly known as: MOBIC  What changed: additional instructions   15 mg, Oral, Daily      metoprolol succinate XL 50 MG 24 hr tablet  Commonly known as: TOPROL-XL  What changed: additional instructions   75 mg, Oral, 2 Times Daily      valACYclovir 500 MG tablet  Commonly known as: Valtrex  What changed: additional  "instructions   500 mg, Oral, Daily         Continue These Medications      Instructions Start Date   atorvastatin 20 MG tablet  Commonly known as: LIPITOR   TAKE 1 TABLET BY MOUTH EVERYDAY AT BEDTIME      busPIRone 10 MG tablet  Commonly known as: BUSPAR   As Needed, Take two tablets by mouth 3 (Three) times daily      busPIRone 5 MG tablet  Commonly known as: BUSPAR   10 mg, Oral, 3 Times Daily      COLACE PO   Oral      cyanocobalamin 1000 MCG/ML injection   1,000 mcg, Intramuscular, 2 Times Weekly      desvenlafaxine 50 MG 24 hr tablet  Commonly known as: PRISTIQ   No dose, route, or frequency recorded.      ferrous gluconate 324 MG tablet  Commonly known as: FERGON   324 mg, Oral, Every Other Day      levothyroxine 25 MCG tablet  Commonly known as: SYNTHROID, LEVOTHROID   TAKE 1 TABLET BY MOUTH DAILY      losartan 50 MG tablet  Commonly known as: COZAAR   50 mg, Oral, Daily      NON FORMULARY   2 Times Daily, Bio complete      omeprazole 40 MG capsule  Commonly known as: priLOSEC   40 mg, Oral, Daily      Syringe/Needle (Disp) 23G X 1\" 3 ML misc   Use to inject vitamin b12 weekly      VITAMIN D3 PO   2,000 Units, Oral             Thank you for allowing me to participate in the care of your patient,      Sincerely,   Delvin Roman MD  [facility]  09/30/22  10:40 EDT    "

## 2022-10-04 NOTE — TELEPHONE ENCOUNTER
Spoke to pt to advise of provider's message, pt states that the reason she called is because she has absorption issues since having gastric bypass, she states that Vitamin K and Iron doesn't absorb well.    Please advise.

## 2022-10-05 NOTE — TELEPHONE ENCOUNTER
Spoke to pt about providers message, pt understanding. Pt was informed of Jackie Butler DNP departure and sent letter via [employer] on provider suggestions.

## 2022-10-14 ENCOUNTER — LAB (OUTPATIENT)
Dept: LAB | Facility: HOSPITAL | Age: 57
End: 2022-10-14

## 2022-10-14 ENCOUNTER — HOSPITAL ENCOUNTER (OUTPATIENT)
Dept: SLEEP MEDICINE | Facility: HOSPITAL | Age: 57
Discharge: HOME | End: 2022-10-14
Admitting: NURSE PRACTITIONER

## 2022-10-14 DIAGNOSIS — R06.83 SNORING: ICD-10-CM

## 2022-10-14 DIAGNOSIS — I10 PRIMARY HYPERTENSION: ICD-10-CM

## 2022-10-14 DIAGNOSIS — G47.10 HYPERSOMNIA: ICD-10-CM

## 2022-10-14 LAB
ANION GAP SERPL CALCULATED.3IONS-SCNC: 11 MMOL/L (ref 5–15)
BUN SERPL-MCNC: 14 MG/DL (ref 6–20)
BUN/CREAT SERPL: 15.1 (ref 7–25)
CALCIUM SPEC-SCNC: 9.8 MG/DL (ref 8.6–10.5)
CHLORIDE SERPL-SCNC: 104 MMOL/L (ref 98–107)
CO2 SERPL-SCNC: 27 MMOL/L (ref 22–29)
CREAT SERPL-MCNC: 0.93 MG/DL (ref 0.57–1)
EGFRCR SERPLBLD CKD-EPI 2021: 71.4 ML/MIN/1.73
GLUCOSE SERPL-MCNC: 85 MG/DL (ref 65–99)
POTASSIUM SERPL-SCNC: 4.9 MMOL/L (ref 3.5–5.2)
SODIUM SERPL-SCNC: 142 MMOL/L (ref 136–145)

## 2022-10-14 PROCEDURE — 95806 SLEEP STUDY UNATT&RESP EFFT: CPT

## 2022-10-14 PROCEDURE — 80048 BASIC METABOLIC PNL TOTAL CA: CPT

## 2022-10-14 PROCEDURE — 36415 COLL VENOUS BLD VENIPUNCTURE: CPT

## 2022-10-18 NOTE — TELEPHONE ENCOUNTER
Rx Refill Note  Requested Prescriptions     Pending Prescriptions Disp Refills   • ferrous gluconate (FERGON) 324 MG tablet [Pharmacy Med Name: FERROUS GLUCONATE 324 MG TAB] 15 tablet 7     Sig: TAKE 1 TABLET BY MOUTH EVERY OTHER DAY      Last office visit with prescribing clinician: 9/23/2022      Next office visit with prescribing clinician: Visit date not found            Sachi Alexander MA  10/18/22, 10:23 EDT

## 2022-10-19 RX ORDER — DOXYCYCLINE HYCLATE 50 MG/1
324 CAPSULE, GELATIN COATED ORAL EVERY OTHER DAY
Qty: 45 TABLET | Refills: 3 | Status: SHIPPED | OUTPATIENT
Start: 2022-10-19

## 2022-10-20 RX ORDER — ATORVASTATIN CALCIUM 20 MG/1
TABLET, FILM COATED ORAL
Qty: 90 TABLET | OUTPATIENT
Start: 2022-10-20

## 2022-11-04 ENCOUNTER — OFFICE VISIT (OUTPATIENT)
Dept: GASTROENTEROLOGY | Facility: CLINIC | Age: 57
End: 2022-11-04

## 2022-11-04 ENCOUNTER — LAB (OUTPATIENT)
Dept: LAB | Facility: HOSPITAL | Age: 57
End: 2022-11-04

## 2022-11-04 ENCOUNTER — HOSPITAL ENCOUNTER (OUTPATIENT)
Dept: CARDIOLOGY | Facility: HOSPITAL | Age: 57
Discharge: HOME | End: 2022-11-04

## 2022-11-04 VITALS
HEART RATE: 70 BPM | SYSTOLIC BLOOD PRESSURE: 121 MMHG | BODY MASS INDEX: 30.6 KG/M2 | HEIGHT: 66 IN | TEMPERATURE: 96.3 F | DIASTOLIC BLOOD PRESSURE: 81 MMHG | WEIGHT: 190.4 LBS

## 2022-11-04 DIAGNOSIS — R74.8 ELEVATED LIVER ENZYMES: Primary | ICD-10-CM

## 2022-11-04 DIAGNOSIS — I10 PRIMARY HYPERTENSION: ICD-10-CM

## 2022-11-04 LAB
BH CV ECHO MEAS - DIST REN A EDV LEFT: 19 CM/S
BH CV ECHO MEAS - DIST REN A PSV LEFT: 100.2 CM/S
BH CV ECHO MEAS - MID REN A EDV LEFT: 16.7 CM/S
BH CV ECHO MEAS - MID REN A PSV LEFT: 111.3 CM/S
BH CV ECHO MEAS - PROX REN A EDV LEFT: -38.8 CM/S
BH CV ECHO MEAS - PROX REN A PSV LEFT: -175.3 CM/S
BH CV VAS BP LEFT ARM: NORMAL MMHG
BH CV VAS BP RIGHT ARM: NORMAL MMHG
BH CV VAS RENAL AORTIC MID PSV: 136 CM/S
BH CV VAS RENAL HILUM LEFT EDV: 8.1 CM/S
BH CV VAS RENAL HILUM LEFT PSV: 37 CM/S
BH CV VAS RENAL HILUM RIGHT EDV: 9.9 CM/S
BH CV VAS RENAL HILUM RIGHT PSV: 51.2 CM/S
BH CV XLRA MEAS - KID L LEFT: 10.3 CM
BH CV XLRA MEAS DIST REN A EDV RIGHT: 23.1 CM/S
BH CV XLRA MEAS DIST REN A PSV RIGHT: 149.4 CM/S
BH CV XLRA MEAS KID L RIGHT: 10.1 CM
BH CV XLRA MEAS KID W RIGHT: 5.1 CM
BH CV XLRA MEAS MID REN A EDV RIGHT: 34 CM/S
BH CV XLRA MEAS MID REN A PSV RIGHT: 143 CM/S
BH CV XLRA MEAS PROX REN A EDV RIGHT: -38.4 CM/S
BH CV XLRA MEAS PROX REN A PSV RIGHT: -169.1 CM/S
BH CV XLRA MEAS RAR LEFT: 1.28
BH CV XLRA MEAS RAR RIGHT: 1.24
BH CV XLRA MEAS RENAL A ORG EDV LEFT: -33.4 CM/S
BH CV XLRA MEAS RENAL A ORG EDV RIGHT: -19.8 CM/S
BH CV XLRA MEAS RENAL A ORG PSV LEFT: -164 CM/S
BH CV XLRA MEAS RENAL A ORG PSV RIGHT: -159.2 CM/S
HAV IGM SERPL QL IA: NORMAL
HBV CORE IGM SERPL QL IA: NORMAL
HBV SURFACE AG SERPL QL IA: NORMAL
HCV AB SER DONR QL: NORMAL
IGG1 SER-MCNC: 1052 MG/DL (ref 700–1600)
INR PPP: 0.92 (ref 0.9–1.1)
IRON 24H UR-MRATE: 37 MCG/DL (ref 37–145)
LEFT KIDNEY WIDTH: 5.1 CM
LEFT RENAL UPPER PARENCHYMA MAX: 18.6 CM/S
LEFT RENAL UPPER PARENCHYMA MIN: 4.82 CM/S
LEFT RENAL UPPER PARENCHYMA RI: 0.74
MAXIMAL PREDICTED HEART RATE: 162 BPM
PROTHROMBIN TIME: 12.5 SECONDS (ref 11.7–14.2)
RIGHT RENAL UPPER PARENCHYMA MAX: 38.7 CM/S
RIGHT RENAL UPPER PARENCHYMA MIN: 9.89 CM/S
RIGHT RENAL UPPER PARENCHYMA RI: 0.74
STRESS TARGET HR: 138 BPM

## 2022-11-04 PROCEDURE — 83540 ASSAY OF IRON: CPT | Performed by: PHYSICIAN ASSISTANT

## 2022-11-04 PROCEDURE — 85610 PROTHROMBIN TIME: CPT | Performed by: PHYSICIAN ASSISTANT

## 2022-11-04 PROCEDURE — 86038 ANTINUCLEAR ANTIBODIES: CPT | Performed by: PHYSICIAN ASSISTANT

## 2022-11-04 PROCEDURE — 36415 COLL VENOUS BLD VENIPUNCTURE: CPT | Performed by: PHYSICIAN ASSISTANT

## 2022-11-04 PROCEDURE — 86381 MITOCHONDRIAL ANTIBODY EACH: CPT | Performed by: PHYSICIAN ASSISTANT

## 2022-11-04 PROCEDURE — 80074 ACUTE HEPATITIS PANEL: CPT | Performed by: PHYSICIAN ASSISTANT

## 2022-11-04 PROCEDURE — 82784 ASSAY IGA/IGD/IGG/IGM EACH: CPT | Performed by: PHYSICIAN ASSISTANT

## 2022-11-04 PROCEDURE — 93975 VASCULAR STUDY: CPT

## 2022-11-04 PROCEDURE — 99204 OFFICE O/P NEW MOD 45 MIN: CPT | Performed by: PHYSICIAN ASSISTANT

## 2022-11-04 PROCEDURE — 86015 ACTIN ANTIBODY EACH: CPT | Performed by: PHYSICIAN ASSISTANT

## 2022-11-04 PROCEDURE — 81256 HFE GENE: CPT | Performed by: PHYSICIAN ASSISTANT

## 2022-11-04 RX ORDER — LOSARTAN POTASSIUM 50 MG/1
50 TABLET ORAL DAILY
COMMUNITY
Start: 2022-10-03

## 2022-11-04 NOTE — PROGRESS NOTES
Chief Complaint  Elevated Hepatic Enzymes    Subjective        History of Present Illness  Alecia Hunter is a  57 y.o. female here for new over three year visit.  She is a patient of Dr. Malik, new to me today.  Past surgical history significant for gastric bypass.     She presents today for elevation in liver function testing.  She has had a mild persistent AST elevation, transient elevation in ALT and mildly elevated alkaline phosphatase.  No recent imaging of liver; last CT abd/pelvis reviewed from  with normal appearing liver.  No family history of liver disease.  She has undergone a gastric bypass surgical procedure for weight reduction.  She drinks 1 to 2 glasses of wine nightly.      Father  secondary to colon cancer.    Colonoscopy 2018: Nonbleeding internal hemorrhoids, benign polyp.  Recall 5 years.    Past Medical History:   Diagnosis Date   • Constipation 2/3/2016   • Depression 2/3/2016   • Preop examination 2021   • Walking pneumonia    • Weight gain 2/3/2016       Past Surgical History:   Procedure Laterality Date   • AUGMENTATION MAMMAPLASTY Bilateral     ssaline   •  SECTION     • COLONOSCOPY      LEC   • HERNIA REPAIR     • OOPHORECTOMY Left    • REDUCTION MAMMAPLASTY Bilateral    • STOMACH SURGERY         Family History   Problem Relation Age of Onset   • COPD Mother    • Cancer Father         colon cancer   • Colon cancer Father    • Heart attack Father 62       Social History     Socioeconomic History   • Marital status:    Tobacco Use   • Smoking status: Never   • Smokeless tobacco: Never   Substance and Sexual Activity   • Alcohol use: Yes     Comment: Social drinker;  caffeine use- coffee       No Known Allergies    Current Outpatient Medications on File Prior to Visit   Medication Sig Dispense Refill   • atorvastatin (LIPITOR) 20 MG tablet TAKE 1 TABLET BY MOUTH EVERYDAY AT BEDTIME 30 tablet 2   • busPIRone (BUSPAR) 10 MG tablet  "As Needed. Take two tablets by mouth 3 (Three) times daily     • busPIRone (BUSPAR) 5 MG tablet Take 2 tablets by mouth 3 (Three) Times a Day. 180 tablet 3   • Cholecalciferol (VITAMIN D3 PO) Take 2,000 Units by mouth.     • cyanocobalamin 1000 MCG/ML injection Inject 1 mL into the appropriate muscle as directed by prescriber 2 (Two) Times a Week. 75 mL 1   • desvenlafaxine (PRISTIQ) 50 MG 24 hr tablet      • Docusate Sodium (COLACE PO) Take  by mouth.     • ferrous gluconate (FERGON) 324 MG tablet TAKE 1 TABLET BY MOUTH EVERY OTHER DAY 45 tablet 3   • levothyroxine (SYNTHROID, LEVOTHROID) 25 MCG tablet TAKE 1 TABLET BY MOUTH DAILY 90 tablet 1   • losartan (Cozaar) 100 MG tablet Take 1 tablet by mouth Daily. 30 tablet 5   • losartan (COZAAR) 50 MG tablet Take 1 tablet by mouth Daily.     • meloxicam (MOBIC) 15 MG tablet Take 1 tablet by mouth Daily. (Patient taking differently: Take 1 tablet by mouth Daily. Restart 6/23/22.) 90 tablet 3   • metoprolol succinate XL (TOPROL-XL) 50 MG 24 hr tablet Take 1.5 tablets by mouth 2 (Two) Times a Day. (Patient taking differently: Take 1.5 tablets by mouth 2 (Two) Times a Day. Pt reports taking 125mg twice daily) 270 tablet 2   • NON FORMULARY 2 (Two) Times a Day. Bio complete     • omeprazole (priLOSEC) 40 MG capsule TAKE 1 CAPSULE BY MOUTH DAILY FOR 60 DAYS. 30 capsule 1   • Syringe/Needle, Disp, 23G X 1\" 3 ML misc Use to inject vitamin b12 weekly 100 each 3   • valACYclovir (Valtrex) 500 MG tablet Take 1 tablet by mouth Daily. (Patient taking differently: Take 1 tablet by mouth Daily. PRN) 90 tablet 3     No current facility-administered medications on file prior to visit.       Review of Systems     Objective   Vital Signs:   /81   Pulse 70   Temp 96.3 °F (35.7 °C)   Ht 167.6 cm (66\")   Wt 86.4 kg (190 lb 6.4 oz)   BMI 30.73 kg/m²       Physical Exam  Vitals and nursing note reviewed.   Constitutional:       General: She is not in acute distress.     Appearance: " Normal appearance. She is not ill-appearing.   HENT:      Head: Normocephalic and atraumatic.      Right Ear: External ear normal.      Left Ear: External ear normal.   Eyes:      General: No scleral icterus.     Conjunctiva/sclera: Conjunctivae normal.      Pupils: Pupils are equal, round, and reactive to light.   Cardiovascular:      Rate and Rhythm: Normal rate and regular rhythm.      Heart sounds: Normal heart sounds.   Pulmonary:      Effort: Pulmonary effort is normal.      Breath sounds: Normal breath sounds.   Abdominal:      General: Abdomen is flat. Bowel sounds are normal. There is no distension.      Palpations: Abdomen is soft.      Tenderness: There is no abdominal tenderness. There is no guarding or rebound.   Musculoskeletal:      Cervical back: Normal range of motion and neck supple.   Skin:     General: Skin is warm and dry.   Neurological:      Mental Status: She is alert and oriented to person, place, and time.   Psychiatric:         Mood and Affect: Mood normal.         Behavior: Behavior normal.          Result Review :       Common labs    Common Labs 6/8/22 6/8/22 6/8/22 6/8/22 9/23/22 9/23/22 9/23/22 9/23/22 10/14/22    0930 0930 0930 0930 1300 1300 1300 1300    Glucose   86    88  85   BUN   13    12  14   Creatinine   1.04 (A)    0.80  0.93   Sodium   141    136  142   Potassium   4.3    4.9  4.9   Chloride   100    96  104   Calcium   10.2    9.5  9.8   Total Protein   7.9    7.6     Albumin   4.9    4.7     Total Bilirubin   0.5    0.3     Alkaline Phosphatase   119    136 (A)     AST (SGOT)   60 (A)    56 (A)     ALT (SGPT)   47 (A)    30     WBC  6.0    4.6      Hemoglobin  11.9    10.3 (A)      Hematocrit  39.5    33.3 (A)      Platelets  414    370      Total Cholesterol    270 (A)    227 (A)    Triglycerides    87    191 (A)    HDL Cholesterol    129    114    LDL Cholesterol     127 (A)    82    Hemoglobin A1C 5.4    5.6       (A) Abnormal value       Comments are available for  some flowsheets but are not being displayed.                               Assessment and Plan    Diagnoses and all orders for this visit:    1. Elevated liver enzymes (Primary)  -     Hepatitis Panel, Acute  -     Iron  -     Mitochondrial Antibodies, M2  -     Protime-INR  -     Anti-Smooth Muscle Antibody Titer  -     Hemochromatosis Mutation  -     KP  -     IgG  -     US Liver; Future      · Liver serological work-up.  · Obtain hepatic ultrasound.  · She will due for colonoscopy in May 2023.  · Discussed cutting back on nightly wine intake but patient states that is non-negotiable.   · Follow-up in 3 months, sooner if necessary.      Follow Up   Return in about 3 months (around 2/4/2023) for Dr. Malik.    Ingrid dictation used throughout this note.     EVI Stokes

## 2022-11-04 NOTE — PROGRESS NOTES
Please let patient know there is a little bit of plaque in one of her renal arteries but nothing that is blocking flow.  Let her know that most recent labs show no ill effects from the medication changes we made.  Please ask her to send a twice daily blood pressure log in for either the last week or the upcoming week.

## 2022-11-05 LAB
ANA SER QL: NEGATIVE
MITOCHONDRIA M2 IGG SER-ACNC: <20 UNITS (ref 0–20)
SMA IGG SER-ACNC: 25 UNITS (ref 0–19)

## 2022-11-09 LAB — HFE GENE MUT ANL BLD/T: NORMAL

## 2022-11-11 ENCOUNTER — TELEPHONE (OUTPATIENT)
Dept: GASTROENTEROLOGY | Facility: CLINIC | Age: 57
End: 2022-11-11

## 2022-11-11 DIAGNOSIS — R74.8 ELEVATED LIVER ENZYMES: Primary | ICD-10-CM

## 2022-11-11 NOTE — TELEPHONE ENCOUNTER
Please contact Alecia and let her know I have reviewed her labs.  There is a mild elevation in the smooth muscle antibody. I would like to have her repeat the testing at her earliest convenience.  The order has been placed.

## 2022-11-14 NOTE — TELEPHONE ENCOUNTER
Call to pt.  Advise per Alina Cortes note.  Verb understanding.     States will complete labs at [facility] outpt lab with US on 12/2.

## 2022-12-02 ENCOUNTER — HOSPITAL ENCOUNTER (OUTPATIENT)
Dept: ULTRASOUND IMAGING | Facility: HOSPITAL | Age: 57
Discharge: HOME | End: 2022-12-02
Admitting: PHYSICIAN ASSISTANT

## 2022-12-02 DIAGNOSIS — R74.8 ELEVATED LIVER ENZYMES: ICD-10-CM

## 2022-12-02 PROCEDURE — 76705 ECHO EXAM OF ABDOMEN: CPT

## 2022-12-08 NOTE — PROGRESS NOTES
Please inform Alecia that her liver imaging has been reviewed and she has fatty changes in the liver. Recommend decreasing alcohol consumption, weight loss, aggressive control of blood sugar and lipids/cholesterol to reduce fat in the liver.

## 2022-12-12 ENCOUNTER — TELEPHONE (OUTPATIENT)
Dept: GASTROENTEROLOGY | Facility: CLINIC | Age: 57
End: 2022-12-12

## 2022-12-12 NOTE — TELEPHONE ENCOUNTER
----- Message from EVI Stokes sent at 12/8/2022  7:51 AM EST -----  Please inform Alecia that her liver imaging has been reviewed and she has fatty changes in the liver. Recommend decreasing alcohol consumption, weight loss, aggressive control of blood sugar and lipids/cholesterol to reduce fat in the liver.

## 2023-04-06 ENCOUNTER — PRE-PROCEDURE SCREENING (OUTPATIENT)
Dept: GASTROENTEROLOGY | Facility: CLINIC | Age: 58
End: 2023-04-06
Payer: COMMERCIAL

## 2023-04-06 NOTE — TELEPHONE ENCOUNTER
LAST SCOPE 5/18 IN Epic --Personal history of polyps--Family history of polyps AND  colon cancer--No blood thinners--Medications:              METOPROLOL  LEVOTHYROXINE  LOSARTAN  LOSARTAN  DESVENLAXINE  B12          QUESTIONNAIRE SCREENING  SCAN IN  MEDIA & HAS BEEN SENT TO DOCTOR FOR REVIEW

## 2023-04-12 ENCOUNTER — PREP FOR SURGERY (OUTPATIENT)
Dept: OTHER | Facility: HOSPITAL | Age: 58
End: 2023-04-12
Payer: COMMERCIAL

## 2023-04-12 DIAGNOSIS — Z80.0 FAMILY HISTORY OF COLON CANCER: ICD-10-CM

## 2023-04-12 DIAGNOSIS — Z86.010 HISTORY OF COLON POLYPS: Primary | ICD-10-CM

## 2023-04-14 ENCOUNTER — TELEPHONE (OUTPATIENT)
Dept: GASTROENTEROLOGY | Facility: CLINIC | Age: 58
End: 2023-04-14
Payer: COMMERCIAL

## 2023-04-14 NOTE — TELEPHONE ENCOUNTER
SW PATIENT  for colonoscopy on  07/21/2023 arrive at [facility]   . Mailed Prep instructions to Mailing address on-file. ----[employer]

## 2023-07-17 ENCOUNTER — TELEPHONE (OUTPATIENT)
Dept: GASTROENTEROLOGY | Facility: CLINIC | Age: 58
End: 2023-07-17

## 2023-07-17 NOTE — TELEPHONE ENCOUNTER
Caller: Alecia Patel    Relationship to patient: Self    Best call back number: [phone]    Patient is needing: [facility] CALLED AND STATED THEY CAN NOT PERFORM THE PROCEDURE THERE AND IT WOULD NEED TO BE RESCHEDULED FOR THE HOSPITAL.  PLEASE ASSIST.  THANKS.

## 2023-07-18 PROBLEM — Z86.0100 HISTORY OF COLON POLYPS: Status: ACTIVE | Noted: 2023-07-18

## 2023-07-18 PROBLEM — Z86.010 HISTORY OF COLON POLYPS: Status: ACTIVE | Noted: 2023-07-18

## 2023-07-18 PROBLEM — Z80.0 FAMILY HISTORY OF COLON CANCER: Status: ACTIVE | Noted: 2023-07-18

## 2023-07-19 ENCOUNTER — TELEPHONE (OUTPATIENT)
Dept: GASTROENTEROLOGY | Facility: CLINIC | Age: 58
End: 2023-07-19

## 2023-07-19 NOTE — TELEPHONE ENCOUNTER
Caller: Alecia Patel    Relationship to patient: Self    Best call back number: [phone]     Chief complaint: PATIENT CAN'T MAKE A THURSDAY APPT     Type of visit: PROCEDURE    Requested date: STATES APPT HAS TO BE ON A FRIDAY     If rescheduling, when is the original appointment: 7/26/23    Additional notes: PLEASE CALL BACK TO ADVISE WITH SCHEDULING.

## 2023-07-21 ENCOUNTER — OUTSIDE FACILITY SERVICE (OUTPATIENT)
Dept: GASTROENTEROLOGY | Facility: CLINIC | Age: 58
End: 2023-07-21
Payer: COMMERCIAL

## 2023-08-18 ENCOUNTER — TELEPHONE (OUTPATIENT)
Dept: URGENT CARE | Facility: CLINIC | Age: 58
End: 2023-08-18
Payer: COMMERCIAL

## 2023-08-18 NOTE — TELEPHONE ENCOUNTER
Pt called at approximately 1515 today. She stated that her pharmacy did not have cefdinir. Dr Tinsley changed medication to Ceftin 500mg BID #20. Porsche called RX into [facility]. She notified pt that a new RX had been sent.

## 2023-09-05 ENCOUNTER — TELEPHONE (OUTPATIENT)
Dept: GASTROENTEROLOGY | Facility: CLINIC | Age: 58
End: 2023-09-05

## 2023-09-05 NOTE — TELEPHONE ENCOUNTER
PT CALLED AND SHE HASN'T RECEIVED HER PAPERWORK FOR HER PROCEDURE SCHEDULED FOR 9/13.  SHE WOULD LIKE A CALL BACK.

## 2023-09-12 RX ORDER — MULTIPLE VITAMINS W/ MINERALS TAB 9MG-400MCG
1 TAB ORAL DAILY
COMMUNITY

## 2023-09-12 RX ORDER — MELATONIN
2000 DAILY
COMMUNITY

## 2023-09-12 RX ORDER — VIT C/VIT D3/E/ZINC/ELDERBERRY 65 MG-3.15
TABLET,CHEWABLE ORAL
COMMUNITY

## 2023-09-13 ENCOUNTER — ANESTHESIA EVENT (OUTPATIENT)
Dept: GASTROENTEROLOGY | Facility: HOSPITAL | Age: 58
End: 2023-09-13
Payer: COMMERCIAL

## 2023-09-13 ENCOUNTER — HOSPITAL ENCOUNTER (OUTPATIENT)
Facility: HOSPITAL | Age: 58
Setting detail: HOSPITAL OUTPATIENT SURGERY
Discharge: HOME | End: 2023-09-13
Attending: INTERNAL MEDICINE | Admitting: INTERNAL MEDICINE
Payer: COMMERCIAL

## 2023-09-13 ENCOUNTER — ANESTHESIA (OUTPATIENT)
Dept: GASTROENTEROLOGY | Facility: HOSPITAL | Age: 58
End: 2023-09-13
Payer: COMMERCIAL

## 2023-09-13 VITALS
HEIGHT: 66 IN | HEART RATE: 70 BPM | BODY MASS INDEX: 31.34 KG/M2 | WEIGHT: 195 LBS | RESPIRATION RATE: 22 BRPM | DIASTOLIC BLOOD PRESSURE: 82 MMHG | SYSTOLIC BLOOD PRESSURE: 132 MMHG | OXYGEN SATURATION: 97 %

## 2023-09-13 DIAGNOSIS — Z86.010 HISTORY OF COLON POLYPS: ICD-10-CM

## 2023-09-13 DIAGNOSIS — Z80.0 FAMILY HISTORY OF COLON CANCER: ICD-10-CM

## 2023-09-13 PROCEDURE — 45385 COLONOSCOPY W/LESION REMOVAL: CPT | Performed by: INTERNAL MEDICINE

## 2023-09-13 PROCEDURE — 45380 COLONOSCOPY AND BIOPSY: CPT | Performed by: INTERNAL MEDICINE

## 2023-09-13 PROCEDURE — S0260 H&P FOR SURGERY: HCPCS | Performed by: INTERNAL MEDICINE

## 2023-09-13 PROCEDURE — 25010000002 PROPOFOL 10 MG/ML EMULSION: Performed by: ANESTHESIOLOGY

## 2023-09-13 PROCEDURE — 88305 TISSUE EXAM BY PATHOLOGIST: CPT | Performed by: INTERNAL MEDICINE

## 2023-09-13 RX ORDER — PROMETHAZINE HYDROCHLORIDE 25 MG/1
25 TABLET ORAL ONCE AS NEEDED
Status: DISCONTINUED | OUTPATIENT
Start: 2023-09-13 | End: 2023-09-13 | Stop reason: HOSPADM

## 2023-09-13 RX ORDER — PROMETHAZINE HYDROCHLORIDE 25 MG/1
25 SUPPOSITORY RECTAL ONCE AS NEEDED
Status: DISCONTINUED | OUTPATIENT
Start: 2023-09-13 | End: 2023-09-13 | Stop reason: HOSPADM

## 2023-09-13 RX ORDER — SODIUM CHLORIDE, SODIUM LACTATE, POTASSIUM CHLORIDE, CALCIUM CHLORIDE 600; 310; 30; 20 MG/100ML; MG/100ML; MG/100ML; MG/100ML
30 INJECTION, SOLUTION INTRAVENOUS CONTINUOUS PRN
Status: DISCONTINUED | OUTPATIENT
Start: 2023-09-13 | End: 2023-09-13 | Stop reason: HOSPADM

## 2023-09-13 RX ORDER — PROPOFOL 10 MG/ML
VIAL (ML) INTRAVENOUS CONTINUOUS PRN
Status: DISCONTINUED | OUTPATIENT
Start: 2023-09-13 | End: 2023-09-13 | Stop reason: SURG

## 2023-09-13 RX ORDER — LIDOCAINE HYDROCHLORIDE 20 MG/ML
INJECTION, SOLUTION INFILTRATION; PERINEURAL AS NEEDED
Status: DISCONTINUED | OUTPATIENT
Start: 2023-09-13 | End: 2023-09-13 | Stop reason: SURG

## 2023-09-13 RX ADMIN — LIDOCAINE HYDROCHLORIDE 60 MG: 20 INJECTION, SOLUTION INFILTRATION; PERINEURAL at 10:17

## 2023-09-13 RX ADMIN — PROPOFOL 200 MCG/KG/MIN: 10 INJECTION, EMULSION INTRAVENOUS at 10:17

## 2023-09-13 RX ADMIN — SODIUM CHLORIDE, POTASSIUM CHLORIDE, SODIUM LACTATE AND CALCIUM CHLORIDE 30 ML/HR: 600; 310; 30; 20 INJECTION, SOLUTION INTRAVENOUS at 10:00

## 2023-09-13 NOTE — ANESTHESIA PREPROCEDURE EVALUATION
Anesthesia Evaluation     NPO Solid Status: > 8 hours             Airway   Mallampati: I  TM distance: >3 FB  Neck ROM: full  Dental - normal exam     Pulmonary - normal exam   Cardiovascular - normal exam    (+) hypertension, hyperlipidemia      Neuro/Psych  (+) psychiatric history Anxiety and Depression  GI/Hepatic/Renal/Endo    (+) thyroid problem hypothyroidism    Musculoskeletal     Abdominal    Substance History      OB/GYN          Other                      Anesthesia Plan    ASA 2     MAC       Anesthetic plan, risks, benefits, and alternatives have been provided, discussed and informed consent has been obtained with: patient.    CODE STATUS:

## 2023-09-13 NOTE — DISCHARGE INSTRUCTIONS
For the next 24 hours patient needs to be with a responsible adult.    For 24 hours DO NOT drive, operate machinery, appliances, drink alcohol, make important decisions or sign legal documents.    Start with a light or bland diet if you are feeling sick to your stomach otherwise advance to regular diet as tolerated.    Follow recommendations on procedure report if provided by your doctor.    Call Dr Malik for problems [phone].  Office will call biopsy results in 7-14 days.    Problems may include but not limited to: large amounts of bleeding, trouble breathing, repeated vomiting, severe unrelieved pain, fever or chills.

## 2023-09-13 NOTE — H&P
[facility]  Pre Procedure History & Physical    Chief Complaint:   Screening - FH CRC    Subjective     HPI:   58 yo here today for colonoscopy.  Pt reports + FH CRC.  Patient denies GI symptoms currently.  Last exam 2018- benign lymphoid aggregate noted omn biopsy    Past Medical History:   Past Medical History:   Diagnosis Date    Constipation 2016    Depression 2016    Disease of thyroid gland     History of colon polyps     History of transfusion     Hypertension     Preop examination 2021    Walking pneumonia     Weight gain 2016       Past Surgical History:  Past Surgical History:   Procedure Laterality Date    AUGMENTATION MAMMAPLASTY Bilateral     ssaline     SECTION      COLONOSCOPY  2016    LEC    GASTRIC BYPASS      HERNIA REPAIR      OOPHORECTOMY Left     REDUCTION MAMMAPLASTY Bilateral        Family History:  Family History   Problem Relation Age of Onset    COPD Mother     Cancer Father         colon cancer    Colon cancer Father     Heart attack Father 62    Malig Hyperthermia Neg Hx        Social History:   reports that she has never smoked. She has never used smokeless tobacco. She reports current alcohol use. She reports that she does not use drugs.    Medications:   Medications Prior to Admission   Medication Sig Dispense Refill Last Dose    busPIRone (BUSPAR) 5 MG tablet Take 2 tablets by mouth 3 (Three) Times a Day. 180 tablet 3 Past Week    cholecalciferol (VITAMIN D3) 25 MCG (1000 UT) tablet Take 2 tablets by mouth Daily.   2023    cyanocobalamin 1000 MCG/ML injection Inject 1 mL into the appropriate muscle as directed by prescriber 2 (Two) Times a Week. (Patient taking differently: Inject 1 mL into the appropriate muscle as directed by prescriber 1 (One) Time.) 75 mL 1 Past Week    ferrous gluconate (FERGON) 324 MG tablet TAKE 1 TABLET BY MOUTH EVERY OTHER DAY 45 tablet 3 Past Week    levothyroxine (SYNTHROID, LEVOTHROID)  "25 MCG tablet TAKE 1 TABLET BY MOUTH DAILY 90 tablet 1 9/13/2023    losartan (Cozaar) 100 MG tablet Take 1 tablet by mouth Daily. (Patient taking differently: Take 1.5 tablets by mouth Daily.) 30 tablet 5 9/12/2023    losartan (COZAAR) 50 MG tablet Take 1 tablet by mouth Daily.       Magnesium 400 MG capsule Take 800 mg by mouth Daily.   9/12/2023    metoprolol succinate XL (TOPROL-XL) 50 MG 24 hr tablet Take 1.5 tablets by mouth 2 (Two) Times a Day. (Patient taking differently: Take 2 tablets by mouth Daily. Pt reports taking 125mg twice daily) 270 tablet 2 9/13/2023    Misc Natural Products (Airborne Elderberry) chewable tablet Chew.   Past Week    multivitamin with minerals tablet tablet Take 1 tablet by mouth Daily.   9/12/2023    Syringe/Needle, Disp, 23G X 1\" 3 ML misc Use to inject vitamin b12 weekly 100 each 3     valACYclovir (Valtrex) 500 MG tablet Take 1 tablet by mouth Daily. (Patient taking differently: Take 1 tablet by mouth Daily. PRN) 90 tablet 3 Past Month    atorvastatin (LIPITOR) 20 MG tablet TAKE 1 TABLET BY MOUTH EVERYDAY AT BEDTIME 30 tablet 2     busPIRone (BUSPAR) 10 MG tablet As Needed. Take two tablets by mouth 3 (Three) times daily       Docusate Sodium (COLACE PO) Take  by mouth.   Unknown    NON FORMULARY 2 (Two) Times a Day. Bio complete          Allergies:  Patient has no known allergies.    ROS:    Pertinent items are noted in HPI     Objective     Blood pressure 131/70, pulse 67, resp. rate 16, height 167.6 cm (66\"), weight 88.5 kg (195 lb), SpO2 98 %.    Physical Exam   Constitutional: Pt is oriented to person, place, and time and well-developed, well-nourished, and in no distress.   Mouth/Throat: Oropharynx is clear and moist.   Neck: Normal range of motion.   Cardiovascular: Normal rate, regular rhythm    Pulmonary/Chest: Effort normal    Abdominal: Soft. Nontender  Skin: Skin is warm and dry.   Psychiatric: Mood, memory, affect and judgment normal.     Assessment & Plan "     Diagnosis:  Screening-FH CRC    Anticipated Surgical Procedure:  colonoscopy    The risks, benefits, and alternatives of this procedure have been discussed with the patient or the responsible party- the patient understands and agrees to proceed.

## 2023-09-13 NOTE — ANESTHESIA POSTPROCEDURE EVALUATION
Patient: Alecia Hunter    Procedure Summary       Date: 09/13/23 Room / Location: [facility] ENDOSCOPY 10 /  [facility]    Anesthesia Start: 1014 Anesthesia Stop: 1045    Procedure: COLONOSCOPY INTO TERMINAL ILEUM WITH POLYPECTOMY (COLD SNARE)and  (HOT) Diagnosis:       History of colon polyps      Family history of colon cancer      (History of colon polyps [Z86.010])      (Family history of colon cancer [Z80.0])    Surgeons: Reva Malik MD Provider: Greg Main MD    Anesthesia Type: MAC ASA Status: 2            Anesthesia Type: MAC    Vitals  Vitals Value Taken Time   /72 09/13/23 1052   Temp     Pulse 66 09/13/23 1052   Resp 18 09/13/23 1052   SpO2 97 % 09/13/23 1052           Post Anesthesia Care and Evaluation    Patient location during evaluation: bedside  Pain management: adequate    Airway patency: patent  Anesthetic complications: No anesthetic complications    Cardiovascular status: acceptable  Respiratory status: acceptable  Hydration status: acceptable

## 2023-09-14 LAB
LAB AP CASE REPORT: NORMAL
PATH REPORT.FINAL DX SPEC: NORMAL
PATH REPORT.GROSS SPEC: NORMAL

## 2023-09-15 ENCOUNTER — TELEPHONE (OUTPATIENT)
Dept: GASTROENTEROLOGY | Facility: CLINIC | Age: 58
End: 2023-09-15
Payer: COMMERCIAL

## 2023-09-15 NOTE — TELEPHONE ENCOUNTER
----- Message from Reva Malik MD sent at 9/15/2023  3:03 PM EDT -----  2 of The polyp(s) biopsies showed adenomatous change. This is not cancerous but is considered potentially precancerous. Follow-up colonoscopy in 5 years is advised.

## 2023-10-10 ENCOUNTER — TELEPHONE (OUTPATIENT)
Dept: CARDIOLOGY | Facility: CLINIC | Age: 58
End: 2023-10-10

## 2023-10-10 NOTE — TELEPHONE ENCOUNTER
Caller: Alecia Patel     Relationship: SELF    Best call back number: [phone]    What is your medical concern? PATIENT STATES SHE HAS A RACING HEART BEFORE PATIENT GETS OUT OF BEDS THAT IS  BECOMING MORE FREQUENT. PATIENT HAS BEEN REALLY TIRED AS WELL AS SHORT WINDED DOING ANYTHING PHYSICAL    How long has this issue been going on? UNKNOWN    Is your provider already aware of this issue? NO    Have you been treated for this issue? NO

## 2023-10-10 NOTE — TELEPHONE ENCOUNTER
Patient hasn't been seen since 9/30/22 and was told to come back in 6 months. I have scheduled her the soonest appointment to see Tina Del Valle.     Bea Woodson RN  Triage [facility]

## 2023-10-17 ENCOUNTER — OFFICE VISIT (OUTPATIENT)
Dept: CARDIOLOGY | Facility: CLINIC | Age: 58
End: 2023-10-17
Payer: COMMERCIAL

## 2023-10-17 VITALS
HEART RATE: 71 BPM | BODY MASS INDEX: 30.37 KG/M2 | DIASTOLIC BLOOD PRESSURE: 90 MMHG | HEIGHT: 66 IN | SYSTOLIC BLOOD PRESSURE: 140 MMHG | WEIGHT: 189 LBS

## 2023-10-17 DIAGNOSIS — I15.0 RENOVASCULAR HYPERTENSION: ICD-10-CM

## 2023-10-17 DIAGNOSIS — R00.2 PALPITATIONS: Primary | ICD-10-CM

## 2023-10-17 DIAGNOSIS — R00.0 TACHYCARDIA: ICD-10-CM

## 2023-10-17 PROCEDURE — 93000 ELECTROCARDIOGRAM COMPLETE: CPT | Performed by: NURSE PRACTITIONER

## 2023-10-17 PROCEDURE — 99214 OFFICE O/P EST MOD 30 MIN: CPT | Performed by: NURSE PRACTITIONER

## 2023-10-17 RX ORDER — METOPROLOL SUCCINATE 100 MG/1
100 TABLET, EXTENDED RELEASE ORAL DAILY
COMMUNITY

## 2023-10-17 RX ORDER — LOSARTAN POTASSIUM 50 MG/1
TABLET ORAL
COMMUNITY
End: 2023-10-17

## 2023-10-17 RX ORDER — ATORVASTATIN CALCIUM 20 MG/1
20 TABLET, FILM COATED ORAL
Qty: 30 TABLET | Refills: 5 | Status: SHIPPED | OUTPATIENT
Start: 2023-10-17

## 2023-10-17 NOTE — PROGRESS NOTES
Date of Office Visit: 10/17/23  Encounter Provider: ASHLEY Reynoso  Place of Service: [facility] CARDIOLOGY  Patient Name: Alecia Hunter  :1965    Chief Complaint   Patient presents with    Rapid Heart Rate    Hypertension    Hyperlipidemia    Follow-up   :     HPI: Alecia Hunter is a 58 y.o. female  with hypertension, renal artery stenosis, aortic calcification/atherosclerosis, status post gastric bypass surgery,Anxiety, sinus tachycardia, and normal coronary arteries on cardiac catheterization 2022.      She is followed by Dr. Delvin Roman.  I will visit with her for the first time today have reviewed her medical record.    She was evaluated at [facility] in 2022 with chest pain associated with hypertensive crisis.  She had been started on losartan by PCP about a week prior to presenting.  She ultimately had cardiac catheterization which showed completely normal coronary arteries and normal LV systolic function.  She also had an unremarkable echo at that time.  She followed up in our office and had a 48-hour Holter monitor 2022 showing intermittent sinus tachycardia at 6%.  Echocardiogram showed normal left ventricular systolic function and calcified atheroma noted in the proximal ascending aorta    She presents today for reassessment.  She has palpitations in the morning but this is better over the past week since she has decreased caffeine.  She was having issues with palpitations for approximately 3 weeks.  She also has chest pressure when her heart is racing.  She only has shortness of breath when her heart is racing.  Her iWatch tells her her pulse gets up to 130-140 at times.  She reportedly is compliant with CPAP    No Known Allergies        Family and social history reviewed.     ROS  All other systems were reviewed and are negative          Objective:     Vitals:    10/17/23 1140   BP: 140/90   BP Location: Left arm  "  Patient Position: Sitting   Pulse: 71   Weight: 85.7 kg (189 lb)   Height: 167.6 cm (66\")     Body mass index is 30.51 kg/m².    PHYSICAL EXAM:  Pulmonary:      Effort: Pulmonary effort is normal.      Breath sounds: Normal breath sounds.   Cardiovascular:      Normal rate. Regular rhythm.           ECG 12 Lead    Date/Time: 10/17/2023 1:26 PM  Performed by: Tina Del Valle APRN    Authorized by: Tina Del Valle APRN  Comparison: compared with previous ECG   Similar to previous ECG  Rhythm: sinus rhythm  Rate: normal  Comments: No change from prior            Current Outpatient Medications   Medication Sig Dispense Refill    atorvastatin (LIPITOR) 20 MG tablet Take 1 tablet by mouth every night at bedtime. 30 tablet 5    busPIRone (BUSPAR) 5 MG tablet Take 2 tablets by mouth 3 (Three) Times a Day. (Patient taking differently: Take 2 tablets by mouth 3 (Three) Times a Day As Needed.) 180 tablet 3    cholecalciferol (VITAMIN D3) 25 MCG (1000 UT) tablet Take 2 tablets by mouth Daily.      cyanocobalamin 1000 MCG/ML injection Inject 1 mL into the appropriate muscle as directed by prescriber 2 (Two) Times a Week. (Patient taking differently: Inject 1 mL into the appropriate muscle as directed by prescriber 1 (One) Time.) 75 mL 1    ferrous gluconate (FERGON) 324 MG tablet TAKE 1 TABLET BY MOUTH EVERY OTHER DAY 45 tablet 3    levothyroxine (SYNTHROID, LEVOTHROID) 25 MCG tablet TAKE 1 TABLET BY MOUTH DAILY 90 tablet 1    losartan (Cozaar) 100 MG tablet Take 1 tablet by mouth Daily. (Patient taking differently: Take one tablet by mouth daily along with a 50 mg tablet of Losartan) 30 tablet 5    Magnesium 400 MG capsule Take 800 mg by mouth Daily.      metoprolol succinate XL (TOPROL-XL) 100 MG 24 hr tablet Take 1 tablet by mouth Daily.      Misc Natural Products (Airborne Elderberry) chewable tablet Chew.      Syringe/Needle, Disp, 23G X 1\" 3 ML misc Use to inject vitamin b12 weekly 100 each 3    valACYclovir (Valtrex) 500 " MG tablet Take 1 tablet by mouth Daily. (Patient taking differently: Take 1 tablet by mouth Daily. PRN) 90 tablet 3    NON FORMULARY 2 (Two) Times a Day. Bio complete       No current facility-administered medications for this visit.     Assessment:       Diagnosis Plan   1. Palpitations  Mobile Cardiac Outpatient Telemetry      2. Tachycardia  Mobile Cardiac Outpatient Telemetry           Orders Placed This Encounter   Procedures    Mobile Cardiac Outpatient Telemetry     7 days     Standing Status:   Future     Standing Expiration Date:   10/16/2024     Order Specific Question:   Reason for exam?     Answer:   Palpitations     Order Specific Question:   Release to patient     Answer:   Routine Release [[id number]]         Plan:   1.  58-year-old female with renovascular hypertension.  She reportedly is taking losartan 150 mg daily.  She takes 100 mg losartan in the morning and a 50 mg tablet in the evening.  She had blood work today with her endocrinologist but that is not yet back or visible to me.  -We discussed that her blood pressure does not appear controlled with current regimen.  Once we make medication adjustments after her monitor, it would be beneficial to have home blood pressure log which was communicated with patient.  She will follow-up in 1 month with me  2.  Sinus tachycardia-this was 6% of Holter monitor July 2022.  She would feel more comfortable to have a repeat Holter monitor.  We discussed decreasing caffeine in her diet.  Caffeine seems to be a trigger for increased palpitations.  She is on metoprolol succinate 100 mg daily.  She will have a 7-day external mobile telemetry when she returns from her upcoming trip.  After her monitor result, we will adjust metoprolol succinate but I did not make any changes today  3.  Left renal artery stenosis she has mild to moderate left renal artery stenoses less than 60% on duplex November 2022  4.  Hyperlipidemia-I have advised that she resume  atorvastatin due to renal artery stenosis and calcified atheroma in the aorta.  I sent atorvastatin 20 mg for her to take nightly.  Target LDL 70 or less  5.Calcified atheroma in the proximal ascending aorta noted on echo July 2022  6.  Obesity with history of gastric bypass surgery-BMI 30.51  7.  Anxiety  8.  For thyroidism on replacement therapy.  Her endocrinologist has advised for her to take levothyroxine at night reportedly.  She is following with Dr. Berger and had blood work drawn today  9.  Normal coronary arteries on cardiac catheterization June 2022 at [facility].  This data was obtained through care everywhere today  10.  Obstructive sleep apnea-she reports compliance with CPAP      Follow-up with me in 1 month          It has been a pleasure to participate in this patient's care.      Thank you,  ASHLEY Reynoso      **I used Dragon to dictate this note:**

## 2023-10-18 ENCOUNTER — TELEPHONE (OUTPATIENT)
Dept: CARDIOLOGY | Facility: CLINIC | Age: 58
End: 2023-10-18

## 2023-10-18 NOTE — TELEPHONE ENCOUNTER
Caller: Alecia Patel    Relationship to patient: Self    Best call back number:     [phone]           Type of visit: SCHEDULE  HEART MONITOR ON 10/31/23                        FOLLOW UP WITH APRN 11/4/23    If rescheduling, when is the original appointment:  LAST SEEN 10/17/23    Additional notes:PATIENT WAS TOLD THAT SHE WOULD GET A CALL BACK FROM CELINA.

## 2023-10-31 ENCOUNTER — TELEPHONE (OUTPATIENT)
Dept: CARDIOLOGY | Facility: CLINIC | Age: 58
End: 2023-10-31

## 2023-10-31 NOTE — TELEPHONE ENCOUNTER
Caller: Alecia Patel     Best call back number: [phone]     What is your medical concern? PT WENT TO ENDOCRINOLOGIST AND WHILE REVIEWING MEDICATION PT WAS TOLD THAT THE 150MG OF BP MEDICATION BETWEEN THE METOPROLOL AND LOSARTAN MAY BE TOO MUCH AND TO REACH OUT TO CARDIOLOGIST TO CONFIRM ABOUT DOSAGE -

## 2023-11-01 NOTE — TELEPHONE ENCOUNTER
Called and spoke with pt. She is taking Metoprolol 100 mg QD and Losartan 100 mg QD. Her Endocrinologist stated that she may be taking too much blood pressure medicine. She did not mention if she was having any problems. Please advise.

## 2023-11-02 ENCOUNTER — TELEPHONE (OUTPATIENT)
Dept: CARDIOLOGY | Facility: CLINIC | Age: 58
End: 2023-11-02
Payer: COMMERCIAL

## 2023-11-02 NOTE — TELEPHONE ENCOUNTER
Low blood pressure at endocrinology office but she has not been checking it at home.  I have asked her to hold her losartan tomorrow and check blood pressure twice daily until Monday.  If her blood pressure is trending above 120 tomorrow she will start taking 50 mg of losartan twice daily until Monday and then call me with the blood pressure log.

## 2023-11-02 NOTE — TELEPHONE ENCOUNTER
Called and spoke with pt. She stated that her B/P was 100/67 while she was at her endocrinologist. She is dizzy when she moves too fast or while bending over. She stated that the dizziness seems to be getting worse. She is scheduled to see Tina 11/14/23. Should we move her apt up?

## 2023-11-06 ENCOUNTER — TELEPHONE (OUTPATIENT)
Dept: CARDIOLOGY | Facility: CLINIC | Age: 58
End: 2023-11-06
Payer: COMMERCIAL

## 2023-11-06 NOTE — TELEPHONE ENCOUNTER
I called and s/w pt. She states she s/w her Endocrinologist today regarding the medication.  Pt is going to take Metoprolol 100 mg in the morning and Losartan 50 mg twice daily.  Pt's BP readings:  Saturday AM(11/4) before medication 159/94, HR-103, took medication(Metoprolol) 15 minutes later, 135/93, HR- 102.  Saturday night 162/89, HR-66. Sunday 10AM(11/5), 180/97, HR-86, 5 minutes later 148/92, HR-83.  After taking medication 12 pm, 133/74.  Pt states her Endocrinologist also put in lab orders.  Thanks/tesfaye

## 2023-11-06 NOTE — TELEPHONE ENCOUNTER
Caller: Alecia Patel A    Relationship to patient: Self    Best call back number: [phone]    Patient is needing: PATIENT STATED THAT SHE HAD A MISS COMMUNICATION WITH HER ENDOCRINOLOGIST ABOUT MEDICATION. PATIENT STATED THAT SHE NEEDS DR. CHONG TO CALL HER AND ADVISE HER ON MEDICATIONS AND HOW TO TAKE THEM. PLEASE CONTACT PATIENT.

## 2023-11-08 NOTE — TELEPHONE ENCOUNTER
I called and s/w pt.  She will continue to monitor her blood pressure.  She states she has felt great these past couple of days.  She is now only taking the Losartan 50 mg as an as needed basis.  She discussed this with her Endocrinologist./tesfaye

## 2023-11-08 NOTE — TELEPHONE ENCOUNTER
Ok, thank you. Her BP after medication I laurar. Have her send a log in a week of post- medication values for us to evaluate.

## 2023-11-14 ENCOUNTER — OFFICE VISIT (OUTPATIENT)
Dept: CARDIOLOGY | Facility: CLINIC | Age: 58
End: 2023-11-14
Payer: COMMERCIAL

## 2023-11-14 ENCOUNTER — TELEPHONE (OUTPATIENT)
Dept: CARDIOLOGY | Facility: CLINIC | Age: 58
End: 2023-11-14

## 2023-11-14 VITALS
HEIGHT: 66 IN | WEIGHT: 196.6 LBS | SYSTOLIC BLOOD PRESSURE: 116 MMHG | DIASTOLIC BLOOD PRESSURE: 78 MMHG | HEART RATE: 82 BPM | BODY MASS INDEX: 31.6 KG/M2

## 2023-11-14 DIAGNOSIS — G47.33 OSA (OBSTRUCTIVE SLEEP APNEA): ICD-10-CM

## 2023-11-14 DIAGNOSIS — I15.0 RENOVASCULAR HYPERTENSION: Primary | ICD-10-CM

## 2023-11-14 DIAGNOSIS — R00.0 TACHYCARDIA: ICD-10-CM

## 2023-11-14 DIAGNOSIS — I10 PRIMARY HYPERTENSION: ICD-10-CM

## 2023-11-14 PROCEDURE — 99214 OFFICE O/P EST MOD 30 MIN: CPT | Performed by: NURSE PRACTITIONER

## 2023-11-14 NOTE — PROGRESS NOTES
Date of Office Visit: 23  Encounter Provider: ASHLEY Reynoso  Place of Service: [facility] CARDIOLOGY  Patient Name: Alecia Hunter  :1965    Chief Complaint   Patient presents with    Follow-up    Hypertension   :     HPI: Alecia Hunter is a 58 y.o. female  with  hypertension, renal artery stenosis, aortic calcification/atherosclerosis, obstructive sleep apnea, status post gastric bypass surgery,Anxiety, sinus tachycardia, and normal coronary arteries on cardiac catheterization 2022.        She is followed by Dr. Delvin Roman.  I will visit with her in follow up today have reviewed her medical record.     She was evaluated at [facility] in 2022 with chest pain associated with hypertensive crisis.  She had been started on losartan by PCP about a week prior to presenting.  She ultimately had cardiac catheterization which showed completely normal coronary arteries and normal LV systolic function.  She also had an unremarkable echo at that time.  She followed up in our office and had a 48-hour Holter monitor 2022 showing intermittent sinus tachycardia at 6%.  Echocardiogram showed normal left ventricular systolic function and calcified atheroma noted in the proximal ascending aorta    In 2023 she reported uncontrolled blood pressure and palpitations.  Cardiac event monitor was ordered.  She presents today for reassessment.  She returned her monitor last week but the results are not available today.  She brought a list of her home blood pressure readings showing values 130-150/80-90.  She is no longer having shaking spells now that she cut out caffeine in the morning.  She denies shortness of breath or chest pain.  She is on semaglutide for weight loss.  She is not on atorvastatin and states she previously did not tolerate that.  She is wearing CPAP.  No near-syncope or syncope.          Allergies   Allergen Reactions     "Losartan Other (See Comments)     ZACKARY 10/2023           Family and social history reviewed.     ROS  All other systems were reviewed and are negative          Objective:     Vitals:    11/14/23 1303   BP: 116/78   BP Location: Left arm   Patient Position: Sitting   Cuff Size: Large Adult   Pulse: 82   Weight: 89.2 kg (196 lb 9.6 oz)   Height: 167.6 cm (66\")     Body mass index is 31.73 kg/m².    PHYSICAL EXAM:  Pulmonary:      Effort: Pulmonary effort is normal.   Cardiovascular:      Normal rate.         Procedures      Current Outpatient Medications   Medication Sig Dispense Refill    busPIRone (BUSPAR) 5 MG tablet Take 2 tablets by mouth 3 (Three) Times a Day. (Patient taking differently: Take 2 tablets by mouth 3 (Three) Times a Day As Needed.) 180 tablet 3    cholecalciferol (VITAMIN D3) 25 MCG (1000 UT) tablet Take 2 tablets by mouth Daily.      cyanocobalamin 1000 MCG/ML injection Inject 1 mL into the appropriate muscle as directed by prescriber 2 (Two) Times a Week. (Patient taking differently: Inject 1 mL into the appropriate muscle as directed by prescriber 1 (One) Time.) 75 mL 1    ferrous gluconate (FERGON) 324 MG tablet TAKE 1 TABLET BY MOUTH EVERY OTHER DAY 45 tablet 3    levothyroxine (SYNTHROID, LEVOTHROID) 25 MCG tablet TAKE 1 TABLET BY MOUTH DAILY 90 tablet 1    Magnesium 400 MG capsule Take 800 mg by mouth Daily.      metoprolol succinate XL (TOPROL-XL) 100 MG 24 hr tablet Take 1 tablet by mouth Daily.      Misc Natural Products (Airborne Elderberry) chewable tablet Chew.      Syringe/Needle, Disp, 23G X 1\" 3 ML misc Use to inject vitamin b12 weekly 100 each 3    valACYclovir (Valtrex) 500 MG tablet Take 1 tablet by mouth Daily. (Patient taking differently: Take 1 tablet by mouth Daily. PRN) 90 tablet 3     No current facility-administered medications for this visit.     Assessment:       Diagnosis Plan   1. Renovascular hypertension        2. Primary hypertension        3. Tachycardia        4. " SHABANA (obstructive sleep apnea)             No orders of the defined types were placed in this encounter.        Plan:       1.  58-year-old female with renovascular hypertension.  I am going to stop losartan.  She has been taking losartan 150 mg total in a day but now her renal function has declined.  I advised that she have repeat CMP next week as opposed to this week so her lab next week can reflect no losartan in her system.  -I am starting her on amlodipine 5 mg to replace losartan.  We will consider repeat renal artery duplex in the future.  I have advised 1 month home blood pressure monitoring and further to return in 1 month  2.  Sinus tachycardia-this was 6% of Holter monitor July 2022.  Repeat cardiac event monitor pending but completed last week.  We will consider increasing metoprolol to twice daily doses versus switching to carvedilol 25 mg twice daily at next visit.  At this time, she will continue metoprolol succinate 100 mg daily in the morning  3.  Left renal artery stenosis she has mild to moderate left renal artery stenoses less than 60% on duplex November 2022-plan as above  4.  Hyperlipidemia-she previously did not tolerate atorvastatin.  Target LDL is 70 or less given presence of atherosclerotic plaque.  I will try her on lower intensity statin in the future such as pravastatin  1 5.Calcified atheroma in the proximal ascending aorta noted on echo July 2022  6.  Obesity with history of gastric bypass surgery-BMI 31.73.  Reportedly on semaglutide as well  7.  Anxiety  8.  Hypo-thyroidism on replacement therapy.  Her endocrinologist has advised for her to take levothyroxine at night reportedly.  She is following with Dr. Berger and had blood work drawn today  9.  Normal coronary arteries on cardiac catheterization June 2022 at [facility].  This data was obtained through care everywhere today  10.  Obstructive sleep apnea-she reports compliance with CPAP  11.  Acute kidney injury with  creatinine spiked greater than 2.0 October 17, 2023.  Her creatinine was reviewed from October 31, 2023 and improved.  She has a lab order for repeat CMP and will have that no later than next week  Call questions or concerns            It has been a pleasure to participate in this patient's care.      Thank you,  ASHLEY Reynoso      **I used Dragon to dictate this note:**

## 2023-11-15 RX ORDER — AMLODIPINE BESYLATE 5 MG/1
5 TABLET ORAL DAILY
Qty: 30 TABLET | Refills: 5 | Status: SHIPPED | OUTPATIENT
Start: 2023-11-15

## 2023-11-20 ENCOUNTER — TELEPHONE (OUTPATIENT)
Dept: CARDIOLOGY | Facility: CLINIC | Age: 58
End: 2023-11-20
Payer: COMMERCIAL

## 2023-11-20 NOTE — TELEPHONE ENCOUNTER
Notified patient of results/recommendations. Patient verbalized understanding.    Bea Woodson RN  Triage [facility] [place]

## 2023-11-20 NOTE — TELEPHONE ENCOUNTER
Notified patient of results/recommendations. Patient verbalized understanding. She said she has been waiting for you to review her lab work? She said she is wondering if she is needing to see a nephrologist or if there is something wrong with her thyroid?    Bea Woodson RN  Triage [facility] [place]

## 2023-11-20 NOTE — TELEPHONE ENCOUNTER
Please inform patient her monitor showed no significant rhythm change.  She has rare PACs and rare PVCs.  There is no significant change from her last monitor.

## 2023-11-20 NOTE — TELEPHONE ENCOUNTER
I did not order those labs but have reviewed.  Her thyroid function has been up and down so she should discuss with her PCP as the last thyroid lab was better.  This is also similar with her kidney function however creatinine was a little higher on the last check.  Since her PCP ordered these labs I think her PCP should finalize referring her to nephrology or not.  If her PCP does not refer her then I would be happy to coordinate a consultation with nephrology

## 2023-12-08 RX ORDER — AMLODIPINE BESYLATE 5 MG/1
5 TABLET ORAL DAILY
Qty: 90 TABLET | Refills: 3 | Status: SHIPPED | OUTPATIENT
Start: 2023-12-08 | End: 2023-12-11 | Stop reason: SDUPTHER

## 2023-12-11 RX ORDER — AMLODIPINE BESYLATE 5 MG/1
5 TABLET ORAL DAILY
Qty: 90 TABLET | Refills: 3 | Status: SHIPPED | OUTPATIENT
Start: 2023-12-11

## 2023-12-12 ENCOUNTER — OFFICE VISIT (OUTPATIENT)
Dept: CARDIOLOGY | Facility: CLINIC | Age: 58
End: 2023-12-12
Payer: COMMERCIAL

## 2023-12-12 VITALS
BODY MASS INDEX: 30.73 KG/M2 | HEART RATE: 75 BPM | OXYGEN SATURATION: 95 % | HEIGHT: 66 IN | WEIGHT: 191.2 LBS | DIASTOLIC BLOOD PRESSURE: 86 MMHG | SYSTOLIC BLOOD PRESSURE: 144 MMHG

## 2023-12-12 DIAGNOSIS — I15.0 RENOVASCULAR HYPERTENSION: Primary | ICD-10-CM

## 2023-12-12 DIAGNOSIS — G47.33 OSA (OBSTRUCTIVE SLEEP APNEA): ICD-10-CM

## 2023-12-12 DIAGNOSIS — I10 PRIMARY HYPERTENSION: ICD-10-CM

## 2023-12-12 PROCEDURE — 99214 OFFICE O/P EST MOD 30 MIN: CPT | Performed by: NURSE PRACTITIONER

## 2023-12-12 NOTE — PROGRESS NOTES
Date of Office Visit: 23  Encounter Provider: ASHLEY Reynoso  Place of Service: [facility] CARDIOLOGY  Patient Name: Alecia Hunter  :1965    Chief Complaint   Patient presents with    Renovascular hypertension    Palpitations    Follow-up   :     HPI: Alecia Hunter is a 58 y.o. female  with hypertension, renal artery stenosis, aortic calcification/atherosclerosis, obstructive sleep apnea, status post gastric bypass surgery,Anxiety, sinus tachycardia, and normal coronary arteries on cardiac catheterization 2022.        She is followed by Dr. Delvin Roman.  I will visit with her in follow up today have reviewed her medical record.     She was evaluated at [facility] in 2022 with chest pain associated with hypertensive crisis.  She had been started on losartan by PCP about a week prior to presenting.  She ultimately had cardiac catheterization which showed completely normal coronary arteries and normal LV systolic function.  She also had an unremarkable echo at that time.  She followed up in our office and had a 48-hour Holter monitor 2022 showing intermittent sinus tachycardia at 6%.  Echocardiogram showed normal left ventricular systolic function and calcified atheroma noted in the proximal ascending aorta     In 2023 she reported uncontrolled blood pressure and palpitations.  She had some vomiting and diarrhea and acute kidney injury.  Losartan was ultimately stopped and she was switched to amlodipine.    She presents today for reassessment and brought a list of her home blood pressure readings showing values anywhere from 117-160/60-80s.  Most values are less than 150/85.  She has less palpitations since being on amlodipine reportedly.  She has swelling if she sits or stands for prolonged period of time.  She denies chest pain or tightness.  She has no shortness of breath.  She is compliant with CPAP.  She is  "planning to get a treadmill for [employer] so she can exercise more at home.  She has noticed some memory issues that she plans to discuss with her PCP next month.  No near-syncope or syncope.  She reportedly feels better overall with amlodipine.              Allergies   Allergen Reactions    Losartan Other (See Comments)     ZACKARY 10/2023           Family and social history reviewed.     ROS  All other systems were reviewed and are negative          Objective:     Vitals:    12/12/23 1319   BP: 144/86   BP Location: Right arm   Patient Position: Sitting   Cuff Size: Adult   Pulse: 75   SpO2: 95%   Weight: 86.7 kg (191 lb 3.2 oz)   Height: 167.6 cm (66\")     Body mass index is 30.86 kg/m².    PHYSICAL EXAM:  Physical Exam    Procedures      Current Outpatient Medications   Medication Sig Dispense Refill    amLODIPine (NORVASC) 5 MG tablet Take 1 tablet by mouth Daily. 90 tablet 3    busPIRone (BUSPAR) 5 MG tablet Take 2 tablets by mouth 3 (Three) Times a Day. (Patient taking differently: Take 2 tablets by mouth 3 (Three) Times a Day As Needed.) 180 tablet 3    cholecalciferol (VITAMIN D3) 25 MCG (1000 UT) tablet Take 2 tablets by mouth Daily.      cyanocobalamin 1000 MCG/ML injection Inject 1 mL into the appropriate muscle as directed by prescriber 2 (Two) Times a Week. (Patient taking differently: Inject 1 mL into the appropriate muscle as directed by prescriber 1 (One) Time.) 75 mL 1    ferrous gluconate (FERGON) 324 MG tablet TAKE 1 TABLET BY MOUTH EVERY OTHER DAY 45 tablet 3    levothyroxine (SYNTHROID, LEVOTHROID) 25 MCG tablet TAKE 1 TABLET BY MOUTH DAILY 90 tablet 1    Magnesium 400 MG capsule Take 800 mg by mouth Daily.      metoprolol succinate XL (TOPROL-XL) 100 MG 24 hr tablet Take 1 tablet by mouth Daily.      Misc Natural Products (Airborne Elderberry) chewable tablet Chew.      Semaglutide-Weight Management 0.5 MG/0.5ML solution auto-injector Inject 0.5 mL under the skin into the appropriate area as " "directed 1 (One) Time Per Week.      Syringe/Needle, Disp, 23G X 1\" 3 ML misc Use to inject vitamin b12 weekly 100 each 3    valACYclovir (Valtrex) 500 MG tablet Take 1 tablet by mouth Daily. (Patient taking differently: Take 1 tablet by mouth Daily. PRN) 90 tablet 3     No current facility-administered medications for this visit.     Assessment:       Diagnosis Plan   1. Renovascular hypertension        2. Primary hypertension        3. SHABANA (obstructive sleep apnea)             No orders of the defined types were placed in this encounter.        Plan:     1.  58-year-old female with renovascular hypertension.  Her blood pressure has improved with amlodipine 5 mg.  She will continue this dose along with metoprolol succinate 100 mg daily.  I will see her back in 2 months to reassess.  She will focus on increased exercise and we discussed target of 30 minutes 4 to 5 days a week.  We also discussed sodium limitation and restriction   2.  Sinus tachycardia-this was 6% of Holter monitor July 2022.  Repeat cardiac event monitor November 2023 showed average heart rate 94 with rare supraventricular ectopy but no SVT   3.  Left renal artery stenosis - less than 60% on duplex November 2022-plan repeat duplex in 2024  4.  Hyperlipidemia-she previously did not tolerate atorvastatin.  Target LDL is 70 or less given presence of atherosclerotic plaque.  Consider trying pravastatin in the future  5.Calcified atheroma in the proximal ascending aorta noted on echo July 2022  6.  Obesity with history of gastric bypass surgery-BMI 31.86 .she is on semaglutide and will plan to start exercising with increased frequency and duration as listed above  7.  Anxiety  8.  Hypo-thyroidism on replacement therapy at night. she is following with Dr. Berger and had blood work drawn today  9.  Normal coronary arteries on cardiac catheterization June 2022 at [facility].    10.  Obstructive sleep apnea-she reports compliance with CPAP  11.  " Acute kidney injury October 2023.  Felt to be related to volume depletion but improving.      Call with questions or concerns.             It has been a pleasure to participate in this patient's care.      Thank you,  ASHLEY Reynoso      **I used Dragon to dictate this note:**

## 2024-01-25 ENCOUNTER — TELEPHONE (OUTPATIENT)
Dept: CARDIOLOGY | Facility: CLINIC | Age: 59
End: 2024-01-25

## 2024-01-25 NOTE — TELEPHONE ENCOUNTER
Caller: Alecia Patel    Relationship: Self    Best call back number: [phone]    What is the best time to reach you: ANY    Who are you requesting to speak with (clinical staff, provider,  specific staff member): ANY      What was the call regarding: PATIENT NEEDS TO MOVE BACK APPT ON 2/6/24 LATER IN THE DAY IF POSSIBLE, SHE HAS A PT APPT AT 11 AND THINKS SHE WILL NOT BE AVAILABLE UNTIL 2PM OR AFTER.

## 2024-02-06 ENCOUNTER — OFFICE VISIT (OUTPATIENT)
Dept: CARDIOLOGY | Facility: CLINIC | Age: 59
End: 2024-02-06
Payer: COMMERCIAL

## 2024-02-06 VITALS
HEIGHT: 66 IN | WEIGHT: 186 LBS | BODY MASS INDEX: 29.89 KG/M2 | OXYGEN SATURATION: 95 % | DIASTOLIC BLOOD PRESSURE: 76 MMHG | SYSTOLIC BLOOD PRESSURE: 130 MMHG | HEART RATE: 73 BPM

## 2024-02-06 DIAGNOSIS — G47.33 OSA (OBSTRUCTIVE SLEEP APNEA): ICD-10-CM

## 2024-02-06 DIAGNOSIS — I70.1 LEFT RENAL ARTERY STENOSIS: ICD-10-CM

## 2024-02-06 DIAGNOSIS — I15.0 RENOVASCULAR HYPERTENSION: Primary | ICD-10-CM

## 2024-02-06 DIAGNOSIS — I10 PRIMARY HYPERTENSION: ICD-10-CM

## 2024-02-06 PROCEDURE — 99214 OFFICE O/P EST MOD 30 MIN: CPT | Performed by: NURSE PRACTITIONER

## 2024-02-06 RX ORDER — ASPIRIN 81 MG/1
81 TABLET ORAL DAILY
Start: 2024-02-06

## 2024-02-06 RX ORDER — AMLODIPINE BESYLATE 5 MG/1
5 TABLET ORAL DAILY
Qty: 90 TABLET | Refills: 3 | Status: SHIPPED | OUTPATIENT
Start: 2024-02-06

## 2024-02-06 RX ORDER — ZOLPIDEM TARTRATE 5 MG/1
5 TABLET ORAL NIGHTLY PRN
COMMUNITY
Start: 2024-01-19

## 2024-02-06 RX ORDER — CYANOCOBALAMIN 1000 UG/ML
1000 INJECTION, SOLUTION INTRAMUSCULAR; SUBCUTANEOUS WEEKLY
Start: 2024-02-06

## 2024-02-06 RX ORDER — METOPROLOL SUCCINATE 100 MG/1
100 TABLET, EXTENDED RELEASE ORAL DAILY
Qty: 90 TABLET | Refills: 3 | Status: SHIPPED | OUTPATIENT
Start: 2024-02-06

## 2024-02-06 NOTE — PROGRESS NOTES
Date of Office Visit: 24  Encounter Provider: ASHLEY Reynoso  Place of Service: [facility] CARDIOLOGY  Patient Name: Alecia Hunter  :1965    Chief Complaint   Patient presents with    Renovascular hypertension    Hyperlipidemia    Follow-up   :     HPI: Alecia Hunter is a 58 y.o. female  with hypertension, renal artery stenosis, aortic calcification/atherosclerosis, obstructive sleep apnea, status post gastric bypass surgery,Anxiety, sinus tachycardia, and normal coronary arteries on cardiac catheterization 2022.        She is followed by Dr. Delvin Roman.  I will visit with her in follow up today have reviewed her medical record.     She was evaluated at [facility] in 2022 with chest pain associated with hypertensive crisis.  She had been started on losartan by PCP about a week prior to presenting.  She ultimately had cardiac catheterization which showed completely normal coronary arteries and normal LV systolic function.  She also had an unremarkable echo at that time.  She followed up in our office and had a 48-hour Holter monitor 2022 showing intermittent sinus tachycardia at 6%.  Echocardiogram showed normal left ventricular systolic function and calcified atheroma noted in the proximal ascending aorta     In 2023 she reported uncontrolled blood pressure and palpitations.  She had some vomiting and diarrhea and acute kidney injury.  Losartan was ultimately stopped and she was switched to amlodipine.  She presents today for reassessment.  Instead of taking amlodipine 5 mg all at once she is splitting the tablet into half dose twice daily and that seems to help improve her swelling.    She has no near-syncope or syncope no chest pain tightness pressure shortness of breath.  She is now walking on treadmill 2-3 times a week.  She got a treadmill for .  She is also in physical therapy twice a week.  She is  "not having any palpitations.  She reports a little anxiety when she feels overwhelmed but she is able to redirect herself with relaxing and deep breathing.  She has been feeling much better.          Allergies   Allergen Reactions    Losartan Other (See Comments)     ZACKARY 10/2023           Family and social history reviewed.     ROS  All other systems were reviewed and are negative          Objective:     Vitals:    02/06/24 1343   BP: 130/76   BP Location: Left arm   Patient Position: Sitting   Pulse: 73   SpO2: 95%   Weight: 84.4 kg (186 lb)   Height: 167.6 cm (66\")     Body mass index is 30.02 kg/m².    PHYSICAL EXAM:  Pulmonary:      Effort: Pulmonary effort is normal.      Breath sounds: Normal breath sounds.   Cardiovascular:      Normal rate. Regular rhythm.         Procedures      Current Outpatient Medications   Medication Sig Dispense Refill    amLODIPine (NORVASC) 5 MG tablet Take 1 tablet by mouth Daily. 90 tablet 3    busPIRone (BUSPAR) 5 MG tablet Take 2 tablets by mouth 3 (Three) Times a Day. (Patient taking differently: Take 2 tablets by mouth 3 (Three) Times a Day As Needed.) 180 tablet 3    cholecalciferol (VITAMIN D3) 25 MCG (1000 UT) tablet Take 2 tablets by mouth Daily.      cyanocobalamin 1000 MCG/ML injection Inject 1 mL into the appropriate muscle as directed by prescriber 1 (One) Time Per Week.      levothyroxine (SYNTHROID, LEVOTHROID) 25 MCG tablet TAKE 1 TABLET BY MOUTH DAILY 90 tablet 1    metoprolol succinate XL (TOPROL-XL) 100 MG 24 hr tablet Take 1 tablet by mouth Daily. 90 tablet 3    Misc Natural Products (Airborne Elderberry) chewable tablet Chew.      Semaglutide-Weight Management 0.5 MG/0.5ML solution auto-injector Inject 0.5 mL under the skin into the appropriate area as directed 1 (One) Time Per Week.      Syringe/Needle, Disp, 23G X 1\" 3 ML misc Use to inject vitamin b12 weekly 100 each 3    valACYclovir (Valtrex) 500 MG tablet Take 1 tablet by mouth Daily. (Patient taking " differently: Take 1 tablet by mouth Daily. PRN) 90 tablet 3    zolpidem (AMBIEN) 5 MG tablet Take 1 tablet by mouth At Night As Needed.      aspirin 81 MG EC tablet Take 1 tablet by mouth Daily.       No current facility-administered medications for this visit.     Assessment:       Diagnosis Plan   1. Renovascular hypertension  Duplex Renal Artery - Bilateral Complete CAR      2. Left renal artery stenosis  Duplex Renal Artery - Bilateral Complete CAR      3. Primary hypertension        4. SHABANA (obstructive sleep apnea)             No orders of the defined types were placed in this encounter.        Plan:        1.  58-year-old female with renovascular hypertension.  Her blood pressure has improved with amlodipine 2.5 mg twice daily along with metoprolol succinate 100 mg daily.  Her blood pressure is stable today.  We discussed continued exercise with further weight loss as a lifestyle modification.   2.  Sinus tachycardia-this was 6% of Holter monitor July 2022.  Repeat cardiac event monitor November 2023 showed average heart rate 94 with rare supraventricular ectopy but no SVT   -With better blood pressure control her palpitations are now a little to none  3.  Left renal artery stenosis - less than 60% on duplex November 2022-placed an order for repeat duplex.  Also encouraged that she start aspirin 81 mg daily.  She is agreeable.  She was advised to monitor for blood in the urine or stool or abdominal pain  4.  Hyperlipidemia-she previously did not tolerate atorvastatin.  Target LDL is 70 or less given presence of atherosclerotic plaque.  Consider trying pravastatin in the future  5.Calcified atheroma in the proximal ascending aorta noted on echo July 2022  6.  Obesity with history of gastric bypass surgery-BMI 30.02 .she is on semaglutide.  She is also increasing physical activity with some structured exercise 2 to 3 days a week with treadmill.  Encourage further weight loss  7.  Anxiety-she has practice  relaxation and deep breathing which is helping  8.  Hypo-thyroidism on replacement therapy at night as advised by Dr. Berger   9.  Normal coronary arteries on cardiac catheterization June 2022 at [facility].    10.  Obstructive sleep apnea-she reports compliance with CPAP  11.  Acute kidney injury October 2023.  Felt to be related to volume depletion but improved    Follow-up in 4 months          It has been a pleasure to participate in this patient's care.      Thank you,  ASHLEY Reynoso      **I used Dragon to dictate this note:**

## 2024-04-02 ENCOUNTER — HOSPITAL ENCOUNTER (OUTPATIENT)
Dept: CARDIOLOGY | Facility: HOSPITAL | Age: 59
Discharge: HOME | End: 2024-04-02
Admitting: NURSE PRACTITIONER
Payer: COMMERCIAL

## 2024-04-02 ENCOUNTER — TELEPHONE (OUTPATIENT)
Dept: CARDIOLOGY | Facility: CLINIC | Age: 59
End: 2024-04-02
Payer: COMMERCIAL

## 2024-04-02 DIAGNOSIS — I70.1 LEFT RENAL ARTERY STENOSIS: ICD-10-CM

## 2024-04-02 DIAGNOSIS — I15.0 RENOVASCULAR HYPERTENSION: ICD-10-CM

## 2024-04-02 LAB
BH CV ECHO MEAS - DIST REN A EDV LEFT: 21.5 CM/S
BH CV ECHO MEAS - DIST REN A PSV LEFT: 56.5 CM/S
BH CV ECHO MEAS - MID REN A EDV LEFT: 20.9 CM/S
BH CV ECHO MEAS - MID REN A PSV LEFT: 59.6 CM/S
BH CV VAS RENAL AORTIC MID PSV: 125 CM/S
BH CV VAS RENAL HILUM LEFT EDV: 8.2 CM/S
BH CV VAS RENAL HILUM LEFT PSV: 28.4 CM/S
BH CV VAS RENAL HILUM RIGHT EDV: 16.3 CM/S
BH CV VAS RENAL HILUM RIGHT PSV: 54.2 CM/S
BH CV XLRA MEAS - KID L LEFT: 10.1 CM
BH CV XLRA MEAS DIST REN A EDV RIGHT: 28.4 CM/S
BH CV XLRA MEAS DIST REN A PSV RIGHT: 103 CM/S
BH CV XLRA MEAS KID L RIGHT: 9.47 CM
BH CV XLRA MEAS KID W RIGHT: 6.15 CM
BH CV XLRA MEAS MID REN A EDV RIGHT: 24.7 CM/S
BH CV XLRA MEAS MID REN A PSV RIGHT: 89 CM/S
BH CV XLRA MEAS PROX REN A EDV RIGHT: 21.4 CM/S
BH CV XLRA MEAS PROX REN A PSV RIGHT: 80.7 CM/S
BH CV XLRA MEAS RAR LEFT: 0.48
BH CV XLRA MEAS RAR RIGHT: 0.82
LEFT KIDNEY WIDTH: 6.51 CM
LEFT RENAL UPPER PARENCHYMA MAX: 37.4 CM/S
LEFT RENAL UPPER PARENCHYMA MIN: 9.89 CM/S
LEFT RENAL UPPER PARENCHYMA RI: 0.74
RIGHT RENAL UPPER PARENCHYMA MAX: 56.3 CM/S
RIGHT RENAL UPPER PARENCHYMA MIN: 18.1 CM/S
RIGHT RENAL UPPER PARENCHYMA RI: 0.68

## 2024-04-02 PROCEDURE — 93975 VASCULAR STUDY: CPT

## 2024-04-02 NOTE — TELEPHONE ENCOUNTER
Notified patient of results and recommendations; patient verbalizes understanding.    [facility] Triage  04/02/24 12:21 EDT

## 2024-04-02 NOTE — TELEPHONE ENCOUNTER
Please inform patient renal artery duplex was normal bilateral on today's study.  Okay to keep June appointment to follow-up with me

## 2024-04-24 ENCOUNTER — TELEPHONE (OUTPATIENT)
Dept: CARDIOLOGY | Facility: CLINIC | Age: 59
End: 2024-04-24
Payer: COMMERCIAL

## 2024-04-24 ENCOUNTER — DOCUMENTATION (OUTPATIENT)
Dept: SLEEP MEDICINE | Facility: HOSPITAL | Age: 59
End: 2024-04-24
Payer: COMMERCIAL

## 2024-04-24 NOTE — TELEPHONE ENCOUNTER
Received order from [facility] for patient's nasal mask nasal cushion, headgear, tubing, filters, humidifier and chinstrap.  I will need this order form faxed to sleep medicine.  Patient previously saw Dr. Alexander Jeronimo and ASHLEY Pabon. I will defer this order to sleep medicine    Sophia- please fax this order to fax # [phone]  Cassandra GARCIA- Please let us know if there is a better fax number for you.

## 2024-04-24 NOTE — PROGRESS NOTES
Tina, thank you for reaching out to us. We will take care of supplies reordering for this patient.    Sophia, we have not seen this patient since 2022. DME needs updated office visit and supplies renewal order. Can you please reach out to the patient and schedule her to see us at the sleep lab? I checked records in pulmonary office. She did not see us there either after 2022.     I am happy to see her next week to review CPAP usage and see if pressures need adjustment. Her sleep study in 2022 showed severe sleep apnea.

## 2024-04-24 NOTE — TELEPHONE ENCOUNTER
Faxed order to Dr. Alexander Jeronimo MD.  Fax# [phone].  Faxed confirmation received./ TEDDY

## 2024-06-21 ENCOUNTER — TELEPHONE (OUTPATIENT)
Dept: SLEEP MEDICINE | Facility: HOSPITAL | Age: 59
End: 2024-06-21
Payer: COMMERCIAL

## 2024-06-21 ENCOUNTER — OFFICE VISIT (OUTPATIENT)
Dept: SLEEP MEDICINE | Facility: HOSPITAL | Age: 59
End: 2024-06-21
Payer: COMMERCIAL

## 2024-06-21 VITALS
SYSTOLIC BLOOD PRESSURE: 145 MMHG | WEIGHT: 178.6 LBS | HEIGHT: 66 IN | BODY MASS INDEX: 28.7 KG/M2 | HEART RATE: 87 BPM | OXYGEN SATURATION: 97 % | DIASTOLIC BLOOD PRESSURE: 80 MMHG

## 2024-06-21 DIAGNOSIS — G47.34 NOCTURNAL HYPOXIA: ICD-10-CM

## 2024-06-21 DIAGNOSIS — G47.30 SEVERE SLEEP APNEA: Primary | ICD-10-CM

## 2024-06-21 PROCEDURE — G0463 HOSPITAL OUTPT CLINIC VISIT: HCPCS

## 2024-07-12 ENCOUNTER — OFFICE VISIT (OUTPATIENT)
Dept: CARDIOLOGY | Facility: CLINIC | Age: 59
End: 2024-07-12
Payer: COMMERCIAL

## 2024-07-12 VITALS
OXYGEN SATURATION: 96 % | WEIGHT: 178 LBS | BODY MASS INDEX: 28.61 KG/M2 | DIASTOLIC BLOOD PRESSURE: 88 MMHG | HEART RATE: 94 BPM | SYSTOLIC BLOOD PRESSURE: 130 MMHG | HEIGHT: 66 IN

## 2024-07-12 DIAGNOSIS — I70.1 LEFT RENAL ARTERY STENOSIS: ICD-10-CM

## 2024-07-12 DIAGNOSIS — I15.0 RENOVASCULAR HYPERTENSION: Primary | ICD-10-CM

## 2024-07-12 DIAGNOSIS — G47.33 OSA (OBSTRUCTIVE SLEEP APNEA): ICD-10-CM

## 2024-07-12 RX ORDER — MELOXICAM 15 MG/1
1 TABLET ORAL DAILY
COMMUNITY
Start: 2024-06-17

## 2024-07-12 RX ORDER — METOPROLOL SUCCINATE 100 MG/1
100 TABLET, EXTENDED RELEASE ORAL DAILY
Qty: 90 TABLET | Refills: 3 | Status: SHIPPED | OUTPATIENT
Start: 2024-07-12

## 2024-07-12 RX ORDER — AMLODIPINE BESYLATE 2.5 MG/1
2.5 TABLET ORAL DAILY
Qty: 90 TABLET | Refills: 3 | Status: SHIPPED | OUTPATIENT
Start: 2024-07-12

## 2024-07-12 NOTE — ASSESSMENT & PLAN NOTE
Blood pressure controlled in clinic today at 130/88  Currently asymptomatic, she is symptomatic with elevated blood pressure readings  Continue the following regimen:  Amlodipine 2.5 mg/day  Metoprolol succinate 100 mg/day

## 2024-07-12 NOTE — PROGRESS NOTES
"    CARDIOLOGY        Patient Name: Alecia Hunter  :1965  Age: 58 y.o.  Primary Cardiologist: Delvin Roman MD  Encounter Provider:  ASHLEY Barber    Date of Service: 24    CHIEF COMPLAINT / REASON FOR OFFICE VISIT     Follow-Up and Hypertension      HISTORY OF PRESENT ILLNESS       HPI  Alecia Hunter is a 58 y.o. female who presents today for semiannual evaluation.     Pt has a  history significant for HTN, renal artery stenosis, aortic calcification, SHABANA, history of gastric bypass, normal coronary arteries on catheterization in 2022..    Patient was evaluated at [facility] in 2022 with chest pain associated with hypertensive crisis.  She ultimately had a cardiac catheterization which revealed normal coronary arteries and normal LV function.  Unremarkable echocardiogram at that time.    In 2023 patient reported uncontrolled blood pressure and palpitations.  Losartan was ultimately discontinued and she was transitioned to amlodipine.    Patient was last evaluated in clinic in 2024, at that time she was stable.  She did have a repeat renal artery duplex which was stable.  Patient reports that she has done well since last assessment.  She is typically symptomatic when her blood pressure elevates and she has not had any symptoms of high blood pressure.  She notes that she is compliant with all medications, she has decreased her amlodipine to 2.5 mg/day.  She is currently asymptomatic and denies chest pain, dyspnea, dyspnea with exertion, palpitations, lightheadedness, edema, fatigue.    The following portions of the patient's history were reviewed and updated as appropriate: allergies, current medications, past family history, past medical history, past social history, past surgical history and problem list.      VITAL SIGNS     Visit Vitals  /88   Pulse 94   Ht 167.6 cm (66\")   Wt 80.7 kg (178 lb)   SpO2 96%   BMI " 28.73 kg/m²         Wt Readings from Last 3 Encounters:   07/12/24 80.7 kg (178 lb)   06/21/24 81 kg (178 lb 9.6 oz)   02/06/24 84.4 kg (186 lb)     Body mass index is 28.73 kg/m².      REVIEW OF SYSTEMS     Review of Systems   Constitutional: Negative for chills, fever, weight gain and weight loss.   Cardiovascular:  Negative for leg swelling.   Respiratory:  Negative for cough, snoring and wheezing.    Hematologic/Lymphatic: Negative for bleeding problem. Does not bruise/bleed easily.   Skin:  Negative for color change.   Musculoskeletal:  Negative for falls, joint pain and myalgias.   Gastrointestinal:  Negative for melena.   Genitourinary:  Negative for hematuria.   Neurological:  Negative for excessive daytime sleepiness.   Psychiatric/Behavioral:  Negative for depression. The patient is not nervous/anxious.            PHYSICAL EXAMINATION     Vitals and nursing note reviewed.   Constitutional:       Appearance: Normal appearance. Well-developed.   Eyes:      Conjunctiva/sclera: Conjunctivae normal.   Neck:      Vascular: No carotid bruit.   Pulmonary:      Breath sounds: Normal breath sounds.   Cardiovascular:      Normal rate. Regular rhythm. Normal S1 with normal intensity. Normal S2 with normal intensity.       Murmurs: There is no murmur.      No gallop.  No click. No rub.   Edema:     Peripheral edema absent.   Musculoskeletal: Normal range of motion. Skin:     General: Skin is warm and dry.   Neurological:      Mental Status: Alert and oriented to person, place, and time.      GCS: GCS eye subscore is 4. GCS verbal subscore is 5. GCS motor subscore is 6.   Psychiatric:         Speech: Speech normal.         Behavior: Behavior normal.         Thought Content: Thought content normal.         Judgment: Judgment normal.           REVIEWED DATA     Procedures    Cardiac Procedures:  Cardiac catheterization at [facility] 8/18/2022.  Normal coronary angiography  48-hour monitor  7/19/2022.  48-hour monitor predominant rhythm was sinus with tachycardia noted 8% of the time.  No atrial fibrillation or flutter  Echocardiogram 7/13/2022.  LVEF 61-65%.  All LV wall segments contract normally.  Calcified atheroma noted in the proximal ascending aorta.  Renal artery duplex 11/4/2022.  Normal right renal artery.  Mild to moderate left renal artery stenosis.  Cardiac event monitor 11/20/2023.  Normal monitor study.        Lab Results   Component Value Date     10/14/2022     09/23/2022    K 4.9 10/14/2022    K 4.9 09/23/2022     10/14/2022    CL 96 09/23/2022    CO2 27.0 10/14/2022    CO2 23 09/23/2022    BUN 14 10/14/2022    BUN 12 09/23/2022    CREATININE 0.93 10/14/2022    CREATININE 0.80 09/23/2022    EGFRIFNONA 87 08/27/2021    EGFRIFNONA 83 04/11/2018    EGFRIFAFRI 105 08/27/2021    EGFRIFAFRI >60 11/06/2020    GLUCOSE 85 10/14/2022    GLUCOSE 88 09/23/2022    CALCIUM 9.8 10/14/2022    CALCIUM 9.5 09/23/2022    PROTENTOTREF 7.6 09/23/2022    PROTENTOTREF 7.9 06/08/2022    ALBUMIN 4.7 09/23/2022    ALBUMIN 4.9 06/08/2022    BILITOT 0.3 09/23/2022    BILITOT 0.5 06/08/2022    AST 56 (H) 09/23/2022    AST 60 (H) 06/08/2022    ALT 30 09/23/2022    ALT 47 (H) 06/08/2022     Lab Results   Component Value Date    WBC 4.6 09/23/2022    WBC 6.0 06/08/2022    HGB 10.3 (L) 09/23/2022    HGB 11.9 06/08/2022    HCT 33.3 (L) 09/23/2022    HCT 39.5 06/08/2022    MCV 82 09/23/2022    MCV 80 06/08/2022     09/23/2022     06/08/2022     Lab Results   Component Value Date    BNP 48 06/12/2022    .0 (H) 11/06/2020     Lab Results   Component Value Date    TROPONINI <0.012 11/06/2020     Lab Results   Component Value Date    TSH 1.430 09/23/2022    TSH 3.330 06/08/2022             ASSESSMENT & PLAN     Diagnoses and all orders for this visit:    1. Renovascular hypertension (Primary)  Assessment & Plan:  Blood pressure controlled in clinic today at 130/88  Currently  asymptomatic, she is symptomatic with elevated blood pressure readings  Continue the following regimen:  Amlodipine 2.5 mg/day  Metoprolol succinate 100 mg/day      2. Left renal artery stenosis  Assessment & Plan:  Mild to moderate on renal artery duplex performed last year      3. SHABANA (obstructive sleep apnea)    Other orders  -     amLODIPine (NORVASC) 2.5 MG tablet; Take 1 tablet by mouth Daily.  Dispense: 90 tablet; Refill: 3  -     metoprolol succinate XL (TOPROL-XL) 100 MG 24 hr tablet; Take 1 tablet by mouth Daily.  Dispense: 90 tablet; Refill: 3        Return in about 1 year (around 7/12/2025) for Dr. Roman.    Future Appointments         Provider Department Center    7/15/2025 12:30 PM Delvin Roman Jr., MD [facility] CARDIOLOGY [place]    8/26/2025 10:15 AM Estevan Olvera MD [facility] SLEEP MEDICINE               MEDICATIONS         Discharge Medications            Accurate as of July 12, 2024  3:20 PM. If you have any questions, ask your nurse or doctor.                Changes to Medications        Instructions Start Date   amLODIPine 2.5 MG tablet  Commonly known as: NORVASC  What changed:   medication strength  how much to take  Changed by: ASHLEY Barber   2.5 mg, Oral, Daily      aspirin 81 MG EC tablet  What changed: additional instructions   81 mg, Oral, Daily      busPIRone 5 MG tablet  Commonly known as: BUSPAR  What changed:   when to take this  reasons to take this  additional instructions   10 mg, Oral, 3 Times Daily      valACYclovir 500 MG tablet  Commonly known as: Valtrex  What changed: additional instructions   500 mg, Oral, Daily             Continue These Medications        Instructions Start Date   Airborne Elderberry 100-50 MG chewable tablet   Oral      cholecalciferol 25 MCG (1000 UT) tablet  Commonly known as: VITAMIN D3   2,000 Units, Oral, Daily      cyanocobalamin 1000 MCG/ML injection   1,000 mcg, Intramuscular, Weekly      levothyroxine  "25 MCG tablet  Commonly known as: SYNTHROID, LEVOTHROID   TAKE 1 TABLET BY MOUTH DAILY      meloxicam 15 MG tablet  Commonly known as: MOBIC   1 tablet, Oral, Daily      metoprolol succinate  MG 24 hr tablet  Commonly known as: TOPROL-XL   100 mg, Oral, Daily      Semaglutide-Weight Management 0.5 MG/0.5ML solution auto-injector   0.5 mL, Subcutaneous, Weekly      Syringe/Needle (Disp) 23G X 1\" 3 ML misc   Use to inject vitamin b12 weekly      zolpidem 5 MG tablet  Commonly known as: AMBIEN   5 mg, Oral, Nightly PRN, As needed                    **Dragon Disclaimer:   Much of this encounter note is an electronic transcription/translation of spoken language to printed text. The electronic translation of spoken language may permit erroneous, or at times, nonsensical words or phrases to be inadvertently transcribed. Although I have reviewed the note for such errors, some may still exist.   "

## 2024-08-26 ENCOUNTER — CLINICAL SUPPORT (OUTPATIENT)
Dept: CARDIOLOGY | Facility: CLINIC | Age: 59
End: 2024-08-26
Payer: COMMERCIAL

## 2024-08-26 ENCOUNTER — TELEPHONE (OUTPATIENT)
Dept: CARDIOLOGY | Facility: CLINIC | Age: 59
End: 2024-08-26
Payer: COMMERCIAL

## 2024-08-26 DIAGNOSIS — R00.0 TACHYCARDIA: Primary | ICD-10-CM

## 2024-08-26 PROCEDURE — 93000 ELECTROCARDIOGRAM COMPLETE: CPT | Performed by: NURSE PRACTITIONER

## 2024-08-26 NOTE — TELEPHONE ENCOUNTER
08/26/24  11:16 EDT    She probably needs to come in for an ECG today.      ASHLEY Richmond  [facility]

## 2024-08-26 NOTE — PROGRESS NOTES
Procedure     ECG 12 Lead    Date/Time: 8/26/2024 3:04 PM  Performed by: Reva Kitchen APRN    Authorized by: Reva Kitchen APRN  Comparison: compared with previous ECG   Rhythm: sinus tachycardia  Rate: tachycardic  BPM: 100  Conduction: conduction normal  ST Segments: ST segments normal  T Waves: T waves normal  QRS axis: normal    Clinical impression: abnormal EKG         The Pt came in to get the EKG because this morning after she get out the bed his HR [id number], She had headache and dizziness too, She also said that she feels shaky inside her chest and her legs too. No chest pain , no palpitation. Her BP  /100 mmHg. Amlodipine 2.5mg daily  Metoprolol 100mg daily  Aspirin 81mg three times a week     Patient took her metoprolol at 8:30 this morning.       on the room     Ju 8/26 /24       I went to bedside to talk with patient.  Reassured.  That EKG revealed sinus tachycardia.  When discussing potential causes for sinus tachycardia patient admitted that over the weekend she consumes large amounts of alcohol.  I advised the patient that it was likely that her tachycardia was secondary to dehydration.  She is also on semaglutide which can worsen dehydration.  We discussed patient increasing water intake and utilizing electrolyte replacement drinks.  She verbalized understanding and will call the office for any problems.  She will also be transitioning her metoprolol succinate to evening doses and was advised to take metoprolol succinate 50 mg this evening, no doses in the morning and then resume her normal metoprolol succinate 100 mg tomorrow evening.

## 2024-08-26 NOTE — TELEPHONE ENCOUNTER
Patient calls because this morning before getting out of bed her HR was 125. She laid down for awhile longer and got it down to 115. She then got up and when she got up her HR was running 154. Right now her HR is 104. She said she feels shaky and she has a headache. She did not have any BP readings to provide. Below are her meds:    Amlodipine 2.5mg daily  Metoprolol 100mg daily  Aspirin 81mg three times a week    Patient took her metoprolol at 8:30 this morning.     Bea Faulkner RN  Triage [facility]

## 2024-12-09 ENCOUNTER — TELEPHONE (OUTPATIENT)
Dept: NEUROSURGERY | Facility: CLINIC | Age: 59
End: 2024-12-09

## 2024-12-09 ENCOUNTER — PREP FOR SURGERY (OUTPATIENT)
Dept: OTHER | Facility: HOSPITAL | Age: 59
End: 2024-12-09
Payer: COMMERCIAL

## 2024-12-09 ENCOUNTER — OFFICE VISIT (OUTPATIENT)
Dept: NEUROSURGERY | Facility: CLINIC | Age: 59
End: 2024-12-09
Payer: COMMERCIAL

## 2024-12-09 ENCOUNTER — ANCILLARY ORDERS (OUTPATIENT)
Dept: NEUROSURGERY | Facility: CLINIC | Age: 59
End: 2024-12-09

## 2024-12-09 VITALS
HEIGHT: 66 IN | WEIGHT: 179 LBS | HEART RATE: 99 BPM | SYSTOLIC BLOOD PRESSURE: 118 MMHG | OXYGEN SATURATION: 99 % | BODY MASS INDEX: 28.77 KG/M2 | DIASTOLIC BLOOD PRESSURE: 82 MMHG

## 2024-12-09 DIAGNOSIS — M43.16 SPONDYLOLISTHESIS OF LUMBAR REGION: Primary | ICD-10-CM

## 2024-12-09 DIAGNOSIS — M43.00 PARS DEFECT: ICD-10-CM

## 2024-12-09 DIAGNOSIS — M54.16 LUMBAR RADICULOPATHY: ICD-10-CM

## 2024-12-09 PROCEDURE — 99204 OFFICE O/P NEW MOD 45 MIN: CPT | Performed by: NEUROLOGICAL SURGERY

## 2024-12-09 RX ORDER — PROGESTERONE 200 MG/1
200 CAPSULE ORAL DAILY
COMMUNITY
Start: 2024-12-03

## 2024-12-09 RX ORDER — TRAMADOL HYDROCHLORIDE 200 MG/1
200 TABLET, EXTENDED RELEASE ORAL DAILY
COMMUNITY

## 2024-12-09 RX ORDER — ESTRADIOL 0.05 MG/D
1 PATCH TRANSDERMAL
COMMUNITY
Start: 2024-12-03

## 2024-12-09 NOTE — TELEPHONE ENCOUNTER
Patient is out for her hand from January until the beginning of February. She would like to see if she can do her surgery in that time so should wouldn't have to take so much more time off,

## 2024-12-09 NOTE — PROGRESS NOTES
"Subjective   History of Present Illness: Alecia Hunter is a 59 y.o. female is being seen for consultation today at the request of Margarito Monroe MD for Lumbar radiculopathy. Today patient reports left sided lower back pain with left leg weakness.  Patient has a progressive history of the course of years of low back pain and leg pain left worse than right.  Patient describes the pain as banding across her low back and will radiate into her lower extremities often times past the knee into the foot.  This is associated with numbness and tingling.  Patient is undergone extensive conservative measures including physical therapy and multiple epidural injections without any lasting improvement.    Chief Complaint   Patient presents with    Back Pain          Previous treatment: PT, over-the-counter prescription pain medication    Previous neurosurgery:      Previous injections: Epidural injection    The following portions of the patient's history were reviewed and updated as appropriate: allergies, current medications, past family history, past medical history, past social history, past surgical history, and problem list.    Review of Systems   Constitutional:  Positive for activity change.   HENT: Negative.     Eyes: Negative.    Respiratory: Negative.     Cardiovascular: Negative.    Gastrointestinal: Negative.    Endocrine: Negative.    Genitourinary: Negative.    Musculoskeletal:  Positive for arthralgias, back pain (left sided lower/right side at rest) and myalgias.   Skin: Negative.    Allergic/Immunologic: Negative.    Neurological:  Positive for weakness (left leg).   Hematological: Negative.    Psychiatric/Behavioral:  Positive for sleep disturbance.        Objective      /82 (BP Location: Right arm, Patient Position: Sitting)   Pulse 99   Ht 167.6 cm (66\")   Wt 81.2 kg (179 lb)   SpO2 99%   BMI 28.89 kg/m²    Body mass index is 28.89 kg/m².  There were no vitals filed for this visit.   "    Neurological Exam  Mental Status  Awake, alert and oriented to person, place and time.    Motor   Strength is 5/5 throughout all four extremities.    Sensory  Sensation is intact to light touch, pinprick, vibration and proprioception in all four extremities.    Reflexes    Right pathological reflexes: Jaylene's absent.  Left pathological reflexes: Jaylene's absent.          Assessment & Plan   Independent Review of Radiographic Studies:      I personally reviewed and interpreted the images from the following studies.    MRI lumbar spine: There are pars defects bilaterally of L4 and L5.  There is mild spondylolisthesis of L4-5 and foraminal stenosis left worse than right.  There is grade 1 spondylolisthesis of L5-S1 with severe foraminal stenosis    Medical Decision Making:      Alecia uHnter is a 59 y.o. female with a history of worsening low back pain and radiculopathy with spondylolysis and spondylolisthesis from L4-S1 with foraminal stenosis.  Patient has failed all conservative measures and would benefit from surgical intervention in the form of L4-S1 TLIF.  Patient will need cardiac clearance      Diagnoses and all orders for this visit:    1. Spondylolisthesis of lumbar region (Primary)    2. Lumbar radiculopathy    3. Pars defect      No follow-ups on file.    This patient was examined wearing appropriate personal protective equipment.                      Dr. Delvin Loredo IV    12/09/24  14:59 EST

## 2024-12-13 ENCOUNTER — PATIENT ROUNDING (BHMG ONLY) (OUTPATIENT)
Dept: NEUROSURGERY | Facility: CLINIC | Age: 59
End: 2024-12-13
Payer: COMMERCIAL

## 2024-12-16 NOTE — TELEPHONE ENCOUNTER
RETURNED CALL TO PATIENT LET HER KNOW I AM WAITING ON CLEARANCE LETTER FROM DR CHONG ONCE I GET THIS I WILL SUBMIT FOR AUTH. PATIENT VERBALLY UNDERSTOOD

## 2024-12-18 ENCOUNTER — TELEPHONE (OUTPATIENT)
Dept: NEUROSURGERY | Facility: CLINIC | Age: 59
End: 2024-12-18
Payer: COMMERCIAL

## 2025-01-08 ENCOUNTER — HOSPITAL ENCOUNTER (INPATIENT)
Facility: HOSPITAL | Age: 60
LOS: 3 days | Discharge: HOME | DRG: 418 | End: 2025-01-11
Attending: EMERGENCY MEDICINE | Admitting: INTERNAL MEDICINE
Payer: COMMERCIAL

## 2025-01-08 ENCOUNTER — APPOINTMENT (OUTPATIENT)
Dept: CT IMAGING | Facility: HOSPITAL | Age: 60
DRG: 418 | End: 2025-01-08
Payer: COMMERCIAL

## 2025-01-08 DIAGNOSIS — K83.8 COMMON BILE DUCT DILATION: ICD-10-CM

## 2025-01-08 DIAGNOSIS — K81.9 CHOLECYSTITIS: ICD-10-CM

## 2025-01-08 DIAGNOSIS — R10.13 EPIGASTRIC PAIN: Primary | ICD-10-CM

## 2025-01-08 PROBLEM — E87.20 LACTIC ACIDOSIS: Status: ACTIVE | Noted: 2025-01-08

## 2025-01-08 LAB
ALBUMIN SERPL-MCNC: 5.1 G/DL (ref 3.5–5.2)
ALBUMIN/GLOB SERPL: 1.2 G/DL
ALP SERPL-CCNC: 147 U/L (ref 39–117)
ALT SERPL W P-5'-P-CCNC: 17 U/L (ref 1–33)
ANION GAP SERPL CALCULATED.3IONS-SCNC: 23.9 MMOL/L (ref 5–15)
AST SERPL-CCNC: 42 U/L (ref 1–32)
BASOPHILS # BLD AUTO: 0.05 10*3/MM3 (ref 0–0.2)
BASOPHILS NFR BLD AUTO: 0.4 % (ref 0–1.5)
BILIRUB SERPL-MCNC: 0.6 MG/DL (ref 0–1.2)
BILIRUB UR QL STRIP: NEGATIVE
BUN SERPL-MCNC: 11 MG/DL (ref 8–23)
BUN/CREAT SERPL: 10 (ref 7–25)
CALCIUM SPEC-SCNC: 10.9 MG/DL (ref 8.6–10.5)
CHLORIDE SERPL-SCNC: 92 MMOL/L (ref 98–107)
CLARITY UR: CLEAR
CO2 SERPL-SCNC: 18.1 MMOL/L (ref 22–29)
COLOR UR: YELLOW
CREAT SERPL-MCNC: 1.1 MG/DL (ref 0.57–1)
D-LACTATE SERPL-SCNC: 0.9 MMOL/L (ref 0.5–2)
D-LACTATE SERPL-SCNC: 3.2 MMOL/L (ref 0.3–2)
DEPRECATED RDW RBC AUTO: 44.9 FL (ref 37–54)
EGFRCR SERPLBLD CKD-EPI 2021: 57.6 ML/MIN/1.73
EOSINOPHIL # BLD AUTO: 0.03 10*3/MM3 (ref 0–0.4)
EOSINOPHIL NFR BLD AUTO: 0.2 % (ref 0.3–6.2)
ERYTHROCYTE [DISTWIDTH] IN BLOOD BY AUTOMATED COUNT: 13.3 % (ref 12.3–15.4)
GLOBULIN UR ELPH-MCNC: 4.4 GM/DL
GLUCOSE SERPL-MCNC: 120 MG/DL (ref 65–99)
GLUCOSE UR STRIP-MCNC: NEGATIVE MG/DL
HCT VFR BLD AUTO: 49.2 % (ref 34–46.6)
HGB BLD-MCNC: 16.8 G/DL (ref 12–15.9)
HGB UR QL STRIP.AUTO: NEGATIVE
HOLD SPECIMEN: NORMAL
HOLD SPECIMEN: NORMAL
IMM GRANULOCYTES # BLD AUTO: 0.04 10*3/MM3 (ref 0–0.05)
IMM GRANULOCYTES NFR BLD AUTO: 0.3 % (ref 0–0.5)
KETONES UR QL STRIP: ABNORMAL
LEUKOCYTE ESTERASE UR QL STRIP.AUTO: NEGATIVE
LIPASE SERPL-CCNC: 37 U/L (ref 13–60)
LYMPHOCYTES # BLD AUTO: 3.03 10*3/MM3 (ref 0.7–3.1)
LYMPHOCYTES NFR BLD AUTO: 23.4 % (ref 19.6–45.3)
MCH RBC QN AUTO: 31.2 PG (ref 26.6–33)
MCHC RBC AUTO-ENTMCNC: 34.1 G/DL (ref 31.5–35.7)
MCV RBC AUTO: 91.3 FL (ref 79–97)
MONOCYTES # BLD AUTO: 0.94 10*3/MM3 (ref 0.1–0.9)
MONOCYTES NFR BLD AUTO: 7.3 % (ref 5–12)
NEUTROPHILS NFR BLD AUTO: 68.4 % (ref 42.7–76)
NEUTROPHILS NFR BLD AUTO: 8.86 10*3/MM3 (ref 1.7–7)
NITRITE UR QL STRIP: NEGATIVE
NRBC BLD AUTO-RTO: 0 /100 WBC (ref 0–0.2)
PH UR STRIP.AUTO: 5.5 [PH] (ref 5–8)
PLATELET # BLD AUTO: 371 10*3/MM3 (ref 140–450)
PMV BLD AUTO: 9 FL (ref 6–12)
POTASSIUM SERPL-SCNC: 4.5 MMOL/L (ref 3.5–5.2)
PROT SERPL-MCNC: 9.5 G/DL (ref 6–8.5)
PROT UR QL STRIP: NEGATIVE
QT INTERVAL: 362 MS
QTC INTERVAL: 440 MS
RBC # BLD AUTO: 5.39 10*6/MM3 (ref 3.77–5.28)
SODIUM SERPL-SCNC: 134 MMOL/L (ref 136–145)
SP GR UR STRIP: 1.01 (ref 1–1.03)
UROBILINOGEN UR QL STRIP: ABNORMAL
WBC NRBC COR # BLD AUTO: 12.95 10*3/MM3 (ref 3.4–10.8)
WHOLE BLOOD HOLD COAG: NORMAL
WHOLE BLOOD HOLD SPECIMEN: NORMAL

## 2025-01-08 PROCEDURE — 81003 URINALYSIS AUTO W/O SCOPE: CPT | Performed by: EMERGENCY MEDICINE

## 2025-01-08 PROCEDURE — 25010000002 HYDROMORPHONE 1 MG/ML SOLUTION: Performed by: EMERGENCY MEDICINE

## 2025-01-08 PROCEDURE — 74177 CT ABD & PELVIS W/CONTRAST: CPT

## 2025-01-08 PROCEDURE — 83690 ASSAY OF LIPASE: CPT | Performed by: EMERGENCY MEDICINE

## 2025-01-08 PROCEDURE — 36415 COLL VENOUS BLD VENIPUNCTURE: CPT

## 2025-01-08 PROCEDURE — 85025 COMPLETE CBC W/AUTO DIFF WBC: CPT | Performed by: EMERGENCY MEDICINE

## 2025-01-08 PROCEDURE — 99285 EMERGENCY DEPT VISIT HI MDM: CPT

## 2025-01-08 PROCEDURE — 87040 BLOOD CULTURE FOR BACTERIA: CPT | Performed by: EMERGENCY MEDICINE

## 2025-01-08 PROCEDURE — 25010000002 ONDANSETRON PER 1 MG: Performed by: EMERGENCY MEDICINE

## 2025-01-08 PROCEDURE — 83605 ASSAY OF LACTIC ACID: CPT

## 2025-01-08 PROCEDURE — 25010000002 METRONIDAZOLE 500 MG/100ML SOLUTION: Performed by: EMERGENCY MEDICINE

## 2025-01-08 PROCEDURE — 25810000003 SODIUM CHLORIDE 0.9 % SOLUTION: Performed by: EMERGENCY MEDICINE

## 2025-01-08 PROCEDURE — 93005 ELECTROCARDIOGRAM TRACING: CPT | Performed by: EMERGENCY MEDICINE

## 2025-01-08 PROCEDURE — 25510000001 IOPAMIDOL PER 1 ML: Performed by: EMERGENCY MEDICINE

## 2025-01-08 PROCEDURE — 80053 COMPREHEN METABOLIC PANEL: CPT | Performed by: EMERGENCY MEDICINE

## 2025-01-08 PROCEDURE — 25010000002 CEFTRIAXONE PER 250 MG: Performed by: EMERGENCY MEDICINE

## 2025-01-08 RX ORDER — SODIUM CHLORIDE 0.9 % (FLUSH) 0.9 %
10 SYRINGE (ML) INJECTION EVERY 12 HOURS SCHEDULED
Status: DISCONTINUED | OUTPATIENT
Start: 2025-01-08 | End: 2025-01-11 | Stop reason: HOSPADM

## 2025-01-08 RX ORDER — SODIUM CHLORIDE 0.9 % (FLUSH) 0.9 %
10 SYRINGE (ML) INJECTION AS NEEDED
Status: DISCONTINUED | OUTPATIENT
Start: 2025-01-08 | End: 2025-01-11 | Stop reason: HOSPADM

## 2025-01-08 RX ORDER — IOPAMIDOL 755 MG/ML
100 INJECTION, SOLUTION INTRAVASCULAR
Status: COMPLETED | OUTPATIENT
Start: 2025-01-08 | End: 2025-01-08

## 2025-01-08 RX ORDER — ZOLPIDEM TARTRATE 5 MG/1
5 TABLET ORAL NIGHTLY PRN
Status: DISCONTINUED | OUTPATIENT
Start: 2025-01-08 | End: 2025-01-11 | Stop reason: HOSPADM

## 2025-01-08 RX ORDER — ALUMINA, MAGNESIA, AND SIMETHICONE 2400; 2400; 240 MG/30ML; MG/30ML; MG/30ML
15 SUSPENSION ORAL EVERY 6 HOURS PRN
Status: DISCONTINUED | OUTPATIENT
Start: 2025-01-08 | End: 2025-01-11 | Stop reason: HOSPADM

## 2025-01-08 RX ORDER — ONDANSETRON 4 MG/1
4 TABLET, ORALLY DISINTEGRATING ORAL EVERY 6 HOURS PRN
Status: DISCONTINUED | OUTPATIENT
Start: 2025-01-08 | End: 2025-01-11 | Stop reason: HOSPADM

## 2025-01-08 RX ORDER — SODIUM CHLORIDE 9 MG/ML
40 INJECTION, SOLUTION INTRAVENOUS AS NEEDED
Status: DISCONTINUED | OUTPATIENT
Start: 2025-01-08 | End: 2025-01-11 | Stop reason: HOSPADM

## 2025-01-08 RX ORDER — METOPROLOL SUCCINATE 50 MG/1
100 TABLET, EXTENDED RELEASE ORAL NIGHTLY
Status: DISCONTINUED | OUTPATIENT
Start: 2025-01-09 | End: 2025-01-11 | Stop reason: HOSPADM

## 2025-01-08 RX ORDER — METRONIDAZOLE 500 MG/100ML
500 INJECTION, SOLUTION INTRAVENOUS ONCE
Status: COMPLETED | OUTPATIENT
Start: 2025-01-08 | End: 2025-01-08

## 2025-01-08 RX ORDER — ACETAMINOPHEN 325 MG/1
650 TABLET ORAL EVERY 4 HOURS PRN
Status: DISCONTINUED | OUTPATIENT
Start: 2025-01-08 | End: 2025-01-11 | Stop reason: HOSPADM

## 2025-01-08 RX ORDER — BISACODYL 10 MG
10 SUPPOSITORY, RECTAL RECTAL DAILY PRN
Status: DISCONTINUED | OUTPATIENT
Start: 2025-01-08 | End: 2025-01-11 | Stop reason: HOSPADM

## 2025-01-08 RX ORDER — POLYETHYLENE GLYCOL 3350 17 G/17G
17 POWDER, FOR SOLUTION ORAL DAILY PRN
Status: DISCONTINUED | OUTPATIENT
Start: 2025-01-08 | End: 2025-01-11 | Stop reason: HOSPADM

## 2025-01-08 RX ORDER — AMLODIPINE BESYLATE 2.5 MG/1
2.5 TABLET ORAL NIGHTLY
Status: DISCONTINUED | OUTPATIENT
Start: 2025-01-09 | End: 2025-01-11 | Stop reason: HOSPADM

## 2025-01-08 RX ORDER — FAMOTIDINE 10 MG/ML
20 INJECTION, SOLUTION INTRAVENOUS ONCE
Status: COMPLETED | OUTPATIENT
Start: 2025-01-08 | End: 2025-01-08

## 2025-01-08 RX ORDER — ACETAMINOPHEN 650 MG/1
650 SUPPOSITORY RECTAL EVERY 4 HOURS PRN
Status: DISCONTINUED | OUTPATIENT
Start: 2025-01-08 | End: 2025-01-11 | Stop reason: HOSPADM

## 2025-01-08 RX ORDER — HYDROMORPHONE HYDROCHLORIDE 1 MG/ML
0.5 INJECTION, SOLUTION INTRAMUSCULAR; INTRAVENOUS; SUBCUTANEOUS
Status: DISCONTINUED | OUTPATIENT
Start: 2025-01-08 | End: 2025-01-11 | Stop reason: HOSPADM

## 2025-01-08 RX ORDER — ACETAMINOPHEN 160 MG/5ML
650 SOLUTION ORAL EVERY 4 HOURS PRN
Status: DISCONTINUED | OUTPATIENT
Start: 2025-01-08 | End: 2025-01-11 | Stop reason: HOSPADM

## 2025-01-08 RX ORDER — AMOXICILLIN 250 MG
2 CAPSULE ORAL 2 TIMES DAILY
Status: DISCONTINUED | OUTPATIENT
Start: 2025-01-08 | End: 2025-01-11 | Stop reason: HOSPADM

## 2025-01-08 RX ORDER — SODIUM CHLORIDE, SODIUM LACTATE, POTASSIUM CHLORIDE, CALCIUM CHLORIDE 600; 310; 30; 20 MG/100ML; MG/100ML; MG/100ML; MG/100ML
125 INJECTION, SOLUTION INTRAVENOUS CONTINUOUS
Status: DISCONTINUED | OUTPATIENT
Start: 2025-01-09 | End: 2025-01-09

## 2025-01-08 RX ORDER — METRONIDAZOLE 500 MG/100ML
500 INJECTION, SOLUTION INTRAVENOUS EVERY 8 HOURS
Status: DISCONTINUED | OUTPATIENT
Start: 2025-01-09 | End: 2025-01-09

## 2025-01-08 RX ORDER — ONDANSETRON 2 MG/ML
4 INJECTION INTRAMUSCULAR; INTRAVENOUS ONCE
Status: COMPLETED | OUTPATIENT
Start: 2025-01-08 | End: 2025-01-08

## 2025-01-08 RX ORDER — ONDANSETRON 2 MG/ML
4 INJECTION INTRAMUSCULAR; INTRAVENOUS EVERY 6 HOURS PRN
Status: DISCONTINUED | OUTPATIENT
Start: 2025-01-08 | End: 2025-01-11 | Stop reason: HOSPADM

## 2025-01-08 RX ORDER — BISACODYL 5 MG/1
5 TABLET, DELAYED RELEASE ORAL DAILY PRN
Status: DISCONTINUED | OUTPATIENT
Start: 2025-01-08 | End: 2025-01-11 | Stop reason: HOSPADM

## 2025-01-08 RX ADMIN — CEFTRIAXONE 2000 MG: 2 INJECTION, POWDER, FOR SOLUTION INTRAMUSCULAR; INTRAVENOUS at 21:39

## 2025-01-08 RX ADMIN — SODIUM CHLORIDE 1000 ML: 9 INJECTION, SOLUTION INTRAVENOUS at 20:31

## 2025-01-08 RX ADMIN — METRONIDAZOLE 500 MG: 500 INJECTION, SOLUTION INTRAVENOUS at 21:36

## 2025-01-08 RX ADMIN — IOPAMIDOL 100 ML: 755 INJECTION, SOLUTION INTRAVENOUS at 20:52

## 2025-01-08 RX ADMIN — FAMOTIDINE 20 MG: 10 INJECTION INTRAVENOUS at 19:38

## 2025-01-08 RX ADMIN — ONDANSETRON 4 MG: 2 INJECTION INTRAMUSCULAR; INTRAVENOUS at 19:37

## 2025-01-08 RX ADMIN — HYDROMORPHONE HYDROCHLORIDE 1 MG: 1 INJECTION, SOLUTION INTRAMUSCULAR; INTRAVENOUS; SUBCUTANEOUS at 19:38

## 2025-01-08 NOTE — Clinical Note
Level of Care: Med/Surg [1]  Diagnosis: Cholecystitis [[id number]]  Admitting Physician: IRINA COWAN [[id number]]  Attending Physician: IRINA COWAN [[id number]]  Isolate for COVID?: No [0]  Certification: I Certify That Inpatient Hospital Services Are Medica lly Necessary For Greater Than 2 Midnights

## 2025-01-09 ENCOUNTER — INPATIENT HOSPITAL (OUTPATIENT)
Dept: URBAN - METROPOLITAN AREA HOSPITAL 84 | Facility: HOSPITAL | Age: 60
End: 2025-01-09

## 2025-01-09 ENCOUNTER — APPOINTMENT (OUTPATIENT)
Dept: ULTRASOUND IMAGING | Facility: HOSPITAL | Age: 60
DRG: 418 | End: 2025-01-09
Payer: COMMERCIAL

## 2025-01-09 DIAGNOSIS — R10.13 EPIGASTRIC PAIN: ICD-10-CM

## 2025-01-09 LAB
B PARAPERT DNA SPEC QL NAA+PROBE: NOT DETECTED
B PERT DNA SPEC QL NAA+PROBE: NOT DETECTED
C PNEUM DNA NPH QL NAA+NON-PROBE: NOT DETECTED
FLUAV SUBTYP SPEC NAA+PROBE: NOT DETECTED
FLUBV RNA ISLT QL NAA+PROBE: NOT DETECTED
HADV DNA SPEC NAA+PROBE: NOT DETECTED
HBA1C MFR BLD: 5.32 % (ref 4.8–5.6)
HCOV 229E RNA SPEC QL NAA+PROBE: NOT DETECTED
HCOV HKU1 RNA SPEC QL NAA+PROBE: NOT DETECTED
HCOV NL63 RNA SPEC QL NAA+PROBE: NOT DETECTED
HCOV OC43 RNA SPEC QL NAA+PROBE: NOT DETECTED
HMPV RNA NPH QL NAA+NON-PROBE: NOT DETECTED
HPIV1 RNA ISLT QL NAA+PROBE: NOT DETECTED
HPIV2 RNA SPEC QL NAA+PROBE: NOT DETECTED
HPIV3 RNA NPH QL NAA+PROBE: NOT DETECTED
HPIV4 P GENE NPH QL NAA+PROBE: NOT DETECTED
M PNEUMO IGG SER IA-ACNC: NOT DETECTED
QT INTERVAL: 278 MS
QTC INTERVAL: 368 MS
RHINOVIRUS RNA SPEC NAA+PROBE: NOT DETECTED
RSV RNA NPH QL NAA+NON-PROBE: NOT DETECTED
SARS-COV-2 RNA RESP QL NAA+PROBE: NOT DETECTED

## 2025-01-09 PROCEDURE — 83036 HEMOGLOBIN GLYCOSYLATED A1C: CPT | Performed by: INTERNAL MEDICINE

## 2025-01-09 PROCEDURE — 99222 1ST HOSP IP/OBS MODERATE 55: CPT

## 2025-01-09 PROCEDURE — 25010000002 ONDANSETRON PER 1 MG: Performed by: INTERNAL MEDICINE

## 2025-01-09 PROCEDURE — 99222 1ST HOSP IP/OBS MODERATE 55: CPT | Performed by: SURGERY

## 2025-01-09 PROCEDURE — 25810000003 LACTATED RINGERS PER 1000 ML: Performed by: INTERNAL MEDICINE

## 2025-01-09 PROCEDURE — 25010000002 HYDROMORPHONE PER 4 MG: Performed by: INTERNAL MEDICINE

## 2025-01-09 PROCEDURE — 76705 ECHO EXAM OF ABDOMEN: CPT

## 2025-01-09 PROCEDURE — 0202U NFCT DS 22 TRGT SARS-COV-2: CPT | Performed by: INTERNAL MEDICINE

## 2025-01-09 PROCEDURE — 25010000002 METRONIDAZOLE 500 MG/100ML SOLUTION: Performed by: INTERNAL MEDICINE

## 2025-01-09 PROCEDURE — 25010000002 CEFTRIAXONE PER 250 MG: Performed by: INTERNAL MEDICINE

## 2025-01-09 RX ORDER — LEVOTHYROXINE SODIUM 25 UG/1
25 TABLET ORAL
Status: DISCONTINUED | OUTPATIENT
Start: 2025-01-10 | End: 2025-01-09

## 2025-01-09 RX ORDER — INDOCYANINE GREEN AND WATER 25 MG
2.5 KIT INJECTION ONCE
Status: COMPLETED | OUTPATIENT
Start: 2025-01-10 | End: 2025-01-10

## 2025-01-09 RX ORDER — TRAMADOL HYDROCHLORIDE 50 MG/1
50 TABLET ORAL EVERY 6 HOURS PRN
Status: DISCONTINUED | OUTPATIENT
Start: 2025-01-09 | End: 2025-01-11 | Stop reason: HOSPADM

## 2025-01-09 RX ORDER — PROGESTERONE 200 MG/1
200 CAPSULE ORAL DAILY
Status: DISCONTINUED | OUTPATIENT
Start: 2025-01-09 | End: 2025-01-11 | Stop reason: HOSPADM

## 2025-01-09 RX ORDER — PANTOPRAZOLE SODIUM 40 MG/10ML
40 INJECTION, POWDER, LYOPHILIZED, FOR SOLUTION INTRAVENOUS
Status: DISCONTINUED | OUTPATIENT
Start: 2025-01-09 | End: 2025-01-11 | Stop reason: HOSPADM

## 2025-01-09 RX ORDER — HYDROCODONE BITARTRATE AND ACETAMINOPHEN 5; 325 MG/1; MG/1
1 TABLET ORAL EVERY 6 HOURS PRN
Status: DISCONTINUED | OUTPATIENT
Start: 2025-01-09 | End: 2025-01-10 | Stop reason: SDUPTHER

## 2025-01-09 RX ORDER — HYDROXYZINE HYDROCHLORIDE 25 MG/1
25 TABLET, FILM COATED ORAL 3 TIMES DAILY PRN
Status: DISCONTINUED | OUTPATIENT
Start: 2025-01-09 | End: 2025-01-11 | Stop reason: HOSPADM

## 2025-01-09 RX ORDER — LEVOTHYROXINE SODIUM 25 UG/1
25 TABLET ORAL NIGHTLY
Status: DISCONTINUED | OUTPATIENT
Start: 2025-01-09 | End: 2025-01-11 | Stop reason: HOSPADM

## 2025-01-09 RX ADMIN — ONDANSETRON 4 MG: 2 INJECTION INTRAMUSCULAR; INTRAVENOUS at 13:44

## 2025-01-09 RX ADMIN — TRAMADOL HYDROCHLORIDE 50 MG: 50 TABLET, COATED ORAL at 11:02

## 2025-01-09 RX ADMIN — SENNOSIDES AND DOCUSATE SODIUM 2 TABLET: 50; 8.6 TABLET ORAL at 21:01

## 2025-01-09 RX ADMIN — AMLODIPINE BESYLATE 2.5 MG: 2.5 TABLET ORAL at 00:02

## 2025-01-09 RX ADMIN — AMLODIPINE BESYLATE 2.5 MG: 2.5 TABLET ORAL at 21:01

## 2025-01-09 RX ADMIN — HYDROMORPHONE HYDROCHLORIDE 0.5 MG: 1 INJECTION, SOLUTION INTRAMUSCULAR; INTRAVENOUS; SUBCUTANEOUS at 08:29

## 2025-01-09 RX ADMIN — METOPROLOL SUCCINATE 100 MG: 50 TABLET, EXTENDED RELEASE ORAL at 00:02

## 2025-01-09 RX ADMIN — LEVOTHYROXINE SODIUM 25 MCG: 0.03 TABLET ORAL at 21:48

## 2025-01-09 RX ADMIN — PANTOPRAZOLE SODIUM 40 MG: 40 INJECTION, POWDER, FOR SOLUTION INTRAVENOUS at 17:39

## 2025-01-09 RX ADMIN — HYDROMORPHONE HYDROCHLORIDE 0.5 MG: 1 INJECTION, SOLUTION INTRAMUSCULAR; INTRAVENOUS; SUBCUTANEOUS at 04:21

## 2025-01-09 RX ADMIN — ZOLPIDEM TARTRATE 5 MG: 5 TABLET ORAL at 00:01

## 2025-01-09 RX ADMIN — SODIUM CHLORIDE, POTASSIUM CHLORIDE, SODIUM LACTATE AND CALCIUM CHLORIDE 125 ML/HR: 600; 310; 30; 20 INJECTION, SOLUTION INTRAVENOUS at 00:03

## 2025-01-09 RX ADMIN — PROGESTERONE 200 MG: 200 CAPSULE ORAL at 21:48

## 2025-01-09 RX ADMIN — HYDROCODONE BITARTRATE AND ACETAMINOPHEN 1 TABLET: 5; 325 TABLET ORAL at 21:48

## 2025-01-09 RX ADMIN — HYDROXYZINE HYDROCHLORIDE 25 MG: 25 TABLET, FILM COATED ORAL at 11:02

## 2025-01-09 RX ADMIN — Medication 10 ML: at 21:00

## 2025-01-09 RX ADMIN — METOPROLOL SUCCINATE 100 MG: 50 TABLET, EXTENDED RELEASE ORAL at 21:00

## 2025-01-09 RX ADMIN — METRONIDAZOLE 500 MG: 500 INJECTION, SOLUTION INTRAVENOUS at 13:45

## 2025-01-09 RX ADMIN — HYDROXYZINE HYDROCHLORIDE 25 MG: 25 TABLET, FILM COATED ORAL at 21:01

## 2025-01-09 RX ADMIN — METRONIDAZOLE 500 MG: 500 INJECTION, SOLUTION INTRAVENOUS at 04:23

## 2025-01-09 RX ADMIN — ONDANSETRON 4 MG: 2 INJECTION INTRAMUSCULAR; INTRAVENOUS at 08:29

## 2025-01-09 RX ADMIN — HYDROMORPHONE HYDROCHLORIDE 0.5 MG: 1 INJECTION, SOLUTION INTRAMUSCULAR; INTRAVENOUS; SUBCUTANEOUS at 00:03

## 2025-01-09 RX ADMIN — HYDROMORPHONE HYDROCHLORIDE 0.5 MG: 1 INJECTION, SOLUTION INTRAMUSCULAR; INTRAVENOUS; SUBCUTANEOUS at 13:44

## 2025-01-09 RX ADMIN — ONDANSETRON 4 MG: 2 INJECTION INTRAMUSCULAR; INTRAVENOUS at 00:02

## 2025-01-09 RX ADMIN — CEFTRIAXONE 2000 MG: 2 INJECTION, POWDER, FOR SOLUTION INTRAMUSCULAR; INTRAVENOUS at 21:02

## 2025-01-09 NOTE — CONSULTS
"    GI CONSULT  NOTE:    Referring Provider:  Dr. Ramsey    Chief complaint: epigastric pain with N/V    Subjective .     History of present illness: Alecia Hunter is a 59 y.o. female with PMH gastric bypass, SHABANA, hypothyroidism, and hypertension.  She reported to the hospital due to severe epigastric pain with nausea and vomiting.  GI was consulted for abdominal pain with dilated CBD.  Patient reports epigastric pain that started yesterday about 5:00 PM.  Pain radiated up to both shoulder blades.  She has never had pain like this before. The pain came on abruptly and waxes and wanes in severity.  She suspected this was due to indigestion, Zantac did not help any.  Belching seems to help some.  She had a few incidences of nausea and vomiting after the pain started.  She reports waking up with chills and a fever yesterday and \"felt off.\".  She started taking Ozempic about 8 months ago.  She takes daily meloxicam.      Endo History:  2023 colonoscopy by Dr. Malik showed 2 TA polyps.  Internal hemorrhoids.  5-year recall.    Past Medical History:  Past Medical History:   Diagnosis Date    Constipation 2016    Depression 2016    Disease of thyroid gland     History of colon polyps     History of transfusion     Hypertension     Preop examination 2021    Screening for diabetes mellitus 2018    Walking pneumonia     Weight gain 2016       Past Surgical History:  Past Surgical History:   Procedure Laterality Date    AUGMENTATION MAMMAPLASTY Bilateral     ssaline     SECTION      COLONOSCOPY  2016    LEC    COLONOSCOPY N/A 2023    Procedure: COLONOSCOPY INTO TERMINAL ILEUM WITH POLYPECTOMY (COLD SNARE)and  (HOT);  Surgeon: Reva Malik MD;  Location: [facility] ENDOSCOPY;  Service: Gastroenterology;  Laterality: N/A;  PREOP/ SCREENING- POSTOP/ POLYPS, HEMORRHOIDS    GASTRIC BYPASS      HERNIA REPAIR      OOPHORECTOMY Left     REDUCTION MAMMAPLASTY Bilateral " 2005       Social History:  Social History     Tobacco Use    Smoking status: Never     Passive exposure: Past (Parents smoked in home)    Smokeless tobacco: Never    Tobacco comments:     NO CAFFEINE    Vaping Use    Vaping status: Never Used   Substance Use Topics    Alcohol use: Yes     Alcohol/week: 8.0 standard drinks of alcohol     Types: 6 Glasses of wine, 2 Drinks containing 0.5 oz of alcohol per week     Comment: socially    Drug use: Never       Family History:  Family History   Problem Relation Age of Onset    COPD Mother     Cancer Father         colon cancer    Colon cancer Father     Heart attack Father 62    Malig Hyperthermia Neg Hx        Medications:  Medications Prior to Admission   Medication Sig Dispense Refill Last Dose/Taking    amLODIPine (NORVASC) 2.5 MG tablet Take 1 tablet by mouth Daily. 90 tablet 3 1/7/2025    aspirin 81 MG EC tablet Take 1 tablet by mouth Daily. (Patient taking differently: Take 1 tablet by mouth Daily. Three times a week)   1/7/2025    cholecalciferol (VITAMIN D3) 25 MCG (1000 UT) tablet Take 2 tablets by mouth Daily.   1/7/2025    cyanocobalamin 1000 MCG/ML injection Inject 1 mL into the appropriate muscle as directed by prescriber 1 (One) Time Per Week.   Past Week    estradiol (CLIMARA) 0.05 MG/24HR patch Place 1 patch on the skin as directed by provider.   1/8/2025    hydrOXYzine (ATARAX) 25 MG tablet Take 1 tablet by mouth.   1/8/2025    levothyroxine (SYNTHROID, LEVOTHROID) 25 MCG tablet TAKE 1 TABLET BY MOUTH DAILY 90 tablet 1 1/8/2025    meloxicam (MOBIC) 15 MG tablet Take 1 tablet by mouth Daily.   Past Week    metoprolol succinate XL (TOPROL-XL) 100 MG 24 hr tablet Take 1 tablet by mouth Daily. 90 tablet 3 1/8/2025    Progesterone (PROMETRIUM) 200 MG capsule Take 1 capsule by mouth Daily.   1/8/2025    Semaglutide,0.25 or 0.5MG/DOS, (Ozempic, 0.25 or 0.5 MG/DOSE,) 2 MG/1.5ML solution pen-injector INJECT 0.25MG INTO THE SKIN ONCE WEEKLY X 4 DOSES THEN 0.5MG  "WEEKLY   Past Week    Semaglutide-Weight Management 0.5 MG/0.5ML solution auto-injector Inject 0.5 mL under the skin into the appropriate area as directed 1 (One) Time Per Week.   Past Week    Syringe/Needle, Disp, 23G X 1\" 3 ML misc Use to inject vitamin b12 weekly 100 each 3 Past Week    traMADol ER (ULTRAM-ER) 200 MG 24 hr tablet Take 1 tablet by mouth Daily.   1/7/2025    valACYclovir (Valtrex) 500 MG tablet Take 1 tablet by mouth Daily. (Patient taking differently: Take 1 tablet by mouth Daily. PRN) 90 tablet 3 1/7/2025    zolpidem (AMBIEN) 5 MG tablet Take 1 tablet by mouth At Night As Needed. As needed   1/8/2025    amLODIPine (NORVASC) 5 MG tablet Take 1 tablet by mouth Daily.       busPIRone (BUSPAR) 5 MG tablet Take 2 tablets by mouth 3 (Three) Times a Day. (Patient taking differently: Take 2 tablets by mouth 3 (Three) Times a Day As Needed. As needed) 180 tablet 3     Misc Natural Products (Airborne Elderberry) chewable tablet Chew.          Scheduled Meds:amLODIPine, 2.5 mg, Oral, Nightly  cefTRIAXone, 2,000 mg, Intravenous, Q24H  metoprolol succinate XL, 100 mg, Oral, Nightly  metroNIDAZOLE, 500 mg, Intravenous, Q8H  senna-docusate sodium, 2 tablet, Oral, BID  sodium chloride, 10 mL, Intravenous, Q12H      Continuous Infusions:lactated ringers, 125 mL/hr, Last Rate: 125 mL/hr (01/09/25 0003)      PRN Meds:.  acetaminophen **OR** acetaminophen **OR** acetaminophen    aluminum-magnesium hydroxide-simethicone    senna-docusate sodium **AND** polyethylene glycol **AND** bisacodyl **AND** bisacodyl    HYDROmorphone    ondansetron ODT **OR** ondansetron    sodium chloride    sodium chloride    sodium chloride    zolpidem    ALLERGIES:  Losartan    ROS:  The following systems were reviewed and negative;   Constitution:  No fevers, chills, no unintentional weight loss  Skin: no rash, no jaundice  Eyes:  No blurry vision, no eye pain  HENT:  No change in hearing or smell  Resp:  No dyspnea or cough  CV:  No " "chest pain or palpitations  :  No dysuria, hematuria  Musculoskeletal:  No leg cramps or arthralgias  Neuro:  No tremor, no numbness  Psych:  No depression or confusion    Objective     Vital Signs:   Vitals:    01/08/25 2324 01/09/25 0102 01/09/25 0419 01/09/25 0826   BP: 145/86 146/65 139/86 120/69   BP Location: Right arm Right arm Right arm Right arm   Patient Position: Lying Lying Lying Lying   Pulse:  70 76 73   Resp: 18 22 20 20   Temp: 98.7 °F (37.1 °C) 98.7 °F (37.1 °C) 98 °F (36.7 °C) 97.9 °F (36.6 °C)   TempSrc: Oral Oral Oral Oral   SpO2:  98% 98% 98%   Weight:  69.9 kg (154 lb)     Height: 167.6 cm (65.98\") 167.6 cm (65.98\")         Physical Exam:       General Appearance:    Awake and alert, in no acute distress   Head:    Normocephalic, without obvious abnormality, atraumatic   Throat:   No oral lesions, no thrush, oral mucosa moist   Lungs:     Respirations regular, even and unlabored   Chest Wall:    No abnormalities observed   Abdomen:     Soft, non-tender, no rebound or guarding, non-distended   Rectal:     Deferred           Skin:   No rash, no jaundice, normal palpation               Results Review:   I reviewed the patient's labs and imaging.  Results from last 7 days   Lab Units 01/08/25 1937   WBC 10*3/mm3 12.95*   HEMOGLOBIN g/dL 16.8*   PLATELETS 10*3/mm3 371     Results from last 7 days   Lab Units 01/08/25  1937   SODIUM mmol/L 134*   POTASSIUM mmol/L 4.5   CHLORIDE mmol/L 92*   CO2 mmol/L 18.1*   BUN mg/dL 11   CREATININE mg/dL 1.10*   GLUCOSE mg/dL 120*   ALBUMIN g/dL 5.1   BILIRUBIN mg/dL 0.6   ALK PHOS U/L 147*   AST (SGOT) U/L 42*   ALT (SGPT) U/L 17   LIPASE U/L 37     Estimated Creatinine Clearance: 60 mL/min (A) (by C-G formula based on SCr of 1.1 mg/dL (H)).  Lab Results   Component Value Date    HGBA1C 5.32 01/09/2025    HGBA1C 5.6 09/23/2022    HGBA1C 5.4 06/08/2022         Infection     UA    Results from last 7 days   Lab Units 01/08/25 2017   NITRITE UA  Negative "     Microbiology Results (last 10 days)       Procedure Component Value - Date/Time    Respiratory Panel PCR w/COVID-19(SARS-CoV-2) SRIKANTH/JORGE/ISABEL/PAD/COR/CON In-House, NP Swab in UTM/VTM, 2 HR TAT - Swab, Nasopharynx [[id number]]  (Normal) Collected: 01/09/25 0000    Lab Status: Final result Specimen: Swab from Nasopharynx Updated: 01/09/25 0238     ADENOVIRUS, PCR Not Detected     Coronavirus 229E Not Detected     Coronavirus HKU1 Not Detected     Coronavirus NL63 Not Detected     Coronavirus OC43 Not Detected     COVID19 Not Detected     Human Metapneumovirus Not Detected     Human Rhinovirus/Enterovirus Not Detected     Influenza A PCR Not Detected     Influenza B PCR Not Detected     Parainfluenza Virus 1 Not Detected     Parainfluenza Virus 2 Not Detected     Parainfluenza Virus 3 Not Detected     Parainfluenza Virus 4 Not Detected     RSV, PCR Not Detected     Bordetella pertussis pcr Not Detected     Bordetella parapertussis PCR Not Detected     Chlamydophila pneumoniae PCR Not Detected     Mycoplasma pneumo by PCR Not Detected    Narrative:      In the setting of a positive respiratory panel with a viral infection PLUS a negative procalcitonin without other underlying concern for bacterial infection, consider observing off antibiotics or discontinuation of antibiotics and continue supportive care. If the respiratory panel is positive for atypical bacterial infection (Bordetella pertussis, Chlamydophila pneumoniae, or Mycoplasma pneumoniae), consider antibiotic de-escalation to target atypical bacterial infection.          Imaging Results (Last 72 Hours)       Procedure Component Value Units Date/Time    US Abdomen Limited [[id number]] Collected: 01/09/25 0601     Updated: 01/09/25 0607    Narrative:      US ABDOMEN LIMITED    Date of Exam: 1/9/2025 5:46 AM EST    Indication: pain.    Comparison: CT abdomen pelvis January 8, 2025    Technique: Grayscale and color Doppler ultrasound evaluation of the right upper  quadrant was performed.      Findings:  The liver is homogeneous. There are no focal liver lesions. Portal venous and hepatic venous flows are normal.    There is nonshadowing echogenic material in the gallbladder consistent with sludge. There is gallbladder wall thickening. There is a small amount of pericholecystic fluid. There is mild extrahepatic biliary ductal dilatation to approximately 12 mm.    The visualized pancreatic tissue is normal. The right kidney is 11 cm in [id number] length and there is no hydronephrosis.      Impression:      Impression:  Gallbladder sludge with gallbladder wall thickening and pericholecystic fluid. There is extrahepatic biliary ductal dilatation.            Electronically Signed: Nathen Rodarte MD    1/9/2025 6:05 AM EST    Workstation ID: [id number]    CT Abdomen Pelvis With Contrast [[id number]] Collected: 01/08/25 2057     Updated: 01/08/25 2107    Narrative:      CT ABDOMEN PELVIS W CONTRAST    Date of Exam: 1/8/2025 8:39 PM EST    Indication: Upper abdominal pain and chest pain, previous gastric bypass surgery.    Comparison: None available.    Technique: Axial CT images were obtained of the abdomen and pelvis following the uneventful intravenous administration of iodinated contrast. Sagittal and coronal reconstructions were performed.  Automated exposure control and iterative reconstruction   methods were used.        Findings:  There is abnormal dilatation of the common hepatic and common bile duct measuring up to 1.5 cm in diameter. There is no definite choledocholithiasis. There is no abnormal dilatation of the pancreatic duct. The gallbladder is unremarkable. I would   recommend correlation with liver enzyme levels. The liver, adrenal glands, pancreas, kidneys, and spleen are normal. The uterus is heterogeneous suggestive of small fibroids. The adnexal structures and urinary bladder are normal. The patient has had   previous gastric bypass surgery. The appendix is normal.  There are no dilated loops of bowel to indicate an obstructive process. There is no abnormal bowel wall thickening. There are scattered atherosclerotic vascular calcifications. There are no   enlarged lymph nodes or abnormal fluid collections. The patient has bilateral breast implants in place. The lung bases are clear. There is grade 1 anterior spondylolisthesis of L5 on S1 secondary to chronic pars defects. The subluxation measures   approximately 1.1 cm. There are no suspicious osteolytic or sclerotic lesions in the bony structures. There are underlying degenerative changes.      Impression:      Impression:  1.Abnormal dilatation of the common hepatic and common bile duct measuring up to 1.5 cm in diameter. There is no definite choledocholithiasis. I would recommend correlation with liver enzyme levels.  2.Small uterine fibroids.  3.Grade 1 anterior spondylolisthesis of L5 on S1 secondary to chronic pars defects.  4.Additional findings as noted above.        Electronically Signed: Margarito Damon MD    1/8/2025 9:05 PM EST    Workstation ID: [id number]            ASSESSMENT AND PLAN:    59-year-old female with history of gastric bypass by Dr. Ty in 2008.  GI was consulted for dilated CBD after patient presented to hospital via EMS for epigastric pain, nausea and vomiting.  History corresponds with cholecystitis so we will consult general surgery, consider EGD to rule out PUD if cholecystectomy is not warranted as patient takes daily meloxicam.    Epigastric Pain - suspect 2/2 cholecystitis. Ultrasound shows GB thickening and pericholecystic fluid. General surgery consult for ccy consideration. If ccy not recommended, may consider EGD to rule out PUD as patient takes daily meloxicam. Start pantoprazole BID.   Nausea and vomiting - as above. Continue zofran.   CBD dilatation - AST 42, alk phos 147 o/w normal LFTs. Recheck CMP in the morning.   Leukocytosis - WBC 12.95, on rocephin and flagyl.  S/p gastric  bypass  DM2 on Ozempic    I discussed the patients findings and my recommendations with the patient.    We appreciate the referral    Electronically signed by Adam Saucedo PA-C, 01/09/25, 9:34 AM EST.

## 2025-01-09 NOTE — PLAN OF CARE
Problem: Sepsis/Septic Shock  Goal: Absence of Infection Signs and Symptoms  Intervention: Initiate Sepsis Management  Recent Flowsheet Documentation  Taken 1/8/2025 2329 by Annelise Oliveira RN  Infection Prevention:   rest/sleep promoted   single patient room provided  Isolation Precautions:   contact   droplet  Intervention: Promote Recovery  Recent Flowsheet Documentation  Taken 1/8/2025 2329 by Annelise Oliveira RN  Activity Management:   up ad morales   up to bedside commode     Problem: Adult Inpatient Plan of Care  Goal: Absence of Hospital-Acquired Illness or Injury  Intervention: Identify and Manage Fall Risk  Recent Flowsheet Documentation  Taken 1/8/2025 2329 by Annelise Oliveira RN  Safety Promotion/Fall Prevention:   safety round/check completed   nonskid shoes/slippers when out of bed   lighting adjusted  Intervention: Prevent Skin Injury  Recent Flowsheet Documentation  Taken 1/8/2025 2329 by Annelise Oliveira RN  Body Position: supine  Intervention: Prevent Infection  Recent Flowsheet Documentation  Taken 1/8/2025 2329 by Annelise Oliveira RN  Infection Prevention:   rest/sleep promoted   single patient room provided  Goal: Optimal Comfort and Wellbeing  Intervention: Provide Person-Centered Care  Recent Flowsheet Documentation  Taken 1/8/2025 2329 by Annelise Oliveira RN  Trust Relationship/Rapport: care explained  Goal: Readiness for Transition of Care  Intervention: Mutually Develop Transition Plan  Recent Flowsheet Documentation  Taken 1/8/2025 2332 by Annelise Oliveira RN  Equipment Currently Used at Home: cpap  Taken 1/8/2025 2331 by Annelise Oliveira RN  Equipment Currently Used at Home:   none   cpap  Taken 1/8/2025 2330 by Annelise Oliveira RN  Transportation Anticipated: family or friend will provide  Patient/Family Anticipated Services at Transition: none  Patient/Family Anticipates Transition to: home   Goal Outcome Evaluation:

## 2025-01-09 NOTE — PLAN OF CARE
Problem: Sepsis/Septic Shock  Goal: Optimal Coping  Outcome: Progressing   Goal Outcome Evaluation:

## 2025-01-09 NOTE — PROGRESS NOTES
[facility] MEDICINE SERVICE  DAILY PROGRESS NOTE    NAME: Alecia Hunter  : 1965  MRN: [id number]      LOS: 1 day     PROVIDER OF SERVICE: Michoacano Osuna MD    Chief Complaint: Cholecystitis    Subjective:     Interval History:  History taken from: patient    Better controlled pain wise        Review of Systems:   Review of Systems    Objective:     Vital Signs  Temp:  [97.4 °F (36.3 °C)-98.7 °F (37.1 °C)] 97.4 °F (36.3 °C)  Heart Rate:  [] 74  Resp:  [12-25] 20  BP: (120-204)/(65-88) 123/82  Flow (L/min) (Oxygen Therapy):  [2] 2   Body mass index is 24.87 kg/m².    Physical Exam  Physical Exam  Constitutional:       Appearance: Normal appearance.   Cardiovascular:      Rate and Rhythm: Normal rate and regular rhythm.      Pulses: Normal pulses.      Heart sounds: Normal heart sounds.   Pulmonary:      Effort: Pulmonary effort is normal.      Breath sounds: Normal breath sounds.   Abdominal:      Comments: Epigastric abdominal pain   Neurological:      General: No focal deficit present.      Mental Status: She is alert.            Diagnostic Data    Results from last 7 days   Lab Units 25  1937   WBC 10*3/mm3 12.95*   HEMOGLOBIN g/dL 16.8*   HEMATOCRIT % 49.2*   PLATELETS 10*3/mm3 371   GLUCOSE mg/dL 120*   CREATININE mg/dL 1.10*   BUN mg/dL 11   SODIUM mmol/L 134*   POTASSIUM mmol/L 4.5   AST (SGOT) U/L 42*   ALT (SGPT) U/L 17   ALK PHOS U/L 147*   BILIRUBIN mg/dL 0.6   ANION GAP mmol/L 23.9*       US Abdomen Limited    Result Date: 2025  Impression: Gallbladder sludge with gallbladder wall thickening and pericholecystic fluid. There is extrahepatic biliary ductal dilatation. Electronically Signed: Nathen Rodarte MD  2025 6:05 AM EST  Workstation ID: [id number]    CT Abdomen Pelvis With Contrast    Result Date: 2025  Impression: 1.Abnormal dilatation of the common hepatic and common bile duct measuring up to 1.5 cm in diameter. There is no definite  choledocholithiasis. I would recommend correlation with liver enzyme levels. 2.Small uterine fibroids. 3.Grade 1 anterior spondylolisthesis of L5 on S1 secondary to chronic pars defects. 4.Additional findings as noted above. Electronically Signed: Margarito Damon MD  1/8/2025 9:05 PM EST  Workstation ID: [id number]       I reviewed the patient's new clinical results.    Assessment/Plan:     Active and Resolved Problems  Active Hospital Problems    Diagnosis  POA    **Cholecystitis [K81.9]  Yes    Lactic acidosis [E87.20]  Yes    SHABANA (obstructive sleep apnea) [G47.33]  Yes    Hypertension [I10]  Yes    Gastric bypass status for obesity [Z98.84]  Not Applicable      Resolved Hospital Problems   No resolved problems to display.       Acute cholecystitis?  -General Surgery consulted for consideration of cholecystectomy versus GI with ERCP.  -Continue antibiotics  -Pain control  -Diet per general surgery    Essential hypertension  -Continue home meds    SHABANA  -Use CPAP    History of gastric bypass surgery    VTE Prophylaxis:  Mechanical VTE prophylaxis orders are present.             Disposition Planning:     Barriers to Discharge: Surgical evaluation  Anticipated Date of Discharge: 1/11/2025  Place of Discharge: Home      Time: 30 minutes     Code Status and Medical Interventions: CPR (Attempt to Resuscitate); Full Support   Ordered at: 01/08/25 2837     Level Of Support Discussed With:    Patient     Code Status (Patient has no pulse and is not breathing):    CPR (Attempt to Resuscitate)     Medical Interventions (Patient has pulse or is breathing):    Full Support       Signature: Electronically signed by Michoacano Osuna MD, 01/09/25, 14:07 EST.  [facility] Hospitalist Team

## 2025-01-09 NOTE — H&P
[facility] Medicine Services  History & Physical    Patient Name: Alecia Hunter  : 1965  MRN: [id number]  Primary Care Physician:  Margarito Monroe MD  Date of admission: 2025  Date and Time of Service: 2025 at 21:30    Subjective      Chief Complaint: Abdominal pain    History of Present Illness: Alecia Hunter is a 59 y.o. female with a past medical history of gastric bypass surgery, obstructive sleep apnea, hypothyroid, questionable diabetes, obstructive sleep apnea, hypertension, and obesity.  The patient was her usual state of health until earlier today she started having some severe abdominal pain was upper epigastric and on her left side radiated to her back and between her shoulder blades and then also went into the upper epigastric area.  The pain was severe.  And then she had a significant bouts of vomiting.  This is whenever it was decided to have EMS bring her to the emergency room.  The patient in the emergency room had a mild leukocytosis and an elevated lactic acid close a dilated common bile duct but no stone was seen.  There was some question about whether there was some inflammation or any signs of cholecystitis.  The patient was given fluids and started empirically on ceftriaxone and Flagyl.  Pain has improved she has not had any further nausea and her lactic acid has returned to normal.  A right upper quadrant ultrasound has been ordered to further evaluate for potential stones.  And will have GI evaluate the patient tomorrow.  So concerns for possible cholecystitis with an elevated lactic acid appears to be improving over the further evaluation and tomorrow will determine the course of action.  What complicates her GI system is the fact that she has had bypass surgery so uncertain that the common bile duct may be dilated on a normal basis.    Review of Systems  Abdominal pain, nausea, vomiting, denies fever, chills, upper epigastric pain  radiating from the gut, denies shortness of breath, swelling, rash, unusual joint pain, diarrhea, headache, altered mental status, changes in vision, and the rest 15 essential review of systems have been reviewed and are negative  Personal History     Past Medical History:   Diagnosis Date    Constipation 2016    Depression 2016    Disease of thyroid gland     History of colon polyps     History of transfusion     Hypertension     Preop examination 2021    Screening for diabetes mellitus 2018    Walking pneumonia     Weight gain 2016       Past Surgical History:   Procedure Laterality Date    AUGMENTATION MAMMAPLASTY Bilateral     ssaline     SECTION      COLONOSCOPY  2016    LEC    COLONOSCOPY N/A 2023    Procedure: COLONOSCOPY INTO TERMINAL ILEUM WITH POLYPECTOMY (COLD SNARE)and  (HOT);  Surgeon: Reva Malik MD;  Location: [facility] ENDOSCOPY;  Service: Gastroenterology;  Laterality: N/A;  PREOP/ SCREENING- POSTOP/ POLYPS, HEMORRHOIDS    GASTRIC BYPASS      HERNIA REPAIR      OOPHORECTOMY Left     REDUCTION MAMMAPLASTY Bilateral        Family History: family history includes COPD in her mother; Cancer in her father; Colon cancer in her father; Heart attack (age of onset: 62) in her father. Otherwise pertinent FHx was reviewed and not pertinent to current issue.    Social History:  reports that she has never smoked. She has been exposed to tobacco smoke. She has never used smokeless tobacco. She reports current alcohol use of about 8.0 standard drinks of alcohol per week. She reports that she does not use drugs.    Home Medications:  Prior to Admission Medications       Prescriptions Last Dose Informant Patient Reported? Taking?    amLODIPine (NORVASC) 2.5 MG tablet 2025  No Yes    Take 1 tablet by mouth Daily.    aspirin 81 MG EC tablet 2025  No Yes    Take 1 tablet by mouth Daily.    Patient taking differently:  Take 1 tablet by mouth Daily. Three  "times a week    cholecalciferol (VITAMIN D3) 25 MCG (1000 UT) tablet 1/7/2025  Yes Yes    Take 2 tablets by mouth Daily.    cyanocobalamin 1000 MCG/ML injection Past Week  No Yes    Inject 1 mL into the appropriate muscle as directed by prescriber 1 (One) Time Per Week.    estradiol (CLIMARA) 0.05 MG/24HR patch 1/7/2025  Yes Yes    Place 1 patch on the skin as directed by provider.    hydrOXYzine (ATARAX) 25 MG tablet 1/8/2025  Yes Yes    Take 1 tablet by mouth.    levothyroxine (SYNTHROID, LEVOTHROID) 25 MCG tablet 1/7/2025  No Yes    TAKE 1 TABLET BY MOUTH DAILY    meloxicam (MOBIC) 15 MG tablet Past Week  Yes Yes    Take 1 tablet by mouth Daily.    metoprolol succinate XL (TOPROL-XL) 100 MG 24 hr tablet 1/7/2025  No Yes    Take 1 tablet by mouth Daily.    Progesterone (PROMETRIUM) 200 MG capsule 1/7/2025  Yes Yes    Take 1 capsule by mouth Daily.    Semaglutide,0.25 or 0.5MG/DOS, (Ozempic, 0.25 or 0.5 MG/DOSE,) 2 MG/1.5ML solution pen-injector Past Week  Yes Yes    INJECT 0.25MG INTO THE SKIN ONCE WEEKLY X 4 DOSES THEN 0.5MG WEEKLY    Semaglutide-Weight Management 0.5 MG/0.5ML solution auto-injector Past Week  Yes Yes    Inject 0.5 mL under the skin into the appropriate area as directed 1 (One) Time Per Week.    Syringe/Needle, Disp, 23G X 1\" 3 ML misc Past Week  No Yes    Use to inject vitamin b12 weekly    traMADol ER (ULTRAM-ER) 200 MG 24 hr tablet 1/7/2025  Yes Yes    Take 1 tablet by mouth Daily.    valACYclovir (Valtrex) 500 MG tablet 1/7/2025  No Yes    Take 1 tablet by mouth Daily.    Patient taking differently:  Take 1 tablet by mouth Daily. PRN    zolpidem (AMBIEN) 5 MG tablet 1/7/2025  Yes Yes    Take 1 tablet by mouth At Night As Needed. As needed    amLODIPine (NORVASC) 5 MG tablet   Yes No    Take 1 tablet by mouth Daily.    busPIRone (BUSPAR) 5 MG tablet   No No    Take 2 tablets by mouth 3 (Three) Times a Day.    Patient taking differently:  Take 2 tablets by mouth 3 (Three) Times a Day As Needed. " As needed    Misc Natural Products (Airborne Elderberry) chewable tablet   Yes No    Chew.              Allergies:  Allergies   Allergen Reactions    Losartan Other (See Comments)     ZACKARY 10/2023       Objective      Vitals:   Temp:  [97.8 °F (36.6 °C)-98.7 °F (37.1 °C)] 98.7 °F (37.1 °C)  Heart Rate:  [] 93  Resp:  [12-25] 18  BP: (137-204)/(84-88) 145/86  Body mass index is 28.89 kg/m².  Physical Exam  General No apparent distress obese female  Head atraumatic normocephalic  Eyes pupils equal round reactive light ocular motion intact  Nares no discharge  Oropharynx membranes moist no erythema  Neck no thyromegaly lymphadenopathy  Pulmonary lungs clear to auscultation bilateral no accessory muscle use   Abdomen positive bowel sounds no guarding no rebound no positive Beckman sign no hepatosplenomegaly  Extremities symmetric warm to the touch no cyanosis or edema  No rash  Psych patient is alert and oriented x 4  Derm no rash  Cardiac regular rate and rhythm could not appreciate any murmurs  Neuro cranial nerves II through XII intact able to move all extremities no obvious focal neurologic deficit    Diagnostic Data:  Lab Results (last 24 hours)       Procedure Component Value Units Date/Time    STAT Lactic Acid, Reflex [[id number]]  (Normal) Collected: 01/08/25 2239    Specimen: Blood Updated: 01/08/25 2300     Lactate 0.9 mmol/L     POC Lactate [[id number]]  (Abnormal) Collected: 01/08/25 1945    Specimen: Blood Updated: 01/08/25 2026     Lactate 3.2 mmol/L      Comment: Serial Number: [id number]:  [id number]       Comprehensive Metabolic Panel [[id number]]  (Abnormal) Collected: 01/08/25 1937    Specimen: Blood Updated: 01/08/25 2025     Glucose 120 mg/dL      BUN 11 mg/dL      Creatinine 1.10 mg/dL      Sodium 134 mmol/L      Potassium 4.5 mmol/L      Comment: Slight hemolysis detected by analyzer. Result may be falsely elevated.        Chloride 92 mmol/L      CO2 18.1 mmol/L      Calcium 10.9 mg/dL       Total Protein 9.5 g/dL      Albumin 5.1 g/dL      ALT (SGPT) 17 U/L      AST (SGOT) 42 U/L      Comment: Slight hemolysis detected by analyzer. Result may be falsely elevated.        Alkaline Phosphatase 147 U/L      Total Bilirubin 0.6 mg/dL      Globulin 4.4 gm/dL      A/G Ratio 1.2 g/dL      BUN/Creatinine Ratio 10.0     Anion Gap 23.9 mmol/L      eGFR 57.6 mL/min/1.73     Narrative:      GFR Categories in Chronic Kidney Disease (CKD)      GFR Category          GFR (mL/min/1.73)    Interpretation  G1                     90 or greater         Normal or high (1)  G2                      60-89                Mild decrease (1)  G3a                   45-59                Mild to moderate decrease  G3b                   30-44                Moderate to severe decrease  G4                    15-29                Severe decrease  G5                    14 or less           Kidney failure          (1)In the absence of evidence of kidney disease, neither GFR category G1 or G2 fulfill the criteria for CKD.    eGFR calculation 2021 CKD-EPI creatinine equation, which does not include race as a factor    Urinalysis With Culture If Indicated - Urine, Clean Catch [[id number]]  (Abnormal) Collected: 01/08/25 2017    Specimen: Urine, Clean Catch Updated: 01/08/25 2023     Color, UA Yellow     Appearance, UA Clear     pH, UA 5.5     Specific Gravity, UA 1.013     Glucose, UA Negative     Ketones, UA Trace     Bilirubin, UA Negative     Blood, UA Negative     Protein, UA Negative     Leuk Esterase, UA Negative     Nitrite, UA Negative     Urobilinogen, UA 1.0 E.U./dL    Narrative:      In absence of clinical symptoms, the presence of pyuria, bacteria, and/or nitrites on the urinalysis result does not correlate with infection.  Urine microscopic not indicated.    Blood Culture - Blood, Hand, Left [[id number]] Collected: 01/08/25 2010    Specimen: Blood from Hand, Left Updated: 01/08/25 2019    Lipase [[id number]]  (Normal) Collected:  01/08/25 1937    Specimen: Blood Updated: 01/08/25 2004     Lipase 37 U/L     [place] Draw [[id number]] Collected: 01/08/25 1937    Specimen: Blood Updated: 01/08/25 1946    Narrative:      The following orders were created for panel order [place] Draw.  Procedure                               Abnormality         Status                     ---------                               -----------         ------                     Green Top (Gel)[[id number]]                                  Final result               Lavender Top[[id number]]                                     Final result               Gold Top - SST[[id number]]                                   Final result               Light Blue Top[[id number]]                                   Final result                 Please view results for these tests on the individual orders.    Green Top (Gel) [[id number]] Collected: 01/08/25 1937    Specimen: Blood Updated: 01/08/25 1946     Extra Tube Hold for add-ons.     Comment: Auto resulted.       Lavender Top [[id number]] Collected: 01/08/25 1937    Specimen: Blood Updated: 01/08/25 1946     Extra Tube hold for add-on     Comment: Auto resulted       Gold Top - SST [[id number]] Collected: 01/08/25 1937    Specimen: Blood Updated: 01/08/25 1946     Extra Tube Hold for add-ons.     Comment: Auto resulted.       Light Blue Top [[id number]] Collected: 01/08/25 1937    Specimen: Blood Updated: 01/08/25 1946     Extra Tube Hold for add-ons.     Comment: Auto resulted       CBC & Differential [[id number]]  (Abnormal) Collected: 01/08/25 1937    Specimen: Blood Updated: 01/08/25 1939    Narrative:      The following orders were created for panel order CBC & Differential.  Procedure                               Abnormality         Status                     ---------                               -----------         ------                     CBC Auto Differential[[id number]]        Abnormal            Final result                  Please view results for these tests on the individual orders.    CBC Auto Differential [[id number]]  (Abnormal) Collected: 01/08/25 1937    Specimen: Blood Updated: 01/08/25 1939     WBC 12.95 10*3/mm3      RBC 5.39 10*6/mm3      Hemoglobin 16.8 g/dL      Hematocrit 49.2 %      MCV 91.3 fL      MCH 31.2 pg      MCHC 34.1 g/dL      RDW 13.3 %      RDW-SD 44.9 fl      MPV 9.0 fL      Platelets 371 10*3/mm3      Neutrophil % 68.4 %      Lymphocyte % 23.4 %      Monocyte % 7.3 %      Eosinophil % 0.2 %      Basophil % 0.4 %      Immature Grans % 0.3 %      Neutrophils, Absolute 8.86 10*3/mm3      Lymphocytes, Absolute 3.03 10*3/mm3      Monocytes, Absolute 0.94 10*3/mm3      Eosinophils, Absolute 0.03 10*3/mm3      Basophils, Absolute 0.05 10*3/mm3      Immature Grans, Absolute 0.04 10*3/mm3      nRBC 0.0 /100 WBC     Blood Culture - Blood, Arm, Left [[id number]] Collected: 01/08/25 1936    Specimen: Blood from Arm, Left Updated: 01/08/25 1936             Imaging Results (Last 24 Hours)       Procedure Component Value Units Date/Time    CT Abdomen Pelvis With Contrast [[id number]] Collected: 01/08/25 2057     Updated: 01/08/25 2107    Narrative:      CT ABDOMEN PELVIS W CONTRAST    Date of Exam: 1/8/2025 8:39 PM EST    Indication: Upper abdominal pain and chest pain, previous gastric bypass surgery.    Comparison: None available.    Technique: Axial CT images were obtained of the abdomen and pelvis following the uneventful intravenous administration of iodinated contrast. Sagittal and coronal reconstructions were performed.  Automated exposure control and iterative reconstruction   methods were used.        Findings:  There is abnormal dilatation of the common hepatic and common bile duct measuring up to 1.5 cm in diameter. There is no definite choledocholithiasis. There is no abnormal dilatation of the pancreatic duct. The gallbladder is unremarkable. I would   recommend correlation with liver enzyme levels. The  liver, adrenal glands, pancreas, kidneys, and spleen are normal. The uterus is heterogeneous suggestive of small fibroids. The adnexal structures and urinary bladder are normal. The patient has had   previous gastric bypass surgery. The appendix is normal. There are no dilated loops of bowel to indicate an obstructive process. There is no abnormal bowel wall thickening. There are scattered atherosclerotic vascular calcifications. There are no   enlarged lymph nodes or abnormal fluid collections. The patient has bilateral breast implants in place. The lung bases are clear. There is grade 1 anterior spondylolisthesis of L5 on S1 secondary to chronic pars defects. The subluxation measures   approximately 1.1 cm. There are no suspicious osteolytic or sclerotic lesions in the bony structures. There are underlying degenerative changes.      Impression:      Impression:  1.Abnormal dilatation of the common hepatic and common bile duct measuring up to 1.5 cm in diameter. There is no definite choledocholithiasis. I would recommend correlation with liver enzyme levels.  2.Small uterine fibroids.  3.Grade 1 anterior spondylolisthesis of L5 on S1 secondary to chronic pars defects.  4.Additional findings as noted above.        Electronically Signed: Margarito Damon MD    1/8/2025 9:05 PM EST    Workstation ID: [id number]              Assessment & Plan        This is a 59 y.o. female with:    Active and Resolved Problems  Active Hospital Problems    Diagnosis  POA    **Cholecystitis [K81.9]  Yes     Priority: High    Lactic acidosis [E87.20]  Yes     Priority: High    SHABANA (obstructive sleep apnea) [G47.33]  Yes    Hypertension [I10]  Yes    Gastric bypass status for obesity [Z98.84]  Not Applicable      Resolved Hospital Problems   No resolved problems to display.       1.  Cholecystitis uncertain of this diagnosis but she did have a lactic acid and elevated white count and a dilated common bile duct empirically placed her on  ceftriaxone and Flagyl.  Right upper quadrant ultrasound is pending and GI consult placed  1.  Lactic acid resolved  2.  Essential hypertension continue amlodipine and metoprolol  3.  Obstructive sleep apnea ordered CPAP  4.  Gastric bypass complicates the evaluation for her gallbladder disease    Of note she was complaining of some chest pain however she had a stress test and a heart catheterization in January 2024 therefore do not believe this is cardiac in nature    VTE Prophylaxis:  Mechanical VTE prophylaxis orders are present.        The patient desires to be as follows:    CODE STATUS:    Level Of Support Discussed With: Patient  Code Status (Patient has no pulse and is not breathing): CPR (Attempt to Resuscitate)  Medical Interventions (Patient has pulse or is breathing): Full Support      Admission Status:  I believe this patient meets observation status.    Expected Length of Stay: 1 to 2 days    PDMP and Medication Dispenses via Sidebar reviewed and consistent with patient reported medications.    I discussed the patient's findings and my recommendations with patient.      Signature:     This document has been electronically signed by Delvin Ramsey MD on January 8, 2025 23:28 [facility] Hospitalist Team

## 2025-01-09 NOTE — ED PROVIDER NOTES
Subjective   History of Present Illness  59-year-old female presents with epigastric pain edema gradually.  States she had eaten a hot dog about 2 hours prior to symptom onset.  She reports nausea and 1 episode of small vomitus.  She reports no fevers or chills or diarrhea or constipation.  Review of Systems    Past Medical History:   Diagnosis Date    Constipation 2016    Depression 2016    Disease of thyroid gland     History of colon polyps     History of transfusion     Hypertension     Preop examination 2021    Screening for diabetes mellitus 2018    Walking pneumonia     Weight gain 2016       Allergies   Allergen Reactions    Losartan Other (See Comments)     ZACKARY 10/2023       Past Surgical History:   Procedure Laterality Date    AUGMENTATION MAMMAPLASTY Bilateral     ssaline     SECTION      COLONOSCOPY      LEC    COLONOSCOPY N/A 2023    Procedure: COLONOSCOPY INTO TERMINAL ILEUM WITH POLYPECTOMY (COLD SNARE)and  (HOT);  Surgeon: Reva Malik MD;  Location: [facility];  Service: Gastroenterology;  Laterality: N/A;  PREOP/ SCREENING- POSTOP/ POLYPS, HEMORRHOIDS    GASTRIC BYPASS      HERNIA REPAIR      OOPHORECTOMY Left     REDUCTION MAMMAPLASTY Bilateral        Family History   Problem Relation Age of Onset    COPD Mother     Cancer Father         colon cancer    Colon cancer Father     Heart attack Father 62    Malig Hyperthermia Neg Hx        Social History     Socioeconomic History    Marital status:    Tobacco Use    Smoking status: Never     Passive exposure: Past (Parents smoked in home)    Smokeless tobacco: Never    Tobacco comments:     NO CAFFEINE    Vaping Use    Vaping status: Never Used   Substance and Sexual Activity    Alcohol use: Yes     Alcohol/week: 8.0 standard drinks of alcohol     Types: 6 Glasses of wine, 2 Drinks containing 0.5 oz of alcohol per week     Comment: socially    Drug use: Never    Sexual activity:  Defer       Prior to Admission medications    Medication Sig Start Date End Date Taking? Authorizing Provider   amLODIPine (NORVASC) 2.5 MG tablet Take 1 tablet by mouth Daily. 7/12/24   Reva Kitchen APRN   amLODIPine (NORVASC) 5 MG tablet Take 1 tablet by mouth Daily.    Aleyda Hutton MD   aspirin 81 MG EC tablet Take 1 tablet by mouth Daily.  Patient taking differently: Take 1 tablet by mouth Daily. Three times a week 2/6/24   Tina Del Valle APRN   busPIRone (BUSPAR) 5 MG tablet Take 2 tablets by mouth 3 (Three) Times a Day.  Patient taking differently: Take 2 tablets by mouth 3 (Three) Times a Day As Needed. As needed 6/22/22   Jackie Butler APRN   cholecalciferol (VITAMIN D3) 25 MCG (1000 UT) tablet Take 2 tablets by mouth Daily.    Aleyda Hutton MD   cyanocobalamin 1000 MCG/ML injection Inject 1 mL into the appropriate muscle as directed by prescriber 1 (One) Time Per Week. 2/6/24   Tina Del Valle APRN   estradiol (CLIMARA) 0.05 MG/24HR patch Place 1 patch on the skin as directed by provider. 12/3/24   Aleyda Hutton MD   hydrOXYzine (ATARAX) 25 MG tablet Take 1 tablet by mouth. 11/19/24   Aleyda Hutton MD   levothyroxine (SYNTHROID, LEVOTHROID) 25 MCG tablet TAKE 1 TABLET BY MOUTH DAILY 9/16/22   Jackie Butler APRN   meloxicam (MOBIC) 15 MG tablet Take 1 tablet by mouth Daily. 6/17/24   Aleyda Hutton MD   metoprolol succinate XL (TOPROL-XL) 100 MG 24 hr tablet Take 1 tablet by mouth Daily. 7/12/24   Reva Kitchen APRN   Mis Natural Products (Airborne Elderberry) chewable tablet Chew.    Aleyda Hutton MD   Progesterone (PROMETRIUM) 200 MG capsule Take 1 capsule by mouth Daily. 12/3/24   Aleyda Hutton MD   Semaglutide,0.25 or 0.5MG/DOS, (Ozempic, 0.25 or 0.5 MG/DOSE,) 2 MG/1.5ML solution pen-injector INJECT 0.25MG INTO THE SKIN ONCE WEEKLY X 4 DOSES THEN 0.5MG WEEKLY    Provider, MD Aleyda   Semaglutide-Weight Management 0.5 MG/0.5ML  "solution auto-injector Inject 0.5 mL under the skin into the appropriate area as directed 1 (One) Time Per Week.    Provider, MD Aleyda   Syringe/Needle, Disp, 23G X 1\" 3 ML misc Use to inject vitamin b12 weekly 9/3/21   Adan Campbell APRN   traMADol ER (ULTRAM-ER) 200 MG 24 hr tablet Take 1 tablet by mouth Daily.    Provider, MD Aleyda   valACYclovir (Valtrex) 500 MG tablet Take 1 tablet by mouth Daily.  Patient taking differently: Take 1 tablet by mouth Daily. PRN 9/3/21   Adan Campbell APRN   zolpidem (AMBIEN) 5 MG tablet Take 1 tablet by mouth At Night As Needed. As needed 1/19/24   ProviderAleyda MD     /84   Pulse 93   Temp 97.8 °F (36.6 °C) (Oral)   Resp 18   Ht 167.6 cm (66\")   SpO2 97%   BMI 28.89 kg/m²       Objective   Physical Exam  General: Well-developed female awake and alert, acute distress secondary to pain  Eyes:  sclera nonicteric  HEENT: Mucous membranes moist, no mucosal swelling  Neck: Supple, no nuchal rigidity,   Respirations: Respirations nonlabored, equal breath sounds bilaterally, clear lungs  Heart regular rate and rhythm, no murmurs rubs or gallops,   Abdomen soft, tender palpation the midepigastrium, no rebound or guarding, no Beckman sign, nondistended, no hepatosplenomegaly, no hernia, no mass, normal bowel sounds, no CVA tenderness  Extremities no clubbing cyanosis or edema, calves are symmetric and nontender  Neuro cranial nerves grossly intact, no focal limb deficits  Psych oriented, pleasant affect  Skin no rash, brisk cap refill  Procedures           ED Course  ED Course as of 01/08/25 2205 Wed Jan 08, 2025 2144 Findings discussed with Dr. Hobbs with the hospitalist service who is agreeable plan of admission.  He requests right upper quadrant ultrasound order at time of admission [SH]      ED Course User Index  [SH] Rodrigo Spencer MD      Results for orders placed or performed during the hospital encounter of 01/08/25   ECG 12 Lead Chest Pain "    Collection Time: 01/08/25  7:18 PM   Result Value Ref Range    QT Interval 278 ms    QTC Interval 368 ms   Comprehensive Metabolic Panel    Collection Time: 01/08/25  7:37 PM    Specimen: Blood   Result Value Ref Range    Glucose 120 (H) 65 - 99 mg/dL    BUN 11 8 - 23 mg/dL    Creatinine 1.10 (H) 0.57 - 1.00 mg/dL    Sodium 134 (L) 136 - 145 mmol/L    Potassium 4.5 3.5 - 5.2 mmol/L    Chloride 92 (L) 98 - 107 mmol/L    CO2 18.1 (L) 22.0 - 29.0 mmol/L    Calcium 10.9 (H) 8.6 - 10.5 mg/dL    Total Protein 9.5 (H) 6.0 - 8.5 g/dL    Albumin 5.1 3.5 - 5.2 g/dL    ALT (SGPT) 17 1 - 33 U/L    AST (SGOT) 42 (H) 1 - 32 U/L    Alkaline Phosphatase 147 (H) 39 - 117 U/L    Total Bilirubin 0.6 0.0 - 1.2 mg/dL    Globulin 4.4 gm/dL    A/G Ratio 1.2 g/dL    BUN/Creatinine Ratio 10.0 7.0 - 25.0    Anion Gap 23.9 (H) 5.0 - 15.0 mmol/L    eGFR 57.6 (L) >60.0 mL/min/1.73   CBC Auto Differential    Collection Time: 01/08/25  7:37 PM    Specimen: Blood   Result Value Ref Range    WBC 12.95 (H) 3.40 - 10.80 10*3/mm3    RBC 5.39 (H) 3.77 - 5.28 10*6/mm3    Hemoglobin 16.8 (H) 12.0 - 15.9 g/dL    Hematocrit 49.2 (H) 34.0 - 46.6 %    MCV 91.3 79.0 - 97.0 fL    MCH 31.2 26.6 - 33.0 pg    MCHC 34.1 31.5 - 35.7 g/dL    RDW 13.3 12.3 - 15.4 %    RDW-SD 44.9 37.0 - 54.0 fl    MPV 9.0 6.0 - 12.0 fL    Platelets 371 140 - 450 10*3/mm3    Neutrophil % 68.4 42.7 - 76.0 %    Lymphocyte % 23.4 19.6 - 45.3 %    Monocyte % 7.3 5.0 - 12.0 %    Eosinophil % 0.2 (L) 0.3 - 6.2 %    Basophil % 0.4 0.0 - 1.5 %    Immature Grans % 0.3 0.0 - 0.5 %    Neutrophils, Absolute 8.86 (H) 1.70 - 7.00 10*3/mm3    Lymphocytes, Absolute 3.03 0.70 - 3.10 10*3/mm3    Monocytes, Absolute 0.94 (H) 0.10 - 0.90 10*3/mm3    Eosinophils, Absolute 0.03 0.00 - 0.40 10*3/mm3    Basophils, Absolute 0.05 0.00 - 0.20 10*3/mm3    Immature Grans, Absolute 0.04 0.00 - 0.05 10*3/mm3    nRBC 0.0 0.0 - 0.2 /100 WBC   Lipase    Collection Time: 01/08/25  7:37 PM    Specimen: Blood   Result  Value Ref Range    Lipase 37 13 - 60 U/L   Green Top (Gel)    Collection Time: 01/08/25  7:37 PM   Result Value Ref Range    Extra Tube Hold for add-ons.    Lavender Top    Collection Time: 01/08/25  7:37 PM   Result Value Ref Range    Extra Tube hold for add-on    Gold Top - SST    Collection Time: 01/08/25  7:37 PM   Result Value Ref Range    Extra Tube Hold for add-ons.    Light Blue Top    Collection Time: 01/08/25  7:37 PM   Result Value Ref Range    Extra Tube Hold for add-ons.    POC Lactate    Collection Time: 01/08/25  7:45 PM    Specimen: Blood   Result Value Ref Range    Lactate 3.2 (C) 0.3 - 2.0 mmol/L   Urinalysis With Culture If Indicated - Urine, Clean Catch    Collection Time: 01/08/25  8:17 PM    Specimen: Urine, Clean Catch   Result Value Ref Range    Color, UA Yellow Yellow, Straw    Appearance, UA Clear Clear    pH, UA 5.5 5.0 - 8.0    Specific Gravity, UA 1.013 1.005 - 1.030    Glucose, UA Negative Negative    Ketones, UA Trace (A) Negative    Bilirubin, UA Negative Negative    Blood, UA Negative Negative    Protein, UA Negative Negative    Leuk Esterase, UA Negative Negative    Nitrite, UA Negative Negative    Urobilinogen, UA 1.0 E.U./dL 0.2 - 1.0 E.U./dL   ECG 12 Lead Other; pain    Collection Time: 01/08/25  9:34 PM   Result Value Ref Range    QT Interval 362 ms    QTC Interval 440 ms     CT Abdomen Pelvis With Contrast    Result Date: 1/8/2025  Impression: 1.Abnormal dilatation of the common hepatic and common bile duct measuring up to 1.5 cm in diameter. There is no definite choledocholithiasis. I would recommend correlation with liver enzyme levels. 2.Small uterine fibroids. 3.Grade 1 anterior spondylolisthesis of L5 on S1 secondary to chronic pars defects. 4.Additional findings as noted above. Electronically Signed: Margarito Damon MD  1/8/2025 9:05 PM EST  Workstation ID: [id number]                                                    Medical Decision Making  Differential diagnosis including  pancreatitis, cholecystitis, gastritis, perforation, peritonitis, bowel obstruction, ischemic bowel      Patient is ordered Dilaudid for acute pain management and Zofran for nausea.  On reexamination she states she was feeling much better her symptoms had diminished greatly.  We did discuss lab and CT findings.  She was ordered Rocephin and Flagyl for possible intra-abdominal infection possible biliary source.  Ischemic bowel felt to be unlikely she did not have pain out of proportion to exam.  Initial lactic acidosis however no hypotension.  She was ordered some IV fluids.  She was stable on the monitor on reexamination.  Vital signs normalized.  She is agreeable plan of admission for further evaluation and management.    Problems Addressed:  Common bile duct dilation: complicated acute illness or injury  Epigastric pain: complicated acute illness or injury    Amount and/or Complexity of Data Reviewed  Labs: ordered. Decision-making details documented in ED Course.     Details: Initial lactate elevated, urinalysis essentially negative, the CBC shows some mild leukocytosis, comprehensive metabolic panel shows borderline elevated AST, mild renal insufficiency  Radiology: ordered.  ECG/medicine tests: ordered and independent interpretation performed.     Details: My EKG interpretation sinus rhythm rate of 89, no acute ST or T wave abnormality, borderline first-degree AV block    Risk  Prescription drug management.  Decision regarding hospitalization.        Final diagnoses:   Epigastric pain   Common bile duct dilation       ED Disposition  ED Disposition       ED Disposition   Decision to Admit    Condition   --    Comment   Level of Care: Med/Surg [1]   Admitting Physician: IRINA COWAN [[id number]]   Attending Physician: IRINA COWAN [[id number]]                 No follow-up provider specified.       Medication List      No changes were made to your prescriptions during this visit.            Rodrigo Spencer,  MD  01/08/25 4689

## 2025-01-09 NOTE — ED NOTES
Nursing report ED to floor  Alecia Hunter  59 y.o.  female    HPI:   Chief Complaint   Patient presents with    Abdominal Pain       Admitting doctor:   Delvin KHALIL MD    Admitting diagnosis:   The primary encounter diagnosis was Epigastric pain. A diagnosis of Common bile duct dilation was also pertinent to this visit.    Code status:   Current Code Status       Date Active Code Status Order ID Comments User Context       1/8/2025 2147 CPR (Attempt to Resuscitate) [id number]  Delvin Ramsey MD ED        Question Answer    Code Status (Patient has no pulse and is not breathing) CPR (Attempt to Resuscitate)    Medical Interventions (Patient has pulse or is breathing) Full Support    Level Of Support Discussed With Patient                    Allergies:   Losartan    Isolation:  No active isolations     Fall Risk:  Fall Risk Assessment was completed, and patient is at low risk for falls.   Predictive Model Details         12 (Low) Factor Value    Calculated 1/8/2025 22:32 Age 59    Risk of Fall Model Active Peripheral IV Present     Imaging order in this encounter Present     Number of Distinct Medication Classes administered 7     Respiratory Rate 18     Magnesium not on file     Chloride 92 mmol/L     Anastacio Scale not on file     Creatinine 1.1 mg/dL     Number of administrations of Ulcer Drugs 1     Diastolic BP 84     Cardiac Assessment X     Number of administrations of Analgesic Narcotics 1     Total Bilirubin 0.6 mg/dL     Gastrointestinal Assessment X     Potassium 4.5 mmol/L     Calcium 10.9 mg/dL     Days after Admission 0.14     ALT 17 U/L     Albumin 5.1 g/dL         Weight:   There were no vitals filed for this visit.    Intake and Output  No intake or output data in the 24 hours ending 01/08/25 5143    Diet:   Dietary Orders (From admission, onward)       Start     Ordered    01/09/25 0001  NPO Diet NPO Type: Strict NPO  Diet Effective Midnight        Question:  NPO Type  Answer:  Strict  "NPO    01/08/25 2147                     Most recent vitals:   Vitals:    01/08/25 1907 01/08/25 2025 01/08/25 2201   BP: (!) 204/88 137/86 148/84   Pulse: (!) 127 94 93   Resp: 25 12 18   Temp: 97.8 °F (36.6 °C)     TempSrc: Oral     SpO2: 98% 98% 97%   Height: 167.6 cm (66\")         Active LDAs/IV Access:   Lines, Drains & Airways       Active LDAs       Name Placement date Placement time Site Days    Peripheral IV 01/08/25 1907 Right Antecubital 01/08/25 1907  Antecubital  less than 1    Peripheral IV 01/08/25 1941 Left Antecubital 01/08/25 1941  Antecubital  less than 1                    Skin Condition:   Skin Assessments (last day)       None             Labs (abnormal labs have a star):   Labs Reviewed   COMPREHENSIVE METABOLIC PANEL - Abnormal; Notable for the following components:       Result Value    Glucose 120 (*)     Creatinine 1.10 (*)     Sodium 134 (*)     Chloride 92 (*)     CO2 18.1 (*)     Calcium 10.9 (*)     Total Protein 9.5 (*)     AST (SGOT) 42 (*)     Alkaline Phosphatase 147 (*)     Anion Gap 23.9 (*)     eGFR 57.6 (*)     All other components within normal limits    Narrative:     GFR Categories in Chronic Kidney Disease (CKD)      GFR Category          GFR (mL/min/1.73)    Interpretation  G1                     90 or greater         Normal or high (1)  G2                      60-89                Mild decrease (1)  G3a                   45-59                Mild to moderate decrease  G3b                   30-44                Moderate to severe decrease  G4                    15-29                Severe decrease  G5                    14 or less           Kidney failure          (1)In the absence of evidence of kidney disease, neither GFR category G1 or G2 fulfill the criteria for CKD.    eGFR calculation 2021 CKD-EPI creatinine equation, which does not include race as a factor   CBC WITH AUTO DIFFERENTIAL - Abnormal; Notable for the following components:    WBC 12.95 (*)     RBC 5.39 " (*)     Hemoglobin 16.8 (*)     Hematocrit 49.2 (*)     Eosinophil % 0.2 (*)     Neutrophils, Absolute 8.86 (*)     Monocytes, Absolute 0.94 (*)     All other components within normal limits   URINALYSIS W/ CULTURE IF INDICATED - Abnormal; Notable for the following components:    Ketones, UA Trace (*)     All other components within normal limits    Narrative:     In absence of clinical symptoms, the presence of pyuria, bacteria, and/or nitrites on the urinalysis result does not correlate with infection.  Urine microscopic not indicated.   POC LACTATE - Abnormal; Notable for the following components:    Lactate 3.2 (*)     All other components within normal limits   LIPASE - Normal   BLOOD CULTURE   BLOOD CULTURE   RAINBOW DRAW    Narrative:     The following orders were created for panel order [place] Draw.  Procedure                               Abnormality         Status                     ---------                               -----------         ------                     Green Top (Gel)[[id number]]                                  Final result               Lavender Top[[id number]]                                     Final result               Gold Top - SST[[id number]]                                   Final result               Light Blue Top[[id number]]                                   Final result                 Please view results for these tests on the individual orders.   LACTIC ACID, REFLEX   POC LACTATE   CBC AND DIFFERENTIAL    Narrative:     The following orders were created for panel order CBC & Differential.  Procedure                               Abnormality         Status                     ---------                               -----------         ------                     CBC Auto Differential[[id number]]        Abnormal            Final result                 Please view results for these tests on the individual orders.   GREEN TOP   LAVENDER TOP   GOLD TOP - SST   LIGHT BLUE TOP        LOC: Person, Place, Time, and Situation    Telemetry:  Med/Surg    Cardiac Monitoring Ordered: yes    EKG:   ECG 12 Lead Other; pain   Preliminary Result   HEART RATE=89  bpm   RR Gfjsbeih=459  ms   NV Rcohagvy=764  ms   P Horizontal Axis=-10  deg   P Front Axis=45  deg   QRSD Interval=75  ms   QT Llkjoggv=212  ms   DObA=170  ms   QRS Axis=-1  deg   T Wave Axis=3  deg   - ABNORMAL ECG -   Sinus rhythm   Prolonged NV interval   Anterior infarct, old   Date and Time of Study:2025-01-08 21:34:13      ECG 12 Lead Chest Pain   Preliminary Result   HEART QOLX=532  bpm   RR Forkspcl=082  ms   NV Mzatdhil=280  ms   P Horizontal Axis=6  deg   P Front Axis=75  deg   QRSD Jtdncimk=256  ms   QT Juwlbbid=358  ms   SPbA=444  ms   QRS Axis=81  deg   T Wave Axis=13  deg   - ABNORMAL ECG -   Sinus tachycardia   Multiple ventricular premature complexes   Aberrant conduction of SV complex(es)   Right atrial enlargement   Nonspecific intraventricular conduction delay   Anteroseptal infarct, age indeterminate   Date and Time of Study:2025-01-08 19:18:23          Medications Given in the ED:   Medications   sodium chloride 0.9 % flush 10 mL (has no administration in time range)   sodium chloride 0.9 % flush 10 mL (has no administration in time range)   sodium chloride 0.9 % flush 10 mL (has no administration in time range)   sodium chloride 0.9 % infusion 40 mL (has no administration in time range)   acetaminophen (TYLENOL) tablet 650 mg (has no administration in time range)     Or   acetaminophen (TYLENOL) 160 MG/5ML oral solution 650 mg (has no administration in time range)     Or   acetaminophen (TYLENOL) suppository 650 mg (has no administration in time range)   sennosides-docusate (PERICOLACE) 8.6-50 MG per tablet 2 tablet (has no administration in time range)     And   polyethylene glycol (MIRALAX) packet 17 g (has no administration in time range)     And   bisacodyl (DULCOLAX) EC tablet 5 mg (has no administration in time  range)     And   bisacodyl (DULCOLAX) suppository 10 mg (has no administration in time range)   ondansetron ODT (ZOFRAN-ODT) disintegrating tablet 4 mg (has no administration in time range)     Or   ondansetron (ZOFRAN) injection 4 mg (has no administration in time range)   aluminum-magnesium hydroxide-simethicone (MAALOX MAX) 400-400-40 MG/5ML suspension 15 mL (has no administration in time range)   HYDROmorphone (DILAUDID) injection 1 mg (1 mg Intravenous Given 1/8/25 1938)   ondansetron (ZOFRAN) injection 4 mg (4 mg Intravenous Given 1/8/25 1937)   famotidine (PEPCID) injection 20 mg (20 mg Intravenous Given 1/8/25 1938)   sodium chloride 0.9 % bolus 1,000 mL (1,000 mL Intravenous New Bag 1/8/25 2031)   iopamidol (ISOVUE-370) 76 % injection 100 mL (100 mL Intravenous Given 1/8/25 2052)   cefTRIAXone (ROCEPHIN) 2,000 mg in sodium chloride 0.9 % 100 mL MBP (2,000 mg Intravenous New Bag 1/8/25 2139)   metroNIDAZOLE (FLAGYL) IVPB 500 mg (500 mg Intravenous New Bag 1/8/25 2136)       Imaging results:  CT Abdomen Pelvis With Contrast    Result Date: 1/8/2025  Impression: 1.Abnormal dilatation of the common hepatic and common bile duct measuring up to 1.5 cm in diameter. There is no definite choledocholithiasis. I would recommend correlation with liver enzyme levels. 2.Small uterine fibroids. 3.Grade 1 anterior spondylolisthesis of L5 on S1 secondary to chronic pars defects. 4.Additional findings as noted above. Electronically Signed: Margarito Damon MD  1/8/2025 9:05 PM EST  Workstation ID: [id number]     Social issues:   Social History     Socioeconomic History    Marital status:    Tobacco Use    Smoking status: Never     Passive exposure: Past (Parents smoked in home)    Smokeless tobacco: Never    Tobacco comments:     NO CAFFEINE    Vaping Use    Vaping status: Never Used   Substance and Sexual Activity    Alcohol use: Yes     Alcohol/week: 8.0 standard drinks of alcohol     Types: 6 Glasses of wine, 2 Drinks  containing 0.5 oz of alcohol per week     Comment: socially    Drug use: Never    Sexual activity: Defer       NIH Stroke Scale:  Interval: (not recorded)  1a. Level of Consciousness: (not recorded)  1b. LOC Questions: (not recorded)  1c. LOC Commands: (not recorded)  2. Best Gaze: (not recorded)  3. Visual: (not recorded)  4. Facial Palsy: (not recorded)  5a. Motor Arm, Left: (not recorded)  5b. Motor Arm, Right: (not recorded)  6a. Motor Leg, Left: (not recorded)  6b. Motor Leg, Right: (not recorded)  7. Limb Ataxia: (not recorded)  8. Sensory: (not recorded)  9. Best Language: (not recorded)  10. Dysarthria: (not recorded)  11. Extinction and Inattention (formerly Neglect): (not recorded)    Total (NIH Stroke Scale): (not recorded)     Additional notable assessment information:     Nursing report ED to floor:  obs    Ravindra Catalan RN   01/08/25 22:33 EST

## 2025-01-09 NOTE — CASE MANAGEMENT/SOCIAL WORK
Discharge Planning Assessment   Robin     Patient Name: Alecia Hunter  MRN: [id number]  Today's Date: 1/9/2025    Admit Date: 1/8/2025    Plan: Routine home w/ spouse   Discharge Needs Assessment       Row Name 01/09/25 0959       Living Environment    People in Home spouse    Name(s) of People in Home  Woody    Current Living Arrangements home    Potentially Unsafe Housing Conditions none    In the past 12 months has the electric, gas, oil, or water company threatened to shut off services in your home? No    Primary Care Provided by self    Provides Primary Care For no one    Family Caregiver if Needed spouse    Family Caregiver Names Woody    Quality of Family Relationships helpful;involved;supportive    Able to Return to Prior Arrangements yes       Resource/Environmental Concerns    Resource/Environmental Concerns none    Transportation Concerns none       Transportation Needs    In the past 12 months, has lack of transportation kept you from medical appointments or from getting medications? no    In the past 12 months, has lack of transportation kept you from meetings, work, or from getting things needed for daily living? No       Food Insecurity    Within the past 12 months, you worried that your food would run out before you got the money to buy more. Never true    Within the past 12 months, the food you bought just didn't last and you didn't have money to get more. Never true       Transition Planning    Patient/Family Anticipates Transition to home with family    Patient/Family Anticipated Services at Transition none    Transportation Anticipated car, drives self;family or friend will provide       Discharge Needs Assessment    Readmission Within the Last 30 Days no previous admission in last 30 days    Equipment Currently Used at Home cpap    Concerns to be Addressed denies needs/concerns at this time    Anticipated Changes Related to Illness none    Equipment Needed After Discharge none                    Discharge Plan       Row Name 01/09/25 0959       Plan    Plan Routine home w/ spouse    Plan Comments CM met with patient and  Woody at the bedside. Confirmed PCP, insurance, and pharmacy. Patient declined M2B. Patient denies any difficulty affording medications. Patient is not current with any HHC/OPPT/OT services. Patient lives at home with her , is independent with ADLS/IADLS, and drives.  Woody able to provide DC transport. DC Barriers: GI and Gen Surgery following, NPO, IV rocephin and flagyl.                  Continued Care and Services - Admitted Since 1/8/2025    No active coordination exists for this encounter.       Expected Discharge Date and Time       Expected Discharge Date Expected Discharge Time    [place] 10, 2025            Demographic Summary       Row Name 01/09/25 0958       General Information    Admission Type inpatient    Arrived From emergency department    Referral Source admission list    Reason for Consult discharge planning    Preferred Language English       Contact Information    Permission Granted to Share Info With     Contact Information Obtained for                    Functional Status       Row Name 01/09/25 0958       Functional Status    Usual Activity Tolerance good    Current Activity Tolerance good       Functional Status, IADL    Medications independent    Meal Preparation independent    Housekeeping independent    Laundry independent    Shopping independent           Pardeep Norris RN     Cell number [phone]  Office number [phone]

## 2025-01-10 ENCOUNTER — ANESTHESIA EVENT (OUTPATIENT)
Dept: PERIOP | Facility: HOSPITAL | Age: 60
End: 2025-01-10
Payer: COMMERCIAL

## 2025-01-10 ENCOUNTER — ANESTHESIA (OUTPATIENT)
Dept: PERIOP | Facility: HOSPITAL | Age: 60
End: 2025-01-10
Payer: COMMERCIAL

## 2025-01-10 LAB
ALBUMIN SERPL-MCNC: 3.8 G/DL (ref 3.5–5.2)
ALBUMIN/GLOB SERPL: 1.4 G/DL
ALP SERPL-CCNC: 197 U/L (ref 39–117)
ALT SERPL W P-5'-P-CCNC: 40 U/L (ref 1–33)
ANION GAP SERPL CALCULATED.3IONS-SCNC: 10.1 MMOL/L (ref 5–15)
AST SERPL-CCNC: 74 U/L (ref 1–32)
BASOPHILS # BLD AUTO: 0.05 10*3/MM3 (ref 0–0.2)
BASOPHILS NFR BLD AUTO: 0.7 % (ref 0–1.5)
BILIRUB SERPL-MCNC: 0.4 MG/DL (ref 0–1.2)
BUN SERPL-MCNC: 6 MG/DL (ref 8–23)
BUN/CREAT SERPL: 8 (ref 7–25)
CALCIUM SPEC-SCNC: 9.3 MG/DL (ref 8.6–10.5)
CHLORIDE SERPL-SCNC: 103 MMOL/L (ref 98–107)
CO2 SERPL-SCNC: 25.9 MMOL/L (ref 22–29)
CREAT SERPL-MCNC: 0.75 MG/DL (ref 0.57–1)
DEPRECATED RDW RBC AUTO: 47.7 FL (ref 37–54)
EGFRCR SERPLBLD CKD-EPI 2021: 91.3 ML/MIN/1.73
EOSINOPHIL # BLD AUTO: 0.25 10*3/MM3 (ref 0–0.4)
EOSINOPHIL NFR BLD AUTO: 3.5 % (ref 0.3–6.2)
ERYTHROCYTE [DISTWIDTH] IN BLOOD BY AUTOMATED COUNT: 13.6 % (ref 12.3–15.4)
GLOBULIN UR ELPH-MCNC: 2.7 GM/DL
GLUCOSE SERPL-MCNC: 84 MG/DL (ref 65–99)
HCT VFR BLD AUTO: 41.6 % (ref 34–46.6)
HGB BLD-MCNC: 13.2 G/DL (ref 12–15.9)
IMM GRANULOCYTES # BLD AUTO: 0.02 10*3/MM3 (ref 0–0.05)
IMM GRANULOCYTES NFR BLD AUTO: 0.3 % (ref 0–0.5)
LYMPHOCYTES # BLD AUTO: 1.8 10*3/MM3 (ref 0.7–3.1)
LYMPHOCYTES NFR BLD AUTO: 25.1 % (ref 19.6–45.3)
MCH RBC QN AUTO: 30.1 PG (ref 26.6–33)
MCHC RBC AUTO-ENTMCNC: 31.7 G/DL (ref 31.5–35.7)
MCV RBC AUTO: 95 FL (ref 79–97)
MONOCYTES # BLD AUTO: 0.67 10*3/MM3 (ref 0.1–0.9)
MONOCYTES NFR BLD AUTO: 9.4 % (ref 5–12)
NEUTROPHILS NFR BLD AUTO: 4.37 10*3/MM3 (ref 1.7–7)
NEUTROPHILS NFR BLD AUTO: 61 % (ref 42.7–76)
NRBC BLD AUTO-RTO: 0 /100 WBC (ref 0–0.2)
PLATELET # BLD AUTO: 335 10*3/MM3 (ref 140–450)
PMV BLD AUTO: 9.4 FL (ref 6–12)
POTASSIUM SERPL-SCNC: 4 MMOL/L (ref 3.5–5.2)
PROT SERPL-MCNC: 6.5 G/DL (ref 6–8.5)
RBC # BLD AUTO: 4.38 10*6/MM3 (ref 3.77–5.28)
SODIUM SERPL-SCNC: 139 MMOL/L (ref 136–145)
WBC NRBC COR # BLD AUTO: 7.16 10*3/MM3 (ref 3.4–10.8)

## 2025-01-10 PROCEDURE — 25010000002 FENTANYL CITRATE (PF) 100 MCG/2ML SOLUTION

## 2025-01-10 PROCEDURE — 85025 COMPLETE CBC W/AUTO DIFF WBC: CPT

## 2025-01-10 PROCEDURE — 25010000002 CEFTRIAXONE PER 250 MG: Performed by: SURGERY

## 2025-01-10 PROCEDURE — 25010000002 HYDROMORPHONE PER 4 MG: Performed by: INTERNAL MEDICINE

## 2025-01-10 PROCEDURE — 0FT44ZZ RESECTION OF GALLBLADDER, PERCUTANEOUS ENDOSCOPIC APPROACH: ICD-10-PCS | Performed by: SURGERY

## 2025-01-10 PROCEDURE — 80053 COMPREHEN METABOLIC PANEL: CPT | Performed by: STUDENT IN AN ORGANIZED HEALTH CARE EDUCATION/TRAINING PROGRAM

## 2025-01-10 PROCEDURE — 25010000002 INDOCYANINE GREEN 25 MG RECONSTITUTED SOLUTION: Performed by: SURGERY

## 2025-01-10 PROCEDURE — 8E0W4CZ ROBOTIC ASSISTED PROCEDURE OF TRUNK REGION, PERCUTANEOUS ENDOSCOPIC APPROACH: ICD-10-PCS | Performed by: SURGERY

## 2025-01-10 PROCEDURE — 25010000002 CEFAZOLIN PER 500 MG

## 2025-01-10 PROCEDURE — 25010000002 SUGAMMADEX 200 MG/2ML SOLUTION

## 2025-01-10 PROCEDURE — 25010000002 ONDANSETRON PER 1 MG

## 2025-01-10 PROCEDURE — 47562 LAPAROSCOPIC CHOLECYSTECTOMY: CPT | Performed by: SPECIALIST/TECHNOLOGIST, OTHER

## 2025-01-10 PROCEDURE — 25810000003 LACTATED RINGERS PER 1000 ML

## 2025-01-10 PROCEDURE — 25010000002 HYDROMORPHONE PER 4 MG: Performed by: SURGERY

## 2025-01-10 PROCEDURE — 47562 LAPAROSCOPIC CHOLECYSTECTOMY: CPT | Performed by: SURGERY

## 2025-01-10 PROCEDURE — 88304 TISSUE EXAM BY PATHOLOGIST: CPT | Performed by: SURGERY

## 2025-01-10 PROCEDURE — 25010000002 ONDANSETRON PER 1 MG: Performed by: INTERNAL MEDICINE

## 2025-01-10 PROCEDURE — 25010000002 MIDAZOLAM PER 1 MG: Performed by: ANESTHESIOLOGY

## 2025-01-10 PROCEDURE — 25010000002 PROPOFOL 10 MG/ML EMULSION

## 2025-01-10 PROCEDURE — 25010000002 LIDOCAINE PF 2% 2 % SOLUTION

## 2025-01-10 PROCEDURE — 25010000002 BUPIVACAINE 0.25 % SOLUTION: Performed by: SURGERY

## 2025-01-10 PROCEDURE — 0DNU4ZZ RELEASE OMENTUM, PERCUTANEOUS ENDOSCOPIC APPROACH: ICD-10-PCS | Performed by: SURGERY

## 2025-01-10 PROCEDURE — 25010000002 DEXAMETHASONE PER 1 MG

## 2025-01-10 PROCEDURE — 25010000002 HYDROMORPHONE 1 MG/ML SOLUTION

## 2025-01-10 DEVICE — MEDIUM-LARGE LIGATION CLIPS 6 CLIPS/CART
Type: IMPLANTABLE DEVICE | Site: ABDOMEN | Status: FUNCTIONAL
Brand: VAS-Q-CLIP

## 2025-01-10 RX ORDER — BUPIVACAINE HYDROCHLORIDE 2.5 MG/ML
INJECTION, SOLUTION INFILTRATION; PERINEURAL AS NEEDED
Status: DISCONTINUED | OUTPATIENT
Start: 2025-01-10 | End: 2025-01-10 | Stop reason: HOSPADM

## 2025-01-10 RX ORDER — ONDANSETRON 2 MG/ML
4 INJECTION INTRAMUSCULAR; INTRAVENOUS ONCE AS NEEDED
Status: COMPLETED | OUTPATIENT
Start: 2025-01-10 | End: 2025-01-10

## 2025-01-10 RX ORDER — HYDROCODONE BITARTRATE AND ACETAMINOPHEN 5; 325 MG/1; MG/1
1 TABLET ORAL EVERY 6 HOURS PRN
Status: DISCONTINUED | OUTPATIENT
Start: 2025-01-10 | End: 2025-01-11 | Stop reason: HOSPADM

## 2025-01-10 RX ORDER — ROCURONIUM BROMIDE 10 MG/ML
INJECTION, SOLUTION INTRAVENOUS AS NEEDED
Status: DISCONTINUED | OUTPATIENT
Start: 2025-01-10 | End: 2025-01-10 | Stop reason: SURG

## 2025-01-10 RX ORDER — MIDAZOLAM HYDROCHLORIDE 1 MG/ML
INJECTION, SOLUTION INTRAMUSCULAR; INTRAVENOUS AS NEEDED
Status: DISCONTINUED | OUTPATIENT
Start: 2025-01-10 | End: 2025-01-10 | Stop reason: SURG

## 2025-01-10 RX ORDER — SODIUM CHLORIDE, SODIUM LACTATE, POTASSIUM CHLORIDE, CALCIUM CHLORIDE 600; 310; 30; 20 MG/100ML; MG/100ML; MG/100ML; MG/100ML
INJECTION, SOLUTION INTRAVENOUS CONTINUOUS PRN
Status: DISCONTINUED | OUTPATIENT
Start: 2025-01-10 | End: 2025-01-10 | Stop reason: SURG

## 2025-01-10 RX ORDER — LIDOCAINE HYDROCHLORIDE 20 MG/ML
INJECTION, SOLUTION EPIDURAL; INFILTRATION; INTRACAUDAL; PERINEURAL AS NEEDED
Status: DISCONTINUED | OUTPATIENT
Start: 2025-01-10 | End: 2025-01-10 | Stop reason: SURG

## 2025-01-10 RX ORDER — CEFAZOLIN SODIUM 1 G/3ML
INJECTION, POWDER, FOR SOLUTION INTRAMUSCULAR; INTRAVENOUS AS NEEDED
Status: DISCONTINUED | OUTPATIENT
Start: 2025-01-10 | End: 2025-01-10 | Stop reason: SURG

## 2025-01-10 RX ORDER — FENTANYL CITRATE 50 UG/ML
INJECTION, SOLUTION INTRAMUSCULAR; INTRAVENOUS AS NEEDED
Status: DISCONTINUED | OUTPATIENT
Start: 2025-01-10 | End: 2025-01-10 | Stop reason: SURG

## 2025-01-10 RX ORDER — NALOXONE HCL 0.4 MG/ML
0.4 VIAL (ML) INJECTION AS NEEDED
Status: DISCONTINUED | OUTPATIENT
Start: 2025-01-10 | End: 2025-01-10 | Stop reason: HOSPADM

## 2025-01-10 RX ORDER — DEXMEDETOMIDINE HYDROCHLORIDE 100 UG/ML
INJECTION, SOLUTION INTRAVENOUS AS NEEDED
Status: DISCONTINUED | OUTPATIENT
Start: 2025-01-10 | End: 2025-01-10 | Stop reason: SURG

## 2025-01-10 RX ORDER — DEXAMETHASONE SODIUM PHOSPHATE 4 MG/ML
INJECTION, SOLUTION INTRA-ARTICULAR; INTRALESIONAL; INTRAMUSCULAR; INTRAVENOUS; SOFT TISSUE AS NEEDED
Status: DISCONTINUED | OUTPATIENT
Start: 2025-01-10 | End: 2025-01-10 | Stop reason: SURG

## 2025-01-10 RX ORDER — LABETALOL HYDROCHLORIDE 5 MG/ML
5 INJECTION, SOLUTION INTRAVENOUS
Status: DISCONTINUED | OUTPATIENT
Start: 2025-01-10 | End: 2025-01-10 | Stop reason: HOSPADM

## 2025-01-10 RX ORDER — IPRATROPIUM BROMIDE AND ALBUTEROL SULFATE 2.5; .5 MG/3ML; MG/3ML
3 SOLUTION RESPIRATORY (INHALATION) ONCE AS NEEDED
Status: DISCONTINUED | OUTPATIENT
Start: 2025-01-10 | End: 2025-01-10 | Stop reason: HOSPADM

## 2025-01-10 RX ORDER — ONDANSETRON 2 MG/ML
INJECTION INTRAMUSCULAR; INTRAVENOUS AS NEEDED
Status: DISCONTINUED | OUTPATIENT
Start: 2025-01-10 | End: 2025-01-10 | Stop reason: SURG

## 2025-01-10 RX ORDER — ACETAMINOPHEN 500 MG
1000 TABLET ORAL ONCE AS NEEDED
Status: DISCONTINUED | OUTPATIENT
Start: 2025-01-10 | End: 2025-01-10 | Stop reason: HOSPADM

## 2025-01-10 RX ORDER — PROPOFOL 10 MG/ML
VIAL (ML) INTRAVENOUS AS NEEDED
Status: DISCONTINUED | OUTPATIENT
Start: 2025-01-10 | End: 2025-01-10 | Stop reason: SURG

## 2025-01-10 RX ADMIN — MIDAZOLAM 2 MG: 1 INJECTION INTRAMUSCULAR; INTRAVENOUS at 13:18

## 2025-01-10 RX ADMIN — ONDANSETRON 4 MG: 2 INJECTION INTRAMUSCULAR; INTRAVENOUS at 08:06

## 2025-01-10 RX ADMIN — SODIUM CHLORIDE, SODIUM LACTATE, POTASSIUM CHLORIDE, AND CALCIUM CHLORIDE: .6; .31; .03; .02 INJECTION, SOLUTION INTRAVENOUS at 14:22

## 2025-01-10 RX ADMIN — PANTOPRAZOLE SODIUM 40 MG: 40 INJECTION, POWDER, FOR SOLUTION INTRAVENOUS at 08:06

## 2025-01-10 RX ADMIN — ROCURONIUM BROMIDE 10 MG: 10 INJECTION, SOLUTION INTRAVENOUS at 16:21

## 2025-01-10 RX ADMIN — DEXAMETHASONE SODIUM PHOSPHATE 4 MG: 4 INJECTION, SOLUTION INTRAMUSCULAR; INTRAVENOUS at 14:51

## 2025-01-10 RX ADMIN — HYDROMORPHONE HYDROCHLORIDE 0.5 MG: 1 INJECTION, SOLUTION INTRAMUSCULAR; INTRAVENOUS; SUBCUTANEOUS at 08:06

## 2025-01-10 RX ADMIN — PROPOFOL 200 MG: 10 INJECTION, EMULSION INTRAVENOUS at 14:32

## 2025-01-10 RX ADMIN — HYDROMORPHONE HYDROCHLORIDE 0.5 MG: 1 INJECTION, SOLUTION INTRAMUSCULAR; INTRAVENOUS; SUBCUTANEOUS at 17:20

## 2025-01-10 RX ADMIN — CEFAZOLIN 2 G: 1 INJECTION, POWDER, FOR SOLUTION INTRAMUSCULAR; INTRAVENOUS at 14:35

## 2025-01-10 RX ADMIN — SENNOSIDES AND DOCUSATE SODIUM 2 TABLET: 50; 8.6 TABLET ORAL at 20:05

## 2025-01-10 RX ADMIN — HYDROMORPHONE HYDROCHLORIDE 0.5 MG: 1 INJECTION, SOLUTION INTRAMUSCULAR; INTRAVENOUS; SUBCUTANEOUS at 21:58

## 2025-01-10 RX ADMIN — FENTANYL CITRATE 50 MCG: 50 INJECTION, SOLUTION INTRAMUSCULAR; INTRAVENOUS at 15:01

## 2025-01-10 RX ADMIN — HYDROMORPHONE HYDROCHLORIDE 0.5 MG: 1 INJECTION, SOLUTION INTRAMUSCULAR; INTRAVENOUS; SUBCUTANEOUS at 17:48

## 2025-01-10 RX ADMIN — ONDANSETRON 4 MG: 2 INJECTION INTRAMUSCULAR; INTRAVENOUS at 18:35

## 2025-01-10 RX ADMIN — LEVOTHYROXINE SODIUM 25 MCG: 0.03 TABLET ORAL at 20:05

## 2025-01-10 RX ADMIN — HYDROCODONE BITARTRATE AND ACETAMINOPHEN 1 TABLET: 5; 325 TABLET ORAL at 08:06

## 2025-01-10 RX ADMIN — AMLODIPINE BESYLATE 2.5 MG: 2.5 TABLET ORAL at 20:05

## 2025-01-10 RX ADMIN — SUGAMMADEX 200 MG: 100 INJECTION, SOLUTION INTRAVENOUS at 17:21

## 2025-01-10 RX ADMIN — HYDROMORPHONE HYDROCHLORIDE 0.5 MG: 1 INJECTION, SOLUTION INTRAMUSCULAR; INTRAVENOUS; SUBCUTANEOUS at 18:35

## 2025-01-10 RX ADMIN — METOPROLOL SUCCINATE 100 MG: 50 TABLET, EXTENDED RELEASE ORAL at 20:06

## 2025-01-10 RX ADMIN — DEXMEDETOMIDINE HYDROCHLORIDE 10 MCG: 100 INJECTION, SOLUTION INTRAVENOUS at 17:23

## 2025-01-10 RX ADMIN — HYDROMORPHONE HYDROCHLORIDE 0.5 MG: 1 INJECTION, SOLUTION INTRAMUSCULAR; INTRAVENOUS; SUBCUTANEOUS at 16:35

## 2025-01-10 RX ADMIN — ZOLPIDEM TARTRATE 5 MG: 5 TABLET ORAL at 21:58

## 2025-01-10 RX ADMIN — INDOCYANINE GREEN AND WATER 2.5 MG: KIT at 11:16

## 2025-01-10 RX ADMIN — ROCURONIUM BROMIDE 50 MG: 10 INJECTION, SOLUTION INTRAVENOUS at 14:32

## 2025-01-10 RX ADMIN — CEFTRIAXONE 2000 MG: 2 INJECTION, POWDER, FOR SOLUTION INTRAMUSCULAR; INTRAVENOUS at 20:06

## 2025-01-10 RX ADMIN — LIDOCAINE HYDROCHLORIDE 100 MG: 20 INJECTION, SOLUTION EPIDURAL; INFILTRATION; INTRACAUDAL; PERINEURAL at 14:32

## 2025-01-10 RX ADMIN — ONDANSETRON 4 MG: 2 INJECTION INTRAMUSCULAR; INTRAVENOUS at 17:17

## 2025-01-10 RX ADMIN — Medication 10 ML: at 20:10

## 2025-01-10 RX ADMIN — FENTANYL CITRATE 50 MCG: 50 INJECTION, SOLUTION INTRAMUSCULAR; INTRAVENOUS at 14:32

## 2025-01-10 RX ADMIN — ROCURONIUM BROMIDE 20 MG: 10 INJECTION, SOLUTION INTRAVENOUS at 16:36

## 2025-01-10 RX ADMIN — ROCURONIUM BROMIDE 20 MG: 10 INJECTION, SOLUTION INTRAVENOUS at 15:39

## 2025-01-10 RX ADMIN — SENNOSIDES AND DOCUSATE SODIUM 2 TABLET: 50; 8.6 TABLET ORAL at 08:06

## 2025-01-10 RX ADMIN — MIDAZOLAM 2 MG: 1 INJECTION INTRAMUSCULAR; INTRAVENOUS at 14:28

## 2025-01-10 NOTE — PLAN OF CARE
Problem: Pain Acute  Goal: Optimal Pain Control and Function  Outcome: Progressing  Intervention: Optimize Psychosocial Wellbeing  Recent Flowsheet Documentation  Taken 1/9/2025 2010 by Aparna Trotter RN  Supportive Measures: active listening utilized  Diversional Activities: television  Intervention: Prevent or Manage Pain  Recent Flowsheet Documentation  Taken 1/10/2025 0205 by Aparna Trotter RN  Medication Review/Management: medications reviewed  Taken 1/10/2025 0010 by Aparna Trotter RN  Medication Review/Management: medications reviewed  Taken 1/9/2025 2200 by Aparna Trotter RN  Medication Review/Management: medications reviewed  Taken 1/9/2025 2010 by Aparna Trotter RN  Sleep/Rest Enhancement: awakenings minimized  Medication Review/Management: medications reviewed     Problem: Nausea and Vomiting  Goal: Nausea and Vomiting Relief  Outcome: Progressing  Intervention: Prevent and Manage Nausea and Vomiting  Recent Flowsheet Documentation  Taken 1/9/2025 2010 by Aparna Trotter RN  Environmental Support: calm environment promoted     Problem: Nausea and Vomiting  Goal: Nausea and Vomiting Relief  Outcome: Progressing  Intervention: Prevent and Manage Nausea and Vomiting  Recent Flowsheet Documentation  Taken 1/9/2025 2010 by Aparna Trotter RN  Environmental Support: calm environment promoted   Goal Outcome Evaluation:

## 2025-01-10 NOTE — CASE MANAGEMENT/SOCIAL WORK
Continued Stay Note  LUCAS Kelly     Patient Name: Alecia Hunter  MRN: [id number]  Today's Date: 1/10/2025    Admit Date: 1/8/2025    Plan: Routine home with spouse   Discharge Plan       Row Name 01/10/25 1254       Plan    Plan Routine home with spouse    Patient/Family in Agreement with Plan yes    Plan Comments DC barriers: npo for open Cholecystectomy today, IV zoraida Joel RN     Office: [phone]

## 2025-01-10 NOTE — ANESTHESIA PROCEDURE NOTES
Airway  Urgency: elective    Date/Time: 1/10/2025 2:34 PM  Airway not difficult    General Information and Staff    Patient location during procedure: OR    Indications and Patient Condition  Indications for airway management: airway protection    Preoxygenated: yes  Mask difficulty assessment: 2 - vent by mask + OA or adjuvant +/- NMBA    Final Airway Details  Final airway type: endotracheal airway      Successful airway: ETT     Successful intubation technique: video laryngoscopy  Endotracheal tube insertion site: oral  Blade: Christine  Blade size: 4  ETT size (mm): 7.0  Cormack-Lehane Classification: grade I - full view of glottis  Placement verified by: chest auscultation and capnometry   Measured from: teeth  ETT/EBT  to teeth (cm): 20  Number of attempts at approach: 1  Assessment: lips, teeth, and gum same as pre-op and atraumatic intubation

## 2025-01-10 NOTE — ANESTHESIA PREPROCEDURE EVALUATION
Anesthesia Evaluation     Patient summary reviewed   no history of anesthetic complications:   NPO Solid Status: > 8 hours  NPO Liquid Status: > 8 hours           Airway   Mallampati: I  TM distance: >3 FB  Neck ROM: full  Dental - normal exam     Pulmonary - normal exam   (+) ,sleep apnea  Cardiovascular - normal exam    ECG reviewed    (+) hypertension, PVD, hyperlipidemia      Neuro/Psych  (+) psychiatric history Anxiety and Depression  GI/Hepatic/Renal/Endo    (+) thyroid problem hypothyroidism    Musculoskeletal     (+) back pain  Abdominal    Substance History      OB/GYN          Other                    Anesthesia Plan    ASA 3     general     intravenous induction     Anesthetic plan, risks, benefits, and alternatives have been provided, discussed and informed consent has been obtained with: patient.    Plan discussed with CRNA.    CODE STATUS:    Level Of Support Discussed With: Patient  Code Status (Patient has no pulse and is not breathing): CPR (Attempt to Resuscitate)  Medical Interventions (Patient has pulse or is breathing): Full Support

## 2025-01-10 NOTE — CONSULTS
General Surgery Consult Note      Name: Alecia Hunter ADMIT: 2025   : 1965  PCP: Margarito Monroe MD    MRN: [id number] LOS: 1 days   AGE/SEX: 59 y.o. female  ROOM: 103/1   [facility]      Patient Care Team:  Margarito Monroe MD as PCP - General (Internal Medicine)  Delvin Roman Jr., MD as Consulting Physician (Cardiology)  Chief Complaint   Patient presents with    Abdominal Pain       HPI  59 y.o. female with hx of gastric bypass, SHABANA, hypothyrodisim, HTN who presented with severe epigastric pain, radiating ot the back.  Pain started 2 hrs after eating a hot dog.  Associated with nausea and vomiting.  She reports she often has epigastric and left sided abdominal pain after eating too much but this was significantly different.  No relief with zantac.    Subjective fevers.      Past Medical History:   Diagnosis Date    Constipation 2016    Depression 2016    Disease of thyroid gland     History of colon polyps     History of transfusion     Hypertension     Preop examination 2021    Screening for diabetes mellitus 2018    Walking pneumonia     Weight gain 2016     Past Surgical History:   Procedure Laterality Date    AUGMENTATION MAMMAPLASTY Bilateral     ssaline     SECTION      COLONOSCOPY      LEC    COLONOSCOPY N/A 2023    Procedure: COLONOSCOPY INTO TERMINAL ILEUM WITH POLYPECTOMY (COLD SNARE)and  (HOT);  Surgeon: Reva Malik MD;  Location: [facility] ENDOSCOPY;  Service: Gastroenterology;  Laterality: N/A;  PREOP/ SCREENING- POSTOP/ POLYPS, HEMORRHOIDS    GASTRIC BYPASS      HERNIA REPAIR      OOPHORECTOMY Left     REDUCTION MAMMAPLASTY Bilateral    Exploratory laparotomy and small bowel resection  Abdominoplasty    Family History   Problem Relation Age of Onset    COPD Mother     Cancer Father         colon cancer    Colon cancer Father     Heart attack Father 62    Malig Hyperthermia Neg Hx        Social History  "    Tobacco Use    Smoking status: Never     Passive exposure: Past (Parents smoked in home)    Smokeless tobacco: Never    Tobacco comments:     NO CAFFEINE    Vaping Use    Vaping status: Never Used   Substance Use Topics    Alcohol use: Yes     Alcohol/week: 8.0 standard drinks of alcohol     Types: 6 Glasses of wine, 2 Drinks containing 0.5 oz of alcohol per week     Comment: socially    Drug use: Never     Medications Prior to Admission   Medication Sig Dispense Refill Last Dose/Taking    amLODIPine (NORVASC) 2.5 MG tablet Take 1 tablet by mouth Daily. 90 tablet 3 1/7/2025    aspirin 81 MG EC tablet Take 1 tablet by mouth Daily. (Patient taking differently: Take 1 tablet by mouth Daily. Three times a week)   1/7/2025    cholecalciferol (VITAMIN D3) 25 MCG (1000 UT) tablet Take 2 tablets by mouth Daily.   1/7/2025    cyanocobalamin 1000 MCG/ML injection Inject 1 mL into the appropriate muscle as directed by prescriber 1 (One) Time Per Week.   Past Week    estradiol (CLIMARA) 0.05 MG/24HR patch Place 1 patch on the skin as directed by provider.   1/8/2025    hydrOXYzine (ATARAX) 25 MG tablet Take 1 tablet by mouth.   1/8/2025    levothyroxine (SYNTHROID, LEVOTHROID) 25 MCG tablet TAKE 1 TABLET BY MOUTH DAILY 90 tablet 1 1/8/2025    meloxicam (MOBIC) 15 MG tablet Take 1 tablet by mouth Daily.   Past Week    metoprolol succinate XL (TOPROL-XL) 100 MG 24 hr tablet Take 1 tablet by mouth Daily. 90 tablet 3 1/8/2025    Progesterone (PROMETRIUM) 200 MG capsule Take 1 capsule by mouth Daily.   1/8/2025    Semaglutide,0.25 or 0.5MG/DOS, (Ozempic, 0.25 or 0.5 MG/DOSE,) 2 MG/1.5ML solution pen-injector INJECT 0.25MG INTO THE SKIN ONCE WEEKLY X 4 DOSES THEN 0.5MG WEEKLY   Past Week    Semaglutide-Weight Management 0.5 MG/0.5ML solution auto-injector Inject 0.5 mL under the skin into the appropriate area as directed 1 (One) Time Per Week.   Past Week    Syringe/Needle, Disp, 23G X 1\" 3 ML misc Use to inject vitamin b12 " weekly 100 each 3 Past Week    traMADol ER (ULTRAM-ER) 200 MG 24 hr tablet Take 1 tablet by mouth Daily.   1/7/2025    valACYclovir (Valtrex) 500 MG tablet Take 1 tablet by mouth Daily. (Patient taking differently: Take 1 tablet by mouth Daily. PRN) 90 tablet 3 1/7/2025    zolpidem (AMBIEN) 5 MG tablet Take 1 tablet by mouth At Night As Needed. As needed   1/8/2025    amLODIPine (NORVASC) 5 MG tablet Take 1 tablet by mouth Daily.       busPIRone (BUSPAR) 5 MG tablet Take 2 tablets by mouth 3 (Three) Times a Day. (Patient taking differently: Take 2 tablets by mouth 3 (Three) Times a Day As Needed. As needed) 180 tablet 3     Misc Natural Products (Airborne Elderberry) chewable tablet Chew.        amLODIPine, 2.5 mg, Oral, Nightly  cefTRIAXone, 2,000 mg, Intravenous, Q24H  [START ON 1/10/2025] indocyanine green, 2.5 mg, Intravenous, Once  levothyroxine, 25 mcg, Oral, Nightly  metoprolol succinate XL, 100 mg, Oral, Nightly  pantoprazole, 40 mg, Intravenous, BID AC  Progesterone, 200 mg, Oral, Daily  senna-docusate sodium, 2 tablet, Oral, BID  sodium chloride, 10 mL, Intravenous, Q12H           acetaminophen **OR** acetaminophen **OR** acetaminophen    aluminum-magnesium hydroxide-simethicone    senna-docusate sodium **AND** polyethylene glycol **AND** bisacodyl **AND** bisacodyl    HYDROcodone-acetaminophen    HYDROmorphone    hydrOXYzine    ondansetron ODT **OR** ondansetron    sodium chloride    sodium chloride    sodium chloride    traMADol    zolpidem  Losartan    Review of Systems:   As noted above in HPI    Vitals:  Temp:  [97.4 °F (36.3 °C)-98.7 °F (37.1 °C)] 97.7 °F (36.5 °C)  Heart Rate:  [65-76] 65  Resp:  [18-22] 18  BP: (109-146)/(65-86) 143/84     Physical Exam:   NAD, alert  Nonlabored respirations  Abdomen soft, NT/ND  Extremities warm and well perfused, well healed midline incision and abdominoplasty incisions    Labs:  Results from last 7 days   Lab Units 01/08/25 1937   WBC 10*3/mm3 12.95*    HEMOGLOBIN g/dL 16.8*   HEMATOCRIT % 49.2*   PLATELETS 10*3/mm3 371     Results from last 7 days   Lab Units 01/08/25  1937   SODIUM mmol/L 134*   POTASSIUM mmol/L 4.5   CHLORIDE mmol/L 92*   CO2 mmol/L 18.1*   BUN mg/dL 11   CREATININE mg/dL 1.10*   CALCIUM mg/dL 10.9*   BILIRUBIN mg/dL 0.6   ALK PHOS U/L 147*   ALT (SGPT) U/L 17   AST (SGOT) U/L 42*   GLUCOSE mg/dL 120*     Imaging:  CT Abdomen/Pelvis, 1/8/25  Impression:  1.Abnormal dilatation of the common hepatic and common bile duct measuring up to 1.5 cm in diameter. There is no definite choledocholithiasis. I would recommend correlation with liver enzyme levels.  2.Small uterine fibroids.  3.Grade 1 anterior spondylolisthesis of L5 on S1 secondary to chronic pars defects.  4.Additional findings as noted above.     Ultrasound Abdomen 1/9/25  Impression:  Gallbladder sludge with gallbladder wall thickening and pericholecystic fluid. There is extrahepatic biliary ductal dilatation.    Assessment and Plan:  59 y.o. female with epigastric pain, N/V.  Imaging concerning for cholecystitis.    -Symptoms do appear biliary in origin base so cholecystectomy was offered  -Surgery along with associated risks, benefits, alternatives discussed  -Risks discussed include bleeding, infection, hernia, conversion to open, injury to bowel, injury to common bile duct  -We discussed risk of conversion to open as well as potential bowel injury is increased for her give history of multiple laparotomies  -Patient expressed understanding and would like to proceed with cholecystectomy; plan for 1/10/25    Lora Urena MD  01/09/25  22:15 EST

## 2025-01-10 NOTE — ANESTHESIA POSTPROCEDURE EVALUATION
Patient: Alecia Hunter    Procedure Summary       Date: 01/10/25 Room / Location: [facility] OR 08 / [facility] MAIN OR    Anesthesia Start: 1425 Anesthesia Stop: 1737    Procedure: CHOLECYSTECTOMY LAPAROSCOPIC AND EXTENSIVE LYSIS OF ADHESIONS WITH DAVINCI ROBOT (Abdomen) Diagnosis:     Surgeons: Lora Urena MD Provider: Reva Bell CRNA    Anesthesia Type: general ASA Status: 3            Anesthesia Type: general    Vitals  Vitals Value Taken Time   /80 01/10/25 1852   Temp 97.9 °F (36.6 °C) 01/10/25 1735   Pulse 87 01/10/25 1857   Resp 8 01/10/25 1820   SpO2 98 % 01/10/25 1857   Vitals shown include unfiled device data.        Post Anesthesia Care and Evaluation    Patient location during evaluation: PACU  Patient participation: complete - patient participated  Level of consciousness: awake  Pain scale: See nurse's notes for pain score.  Pain management: adequate    Airway patency: patent  Anesthetic complications: No anesthetic complications  PONV Status: none  Cardiovascular status: acceptable  Respiratory status: acceptable and spontaneous ventilation  Hydration status: acceptable    Comments: Patient seen and examined postoperatively; vital signs stable; SpO2 greater than or equal to 90%; cardiopulmonary status stable; nausea/vomiting adequately controlled; pain adequately controlled; no apparent anesthesia complications; patient discharged from anesthesia care when discharge criteria were met

## 2025-01-10 NOTE — PROGRESS NOTES
[facility] MEDICINE SERVICE  DAILY PROGRESS NOTE    NAME: Alecia Hunter  : 1965  MRN: [id number]      LOS: 2 days     PROVIDER OF SERVICE: Michoacano Osuna MD    Chief Complaint: Cholecystitis    Subjective:     Interval History:  History taken from: patient    Better controlled pain wise. Await procedure.        Review of Systems:   Review of Systems    Objective:     Vital Signs  Temp:  [97.4 °F (36.3 °C)-98.6 °F (37 °C)] 98.4 °F (36.9 °C)  Heart Rate:  [65-76] 67  Resp:  [16-18] 16  BP: (109-143)/(68-84) 140/80   Body mass index is 24.87 kg/m².    Physical Exam  Physical Exam  Constitutional:       Appearance: Normal appearance.   Cardiovascular:      Rate and Rhythm: Normal rate and regular rhythm.      Pulses: Normal pulses.      Heart sounds: Normal heart sounds.   Pulmonary:      Effort: Pulmonary effort is normal.      Breath sounds: Normal breath sounds.   Abdominal:      Comments: Epigastric abdominal pain   Neurological:      General: No focal deficit present.      Mental Status: She is alert.            Diagnostic Data    Results from last 7 days   Lab Units 01/10/25  0336   WBC 10*3/mm3 7.16   HEMOGLOBIN g/dL 13.2   HEMATOCRIT % 41.6   PLATELETS 10*3/mm3 335   GLUCOSE mg/dL 84   CREATININE mg/dL 0.75   BUN mg/dL 6*   SODIUM mmol/L 139   POTASSIUM mmol/L 4.0   AST (SGOT) U/L 74*   ALT (SGPT) U/L 40*   ALK PHOS U/L 197*   BILIRUBIN mg/dL 0.4   ANION GAP mmol/L 10.1       US Abdomen Limited    Result Date: 2025  Impression: Gallbladder sludge with gallbladder wall thickening and pericholecystic fluid. There is extrahepatic biliary ductal dilatation. Electronically Signed: Nathen Rodarte MD  2025 6:05 AM EST  Workstation ID: [id number]    CT Abdomen Pelvis With Contrast    Result Date: 2025  Impression: 1.Abnormal dilatation of the common hepatic and common bile duct measuring up to 1.5 cm in diameter. There is no definite choledocholithiasis. I would recommend correlation  with liver enzyme levels. 2.Small uterine fibroids. 3.Grade 1 anterior spondylolisthesis of L5 on S1 secondary to chronic pars defects. 4.Additional findings as noted above. Electronically Signed: Margarito Damon MD  1/8/2025 9:05 PM EST  Workstation ID: [id number]       I reviewed the patient's new clinical results.    Assessment/Plan:     Active and Resolved Problems  Active Hospital Problems    Diagnosis  POA    **Cholecystitis [K81.9]  Yes    Lactic acidosis [E87.20]  Yes    SHABANA (obstructive sleep apnea) [G47.33]  Yes    Hypertension [I10]  Yes    Gastric bypass status for obesity [Z98.84]  Not Applicable      Resolved Hospital Problems   No resolved problems to display.       Acute cholecystitis  -General Surgery taking patient for cholecystectomy today  -Continue antibiotics  -Pain control  -NPO for surgery    Essential hypertension  -Continue home meds    SHABANA  -Use CPAP    History of gastric bypass surgery    VTE Prophylaxis:  Mechanical VTE prophylaxis orders are present.             Disposition Planning:     Barriers to Discharge: Surgical evaluation  Anticipated Date of Discharge: 1/11/2025?  Place of Discharge: Home      Time: 30 minutes     Code Status and Medical Interventions: CPR (Attempt to Resuscitate); Full Support   Ordered at: 01/08/25 2147     Level Of Support Discussed With:    Patient     Code Status (Patient has no pulse and is not breathing):    CPR (Attempt to Resuscitate)     Medical Interventions (Patient has pulse or is breathing):    Full Support       Signature: Electronically signed by Michoacano Osuna MD, 01/10/25, 11:35 EST.  [facility], [place] Hospitalist Team

## 2025-01-11 ENCOUNTER — DOCUMENTATION (OUTPATIENT)
Dept: SURGERY | Facility: CLINIC | Age: 60
End: 2025-01-11
Payer: COMMERCIAL

## 2025-01-11 VITALS
HEIGHT: 66 IN | RESPIRATION RATE: 17 BRPM | BODY MASS INDEX: 24.75 KG/M2 | TEMPERATURE: 98.3 F | OXYGEN SATURATION: 91 % | DIASTOLIC BLOOD PRESSURE: 89 MMHG | WEIGHT: 154 LBS | HEART RATE: 83 BPM | SYSTOLIC BLOOD PRESSURE: 151 MMHG

## 2025-01-11 LAB
ALBUMIN SERPL-MCNC: 3.7 G/DL (ref 3.5–5.2)
ALBUMIN/GLOB SERPL: 1.2 G/DL
ALP SERPL-CCNC: 227 U/L (ref 39–117)
ALT SERPL W P-5'-P-CCNC: 59 U/L (ref 1–33)
ANION GAP SERPL CALCULATED.3IONS-SCNC: 11.8 MMOL/L (ref 5–15)
AST SERPL-CCNC: 144 U/L (ref 1–32)
BILIRUB SERPL-MCNC: 0.2 MG/DL (ref 0–1.2)
BUN SERPL-MCNC: 8 MG/DL (ref 8–23)
BUN/CREAT SERPL: 9.9 (ref 7–25)
CALCIUM SPEC-SCNC: 9.3 MG/DL (ref 8.6–10.5)
CHLORIDE SERPL-SCNC: 103 MMOL/L (ref 98–107)
CO2 SERPL-SCNC: 22.2 MMOL/L (ref 22–29)
CREAT SERPL-MCNC: 0.81 MG/DL (ref 0.57–1)
DEPRECATED RDW RBC AUTO: 47.8 FL (ref 37–54)
EGFRCR SERPLBLD CKD-EPI 2021: 83.2 ML/MIN/1.73
ERYTHROCYTE [DISTWIDTH] IN BLOOD BY AUTOMATED COUNT: 13.5 % (ref 12.3–15.4)
GLOBULIN UR ELPH-MCNC: 3 GM/DL
GLUCOSE SERPL-MCNC: 118 MG/DL (ref 65–99)
HCT VFR BLD AUTO: 41.7 % (ref 34–46.6)
HGB BLD-MCNC: 13.2 G/DL (ref 12–15.9)
MCH RBC QN AUTO: 30.1 PG (ref 26.6–33)
MCHC RBC AUTO-ENTMCNC: 31.7 G/DL (ref 31.5–35.7)
MCV RBC AUTO: 95 FL (ref 79–97)
PLATELET # BLD AUTO: 315 10*3/MM3 (ref 140–450)
PMV BLD AUTO: 9.8 FL (ref 6–12)
POTASSIUM SERPL-SCNC: 4.3 MMOL/L (ref 3.5–5.2)
PROT SERPL-MCNC: 6.7 G/DL (ref 6–8.5)
RBC # BLD AUTO: 4.39 10*6/MM3 (ref 3.77–5.28)
SODIUM SERPL-SCNC: 137 MMOL/L (ref 136–145)
WBC NRBC COR # BLD AUTO: 12.81 10*3/MM3 (ref 3.4–10.8)

## 2025-01-11 PROCEDURE — 25010000002 ONDANSETRON PER 1 MG: Performed by: SURGERY

## 2025-01-11 PROCEDURE — 80053 COMPREHEN METABOLIC PANEL: CPT | Performed by: SURGERY

## 2025-01-11 PROCEDURE — 85027 COMPLETE CBC AUTOMATED: CPT | Performed by: SURGERY

## 2025-01-11 PROCEDURE — 25010000002 HYDROMORPHONE PER 4 MG: Performed by: SURGERY

## 2025-01-11 RX ORDER — ONDANSETRON 4 MG/1
4 TABLET, ORALLY DISINTEGRATING ORAL EVERY 6 HOURS PRN
Qty: 30 TABLET | Refills: 0 | Status: SHIPPED | OUTPATIENT
Start: 2025-01-11

## 2025-01-11 RX ORDER — TRAMADOL HYDROCHLORIDE 50 MG/1
50 TABLET ORAL EVERY 6 HOURS PRN
Qty: 10 TABLET | Refills: 0 | Status: SHIPPED | OUTPATIENT
Start: 2025-01-11 | End: 2025-01-14

## 2025-01-11 RX ORDER — HYDROCODONE BITARTRATE AND ACETAMINOPHEN 5; 325 MG/1; MG/1
1 TABLET ORAL EVERY 6 HOURS PRN
Qty: 10 TABLET | Refills: 0 | Status: SHIPPED | OUTPATIENT
Start: 2025-01-11

## 2025-01-11 RX ADMIN — PANTOPRAZOLE SODIUM 40 MG: 40 INJECTION, POWDER, FOR SOLUTION INTRAVENOUS at 09:12

## 2025-01-11 RX ADMIN — TRAMADOL HYDROCHLORIDE 50 MG: 50 TABLET, COATED ORAL at 09:12

## 2025-01-11 RX ADMIN — Medication 10 ML: at 09:12

## 2025-01-11 RX ADMIN — POLYETHYLENE GLYCOL 3350 17 G: 17 POWDER, FOR SOLUTION ORAL at 09:12

## 2025-01-11 RX ADMIN — PROGESTERONE 200 MG: 200 CAPSULE ORAL at 09:12

## 2025-01-11 RX ADMIN — HYDROMORPHONE HYDROCHLORIDE 0.5 MG: 1 INJECTION, SOLUTION INTRAMUSCULAR; INTRAVENOUS; SUBCUTANEOUS at 01:34

## 2025-01-11 RX ADMIN — ONDANSETRON 4 MG: 2 INJECTION INTRAMUSCULAR; INTRAVENOUS at 01:35

## 2025-01-11 RX ADMIN — BISACODYL 5 MG: 5 TABLET, COATED ORAL at 09:11

## 2025-01-11 RX ADMIN — TRAMADOL HYDROCHLORIDE 50 MG: 50 TABLET, COATED ORAL at 03:15

## 2025-01-11 NOTE — DISCHARGE INSTRUCTIONS
Call the office to schedule followup appointment approx 2 wks postop  Keep incisions dry for first 24-48 hrs then may remove overlying bandages but leave white steristrips in place  May shower soap and water after bandages are removed but no baths/soaking x 2 weeks  Recommend low fat diet x 2 wks  No lifting >10-15 lbs x 2 wks  Do not drive or take narcotics  Over the counter stool softener twice daily until off narcotics and having regular bowel movements  Milk of magnesia as needed if still constipated with stool softeners

## 2025-01-11 NOTE — PROGRESS NOTES
General Surgery Progress Note    Name: Alecia Hunter ADMIT: 2025   : 1965  PCP: Margarito Monroe MD    MRN: [id number] LOS: 3 days   AGE/SEX: 59 y.o. female  ROOM: [address] [place] [address]    Chief Complaint   Patient presents with    Abdominal Pain     Subjective     Patient seen and examined. VSS, afebrile. Overall, doing well. Reports pain is controlled with tramadol. Tolerating low fat diet without nausea or vomiting. Passing some flatus.    Objective     Scheduled Medications:       Active Infusions:  No current facility-administered medications for this encounter.      As Needed Medications:      Vital Signs  Vital Signs Patient Vitals for the past 24 hrs:   BP Temp Temp src Pulse Resp SpO2   25 0849 151/89 98.3 °F (36.8 °C) Oral 83 17 91 %   25 0307 126/89 98.1 °F (36.7 °C) Oral 89 16 95 %   01/10/25 2357 133/82 97.9 °F (36.6 °C) Oral 99 16 94 %   01/10/25 2140 141/97 -- -- 92 -- 95 %   01/10/25 2005 156/90 97.9 °F (36.6 °C) Oral 85 -- 95 %   01/10/25 1850 157/80 97.8 °F (36.6 °C) Oral 81 15 97 %   01/10/25 1835 165/94 -- -- 85 14 100 %   01/10/25 1820 157/93 -- -- 85 8 99 %   01/10/25 1805 160/90 -- -- 85 10 97 %   01/10/25 1756 159/83 -- -- 85 10 95 %   01/10/25 1750 159/83 -- -- 92 11 93 %   01/10/25 1745 170/91 -- -- 92 11 98 %   01/10/25 1740 170/92 -- -- 94 9 98 %   01/10/25 1735 161/93 97.9 °F (36.6 °C) Oral 91 14 100 %     I/O:  I/O last 3 completed shifts:  In: 1000 [I.V.:1000]  Out: -     Physical Exam:  Physical Exam  Constitutional:       General: She is not in acute distress.  Cardiovascular:      Rate and Rhythm: Normal rate.   Pulmonary:      Effort: Pulmonary effort is normal. No respiratory distress.   Abdominal:      General: There is no distension.      Palpations: Abdomen is soft.      Tenderness: There is abdominal tenderness. There is no guarding.      Comments: Soft, non distended, appropriate tenderness. Incisions intact with steri strips and Band-Aids    Neurological:      Mental Status: She is alert. Mental status is at baseline.   Psychiatric:         Mood and Affect: Mood normal.         Behavior: Behavior normal.         Results Review:     CBC    Results from last 7 days   Lab Units 01/11/25 0323 01/10/25  0336 01/08/25 1937   WBC 10*3/mm3 12.81* 7.16 12.95*   HEMOGLOBIN g/dL 13.2 13.2 16.8*   PLATELETS 10*3/mm3 315 335 371     BMP   Results from last 7 days   Lab Units 01/11/25  0323 01/10/25  0336 01/08/25  1937   SODIUM mmol/L 137 139 134*   POTASSIUM mmol/L 4.3 4.0 4.5   CHLORIDE mmol/L 103 103 92*   CO2 mmol/L 22.2 25.9 18.1*   BUN mg/dL 8 6* 11   CREATININE mg/dL 0.81 0.75 1.10*   GLUCOSE mg/dL 118* 84 120*     Radiology(recent) No radiology results for the last day    I reviewed the patient's new clinical results.    Assessment & Plan       Cholecystitis    Gastric bypass status for obesity    Hypertension    SHABANA (obstructive sleep apnea)    Lactic acidosis      59 y.o. female POD 1 status post robotic assisted laparoscopic cholecystectomy and extensive lysis of adhesions    Diet as tolerated  Antiemetics and pain meds as needed  Needs to ambulate as much as possible, out of bed to chair  Use IS bedside 10x/hr while awake  Overall, appears to be doing well. Ok to discharge from GS perspective. Will follow up in office in 2 weeks        This note was created using Dragon Voice Recognition software.    EVI Iraheta  01/11/25  16:56 EST

## 2025-01-11 NOTE — PLAN OF CARE
Goal Outcome Evaluation:  Plan of Care Reviewed With: patient, spouse        Progress: improving  Outcome Evaluation: Pt d/c home.  Pain medications and zofran sent to preferred pharmacy.

## 2025-01-11 NOTE — OP NOTE
Operative Report    Patient Name:  Alecia Hunter  YOB: 1965    Date of Surgery:  1/10/2025    Pre-op Diagnosis:   Acute calculus cholecystitis       Pre-op Diagnosis:   Acute calculus cholecystitis, extensive intra-abdominal adhesions     Procedure(s):  1) extensive laparoscopic and robotic assisted laparoscopic lysis of adhesions  (for greater than 60 minutes )  2) robotic assisted laparoscopic cholecystectomy     Staff:  Surgeon(s):  Lora Urena MD    Circulator: Janelle Morales RN; Hayley Mora RN  Scrub Person: Niru Napier; Geri Morse  Assistant: Gil Pinedo CSA  was responsible for performing the following activities: Closing, Placing Dressing, Held/Positioned Camera, and bedside assist robotics case  and their skilled assistance was necessary for the success of this case.    Anesthesia: General    Estimated Blood Loss:  25 mL    Implants:    None    Specimen:          Specimens       ID Source Type Tests Collected By Collected At Frozen?    A Gallbladder Tissue TISSUE PATHOLOGY EXAM   Lora Urena MD 1/10/25 3727             Findings: Extensive intra-abdominal adhesions with significant adherent small bowel and omentum to the anterior abdominal wall in the upper abdomen.  Mild gallbladder edema consistent with cholecystitis.  Critical view obtained.    Complications: Small serosal tear to small bowel made during lysis of adhesions, very imbricated with 3-0 Vicryl suture.    Clinical Indications: The patient is a 59 year old female with right upper quadrant pain. Workup showed acute cholecystitis and cholecystectomy was offered. The surgery along with the associated risks, benefits, and alternatives were discussed with the patient. The risks include but are not limited to bleeding, infection, hernia, conversion to open, and common bile duct injury requiring additional procedures.  Preoperatively I discussed with the patient she is at increased risk  for injury to intra-abdominal structures such as bowel as well as conversion to open given prior history of multiple laparotomies.  The patient expressed understanding and wished to proceed. Informed consent was obtained.    Description of Procedure:  The patient was brought to the operating room and placed in the supine position with both arms out. Bilateral sequential compression stockings placed on the lower extremities. The patient was induced and intubated by the Anesthesia service. Perioperative antibiotics were administered. The patient's abdomen was then prepped and draped in the usual sterile fashion. A time out was performed.    Due to prior history of open gastric bypass surgery as well as laparotomy for small bowel obstruction I elected to attempt entry into the abdomen in the right upper quadrant hoping there would be minimal adhesions in this area.  We made a small stab incision in the left upper quadrant at [facility]. A water drop test was performed indicating we were likely intra-abdominal. Insufflation was initiated but the opening pressure was high so insufflation was stopped and the Veress needle was removed.  We attempted Veress needle placement a second time and intra-abdominal pressure was still elevated.  I then attempted to gain entry in the left upper quadrant at Raman's point in a similar fashion.  On the left side the opening pressure was low indicating we are likely intra-abdominal and the abdomen did appear to insufflate though there was not significant abdominal distention likely secondary to the patient's history of prior abdominoplasty.  We placed a 5 mm optical trocar in the patient's left upper quadrant.   The camera was introduced and the abdomen was inspected to ensure we had not caused any injuries however there were extensive surrounding adhesions in the upper abdomen that we were unable to actually see the Veress needle so we removed it.  The patient had extensive  abdominal adhesions in the abdomen above the level of the umbilicus.  Direct visualization we placed an 8 mm robotic trocar in the midline several centimeters below the umbilicus where the abdomen appeared free of adhesions.  We placed an additional 8 mm trocar 1 handbreadth to the right of this trocar.  Between the 3 trocars we performed careful meticulous laparoscopic lysis of adhesions to mobilize the densely adherent omentum and small bowel from the anterior abdominal wall.  We continued to lyse adhesions until we were able to free enough space in the left abdomen to place an additional 8 mm robotic trocar approximately 1 handbreadth to the left of the umbilicus near the level of the infraumbilical trocar.  At this point we elected to docked the robot with the 3 robotic arms as performing lysis of adhesions would be robotically.  We continued to proceed with lysis of adhesions freeing the adherent small bowel and omentum from the anterior abdominal wall.  During the dissection when bluntly sweeping the small bowel there was a small serosal tear that was created.  The area was carefully inspected and did not appear full-thickness and appeared only to be serosal.  The area was imbricated with three Lembert stitches using 3-0 Vicryl suture.  The area appeared well repaired.  At no time was there and never any of full-thickness injury or leakage of enteric contents.  We proceeded with continued lysis of adhesions until all the small bowel was freed from the anterior abdominal wall and a significant amount of the omentum was freed from the abdominal wall to allow access to the right upper quadrant.  Once adequate exposure was achieved to proceed with cholecystectomy a 8 mm trocar was placed in the left mid abdomen under direct visualization and the fourth arm was docked.  The patient was then placed in reverse Trendelenburg with her right side up.    The fundus of the gallbladder was retracted cephalad and laterally.  Peritoneal attachments were taken down staying high on the gallbladder and away from the common bile duct. The cystic artery and cystic duct were dissected out and the critical view was obtained.  The inferior half of the gallbladder was dissected away from the liver revealing only two structures going directly to the gallbladder.   The patient had been administered indocyanine green and the structures we had identified as the cystic duct and artery did not show green indicating it was not the common bile duct and we were well away from it.  The common bile duct was likely covered within the surrounding fat and was away from our area of dissection.  The cystic artery and duct were clipped and divided. The gallbladder was then dissected from the gallbladder fossa with electrocautery. The gallbladder fossa was inspected and was hemostatic. The clips were inspected and were in good position. The gallbladder was placed in an endocatch bag and removed through the trocar site to the left of the umbilicus.  The fascial incision had to be bluntly expanded with a hemostat to allow removal of the gallbladder.  The fascial incision was closed with a transfascial stitch using 0 vicryl suture and the laparoscopic port closure device.  The fascial closure was checked and appeared intact.  Prior serosal repair was reexamined and there was no evidence of any leakage at this time.  The robot was undocked and the remaining ports were removed under direct visualization except for the port in the midline and all were hemostatic. Pneumoperitoneum was released and the supraumbilical trocar was removed.  The incisions were closed with 4-0 vicryl, cleaned, and dressed with sterile dressings.    The patient tolerated the procedure well.  She was awakened and extubated by anesthesia and then transferred to the recovery room in stable condition. At the completion of the case, all instrument, needle, and sponge counts were correct.     Lora  MD Romel     Date: 1/11/2025  Time: 17:21 EST    This note was created using Dragon Voice Recognition software.

## 2025-01-12 ENCOUNTER — READMISSION MANAGEMENT (OUTPATIENT)
Dept: CALL CENTER | Facility: HOSPITAL | Age: 60
End: 2025-01-12
Payer: COMMERCIAL

## 2025-01-12 NOTE — OUTREACH NOTE
Prep Survey      Flowsheet Row Responses   [facility] patient discharged from? [facility]   Is LACE score < 7 ? No   Eligibility Readm Mgmt   Discharge diagnosis CHOLECYSTECTOMY LAPAROSCOPIC AND EXTENSIVE LYSIS OF ADHESIONS WITH DAVINCI ROBOT   Does the patient have one of the following disease processes/diagnoses(primary or secondary)? General Surgery   Prep survey completed? Yes            Becka GARCES - Registered Nurse

## 2025-01-13 ENCOUNTER — TELEPHONE (OUTPATIENT)
Dept: SURGERY | Facility: CLINIC | Age: 60
End: 2025-01-13
Payer: COMMERCIAL

## 2025-01-13 LAB
BACTERIA SPEC AEROBE CULT: NORMAL
BACTERIA SPEC AEROBE CULT: NORMAL

## 2025-01-13 NOTE — PAYOR COMM NOTE
"NOTIFICATION OF DISCHARGE:          AUTH # [id number]   Alecia Patel (59 y.o. Female) 1965      DISCHARGED TO HOME ON 01/11/2025.      THANKS,                 Mary Hassan, RN MSN  /UR  [facility]  [phone] office  [phone] fax  [email or web address]    [facility]  NPI: [id number]  Tax: [id number]          Alecia Patel (59 y.o. Female)       Date of Birth   1965    Social Security Number       Address   [address] [place] [address]    Home Phone   [phone]    MRN   [id number]       L[employer] day   [employer]    Marital Status                               Admission Date   1/8/25    Admission Type   Emergency    Admitting Provider   Delvin Ramsey MD    Attending Provider       Department, Room/Bed   [facility] OBSERVATION, 103/1       Discharge Date   1/11/2025    Discharge Disposition   Home or Self Care    Discharge Destination   Home                              Attending Provider: (none)   Allergies: Losartan    Isolation: None   Infection: None   Code Status: Prior    Ht: 167.6 cm (65.98\")   Wt: 69.9 kg (154 lb)    Admission Cmt: None   Principal Problem: Cholecystitis [K81.9]                   Active Insurance as of 1/8/2025       Primary Coverage       Payor Plan Insurance Group Employer/Plan Group    ANTHEM BLUE CROSS ANTHEM BLUE CROSS BLUE SHIELD PPO [id number]       Payor Plan Address Payor Plan Phone Number Payor Plan Fax Number Effective Dates    [address] [phone]  6/21/2020 - None Entered    [facility] [id number]         Subscriber Name Subscriber Birth Date Member ID       WOODY VEGA [birth year]1963 [id number]                     Emergency Contacts        (Rel.) Home Phone Work Phone Mobile Phone    Woody Vega (Spouse) [phone] -- --    Isabella Baltazar (Daughter) [phone] -- --    FreddySara (Daughter) [phone] -- --               " "  Discharge Summary        Michoacano Osuna MD at 25 1116                       [facility]  Discharge Summary    Date of Service: 2025  Patient Name: Alecia Hunter  : 1965  MRN: [id number]    Date of Admission: 2025  Discharge Diagnosis: Cholecystitis  Date of Discharge: 2025  Primary Care Physician: Margarito Monroe MD      Presenting Problem:   Cholecystitis [K81.9]  Epigastric pain [R10.13]  Common bile duct dilation [K83.8]    Active and Resolved Hospital Problems:  Active Hospital Problems    Diagnosis POA    **Cholecystitis [K81.9] Yes    Lactic acidosis [E87.20] Yes    SHABANA (obstructive sleep apnea) [G47.33] Yes    Hypertension [I10] Yes    Gastric bypass status for obesity [Z98.84] Not Applicable      Resolved Hospital Problems   No resolved problems to display.         Hospital Course     HPI:    \"Alecia Hunter is a 59 y.o. female with a past medical history of gastric bypass surgery, obstructive sleep apnea, hypothyroid, questionable diabetes, obstructive sleep apnea, hypertension, and obesity.  The patient was her usual state of health until earlier today she started having some severe abdominal pain was upper epigastric and on her left side radiated to her back and between her shoulder blades and then also went into the upper epigastric area.  The pain was severe.  And then she had a significant bouts of vomiting.  This is whenever it was decided to have EMS bring her to the emergency room.  The patient in the emergency room had a mild leukocytosis and an elevated lactic acid close a dilated common bile duct but no stone was seen.  There was some question about whether there was some inflammation or any signs of cholecystitis.  The patient was given fluids and started empirically on ceftriaxone and Flagyl.  Pain has improved she has not had any further nausea and her lactic acid has returned to normal.  A right upper quadrant " "ultrasound has been ordered to further evaluate for potential stones.  And will have GI evaluate the patient tomorrow.  So concerns for possible cholecystitis with an elevated lactic acid appears to be improving over the further evaluation and tomorrow will determine the course of action.  What complicates her GI system is the fact that she has had bypass surgery so uncertain that the common bile duct may be dilated on a normal basis. \"    Hospital Course:    Patient admitted for management of acute cholecystitis.  Underwent laparoscopic cholecystectomy on 1/10/2025.  Followed normal postoperative course and did very well afterwards.  Discharged on 01/11/2025 with normal diet and pain control.        DISCHARGE Follow Up Recommendations for labs and diagnostics:     General Surgery  PCP  Home MiraLAX for bowel regimen as discussed        Day of Discharge     Vital Signs:  Temp:  [97.8 °F (36.6 °C)-98.3 °F (36.8 °C)] 98.3 °F (36.8 °C)  Heart Rate:  [76-99] 83  Resp:  [8-17] 17  BP: (126-170)/(80-97) 151/89  Flow (L/min) (Oxygen Therapy):  [3-4] 3    Physical Exam:  Physical Exam  Constitutional:       Appearance: Normal appearance.   Cardiovascular:      Rate and Rhythm: Normal rate and regular rhythm.      Pulses: Normal pulses.      Heart sounds: Normal heart sounds.   Pulmonary:      Effort: Pulmonary effort is normal.      Breath sounds: Normal breath sounds.   Abdominal:      General: Abdomen is flat.      Palpations: Abdomen is soft.   Neurological:      General: No focal deficit present.      Mental Status: She is alert and oriented to person, place, and time.            Pertinent  and/or Most Recent Results     LAB RESULTS:      Lab 01/11/25  0323 01/10/25  0336 01/08/25  2239 01/08/25  1945 01/08/25  1937   WBC 12.81* 7.16  --   --  12.95*   HEMOGLOBIN 13.2 13.2  --   --  16.8*   HEMATOCRIT 41.7 41.6  --   --  49.2*   PLATELETS 315 335  --   --  371   NEUTROS ABS  --  4.37  --   --  8.86*   IMMATURE GRANS " (ABS)  --  0.02  --   --  0.04   LYMPHS ABS  --  1.80  --   --  3.03   MONOS ABS  --  0.67  --   --  0.94*   EOS ABS  --  0.25  --   --  0.03   MCV 95.0 95.0  --   --  91.3   LACTATE  --   --  0.9 3.2*  --          Lab 01/11/25 0323 01/10/25  0336 01/09/25 0418 01/08/25  1937   SODIUM 137 139  --  134*   POTASSIUM 4.3 4.0  --  4.5   CHLORIDE 103 103  --  92*   CO2 22.2 25.9  --  18.1*   ANION GAP 11.8 10.1  --  23.9*   BUN 8 6*  --  11   CREATININE 0.81 0.75  --  1.10*   EGFR 83.2 91.3  --  57.6*   GLUCOSE 118* 84  --  120*   CALCIUM 9.3 9.3  --  10.9*   HEMOGLOBIN A1C  --   --  5.32  --          Lab 01/11/25  0323 01/10/25  0336 01/08/25 1937   TOTAL PROTEIN 6.7 6.5 9.5*   ALBUMIN 3.7 3.8 5.1   GLOBULIN 3.0 2.7 4.4   ALT (SGPT) 59* 40* 17   AST (SGOT) 144* 74* 42*   BILIRUBIN 0.2 0.4 0.6   ALK PHOS 227* 197* 147*   LIPASE  --   --  37                     Brief Urine Lab Results  (Last result in the past 365 days)        Color   Clarity   Blood   Leuk Est   Nitrite   Protein   CREAT   Urine HCG        01/08/25 2017 Yellow   Clear   Negative   Negative   Negative   Negative                 Microbiology Results (last 10 days)       Procedure Component Value - Date/Time    Respiratory Panel PCR w/COVID-19(SARS-CoV-2) [facility] In-House, NP Swab in UTM/VTM, 2 HR TAT - Swab, Nasopharynx [[id number]]  (Normal) Collected: 01/09/25 0000    Lab Status: Final result Specimen: Swab from Nasopharynx Updated: 01/09/25 0238     ADENOVIRUS, PCR Not Detected     Coronavirus 229E Not Detected     Coronavirus HKU1 Not Detected     Coronavirus NL63 Not Detected     Coronavirus OC43 Not Detected     COVID19 Not Detected     Human Metapneumovirus Not Detected     Human Rhinovirus/Enterovirus Not Detected     Influenza A PCR Not Detected     Influenza B PCR Not Detected     Parainfluenza Virus 1 Not Detected     Parainfluenza Virus 2 Not Detected     Parainfluenza Virus 3 Not Detected     Parainfluenza Virus 4 Not  Detected     RSV, PCR Not Detected     Bordetella pertussis pcr Not Detected     Bordetella parapertussis PCR Not Detected     Chlamydophila pneumoniae PCR Not Detected     Mycoplasma pneumo by PCR Not Detected    Narrative:      In the setting of a positive respiratory panel with a viral infection PLUS a negative procalcitonin without other underlying concern for bacterial infection, consider observing off antibiotics or discontinuation of antibiotics and continue supportive care. If the respiratory panel is positive for atypical bacterial infection (Bordetella pertussis, Chlamydophila pneumoniae, or Mycoplasma pneumoniae), consider antibiotic de-escalation to target atypical bacterial infection.    Blood Culture - Blood, Hand, Left [[id number]]  (Normal) Collected: 01/08/25 2010    Lab Status: Preliminary result Specimen: Blood from Hand, Left Updated: 01/10/25 2030     Blood Culture No growth at 2 days    Blood Culture - Blood, Arm, Left [[id number]]  (Normal) Collected: 01/08/25 1936    Lab Status: Preliminary result Specimen: Blood from Arm, Left Updated: 01/10/25 1946     Blood Culture No growth at 2 days    Narrative:      Less than seven (7) mL's of blood was collected.  Insufficient quantity may yield false negative results.            US Abdomen Limited    Result Date: 1/9/2025  Impression: Impression: Gallbladder sludge with gallbladder wall thickening and pericholecystic fluid. There is extrahepatic biliary ductal dilatation. Electronically Signed: Nathen Rodarte MD  1/9/2025 6:05 AM EST  Workstation ID: [id number]    CT Abdomen Pelvis With Contrast    Result Date: 1/8/2025  Impression: Impression: 1.Abnormal dilatation of the common hepatic and common bile duct measuring up to 1.5 cm in diameter. There is no definite choledocholithiasis. I would recommend correlation with liver enzyme levels. 2.Small uterine fibroids. 3.Grade 1 anterior spondylolisthesis of L5 on S1 secondary to chronic pars defects.  4.Additional findings as noted above. Electronically Signed: Margarito Damon MD  1/8/2025 9:05 PM EST  Workstation ID: [id number]     Results for orders placed during the hospital encounter of 04/02/24    Duplex Renal Artery - Bilateral Complete CAR    Interpretation Summary    Normal right renal artery.    Normal left renal artery.      Results for orders placed during the hospital encounter of 04/02/24    Duplex Renal Artery - Bilateral Complete CAR    Interpretation Summary    Normal right renal artery.    Normal left renal artery.      Results for orders placed during the hospital encounter of 07/13/22    Adult Transthoracic Echo Complete W/ Cont if Necessary Per Protocol    Interpretation Summary  · Left ventricular ejection fraction appears to be 61 - 65%. Left ventricular systolic function is normal. Normal left ventricular cavity size and wall thickness noted. All left ventricular wall segments contract normally. Left ventricular diastolic function was normal. No evidence of left ventricular thrombus or mass present.  · Calcified atheroma noted in the proximal ascending aorta      Labs Pending at Discharge:  Pending Results       Procedure [Order ID] Specimen - Date/Time    Tissue Pathology Exam [[id number]] Collected: 01/10/25 1554    Specimen: Tissue from Gallbladder Updated: 01/10/25 1819            Procedures Performed  Procedure(s):  CHOLECYSTECTOMY LAPAROSCOPIC AND EXTENSIVE LYSIS OF ADHESIONS WITH DAVINCI ROBOT         Consults:   Consults       Date and Time Order Name Status Description    1/9/2025  9:33 AM Inpatient General Surgery Consult Completed     1/8/2025 11:24 PM Inpatient Gastroenterology Consult Completed               Discharge Details        Discharge Medications        New Medications        Instructions Start Date   HYDROcodone-acetaminophen 5-325 MG per tablet  Commonly known as: Norco   1 tablet, Oral, Every 6 Hours PRN      naloxone 4 MG/0.1ML nasal spray  Commonly known as: NARCAN   Call  "911. Don't prime. Spray in 1 nostril for overdose. Repeat in 2-3 minutes in other nostril if no or minimal breathing/responsiveness.      ondansetron ODT 4 MG disintegrating tablet  Commonly known as: ZOFRAN-ODT   4 mg, Oral, Every 6 Hours PRN      traMADol 50 MG tablet  Commonly known as: ULTRAM  Replaces: traMADol  MG 24 hr tablet   50 mg, Oral, Every 6 Hours PRN             Continue These Medications        Instructions Start Date   Airborne Elderberry 100-50 MG chewable tablet   Chew.      amLODIPine 2.5 MG tablet  Commonly known as: NORVASC   2.5 mg, Oral, Daily      amLODIPine 5 MG tablet  Commonly known as: NORVASC   5 mg, Daily      aspirin 81 MG EC tablet   81 mg, Oral, Daily      busPIRone 5 MG tablet  Commonly known as: BUSPAR   10 mg, Oral, 3 Times Daily      cholecalciferol 25 MCG (1000 UT) tablet  Commonly known as: VITAMIN D3   2,000 Units, Daily      cyanocobalamin 1000 MCG/ML injection   1,000 mcg, Intramuscular, Weekly      estradiol 0.05 MG/24HR patch  Commonly known as: CLIMARA   1 patch      hydrOXYzine 25 MG tablet  Commonly known as: ATARAX   25 mg      levothyroxine 25 MCG tablet  Commonly known as: SYNTHROID, LEVOTHROID   TAKE 1 TABLET BY MOUTH DAILY      meloxicam 15 MG tablet  Commonly known as: MOBIC   1 tablet, Daily      metoprolol succinate  MG 24 hr tablet  Commonly known as: TOPROL-XL   100 mg, Oral, Daily      Ozempic (0.25 or 0.5 MG/DOSE) 2 MG/1.5ML solution pen-injector  Generic drug: Semaglutide(0.25 or 0.5MG/DOS)   INJECT 0.25MG INTO THE SKIN ONCE WEEKLY X 4 DOSES THEN 0.5MG WEEKLY      Progesterone 200 MG capsule  Commonly known as: PROMETRIUM   200 mg, Daily      Semaglutide-Weight Management 0.5 MG/0.5ML solution auto-injector   0.5 mL, Weekly      Syringe/Needle (Disp) 23G X 1\" 3 ML misc   Use to inject vitamin b12 weekly      valACYclovir 500 MG tablet  Commonly known as: Valtrex   500 mg, Oral, Daily      zolpidem 5 MG tablet  Commonly known as: AMBIEN   5 mg, " Nightly PRN             Stop These Medications      traMADol  MG 24 hr tablet  Commonly known as: ULTRAM-ER  Replaced by: traMADol 50 MG tablet              Allergies   Allergen Reactions    Losartan Other (See Comments)     ZACKARY 10/2023         Discharge Disposition:   Home or Self Care    Diet:  Hospital:  Diet Order   Procedures    Diet: Gastrointestinal; Fiber-Restricted; Fluid Consistency: Thin (IDDSI 0)         Discharge Activity:         CODE STATUS:  Code Status and Medical Interventions: CPR (Attempt to Resuscitate); Full Support   Ordered at: 01/08/25 2147     Level Of Support Discussed With:    Patient     Code Status (Patient has no pulse and is not breathing):    CPR (Attempt to Resuscitate)     Medical Interventions (Patient has pulse or is breathing):    Full Support         Future Appointments   Date Time Provider Department Center   7/15/2025 12:30 PM Delvin Roman Jr., MD [facility]   8/26/2025 10:15 AM Estevan Olvera MD [facility] None           Time spent on Discharge including face to face service:  45 minutes    Signature: Electronically signed by Araceli Osuna MD, 01/11/25, 11:49 EST.  [facility] Hospitalist Team      Electronically signed by Araceli Osuna MD at 01/11/25 1150       Discharge Order (From admission, onward)       Start     Ordered    01/11/25 0939  Discharge patient  Once        Expected Discharge Date: 01/11/25   Discharge Disposition: Home or Self Care   Physician of Record for Attribution - Please select from Treatment Team: ARACELI OSUNA [[id number]]   Review needed by CMO to determine Physician of Record: No      Question Answer Comment   Physician of Record for Attribution - Please select from Treatment Team ARACELI OSUNA    Review needed by CMO to determine Physician of Record No        01/11/25 9635

## 2025-01-13 NOTE — CASE MANAGEMENT/SOCIAL WORK
Case Management Discharge Note      Final Note: Routine home         Selected Continued Care - Discharged on 1/11/2025 Admission date: 1/8/2025 - Discharge disposition: Home or Self Care         Transportation Services  Private: Car    Final Discharge Disposition Code: 01 - home or self-care

## 2025-01-14 LAB
LAB AP CASE REPORT: NORMAL
PATH REPORT.FINAL DX SPEC: NORMAL
PATH REPORT.GROSS SPEC: NORMAL

## 2025-01-15 ENCOUNTER — TELEPHONE (OUTPATIENT)
Dept: SURGERY | Facility: CLINIC | Age: 60
End: 2025-01-15
Payer: COMMERCIAL

## 2025-01-15 RX ORDER — FLUCONAZOLE 150 MG/1
150 TABLET ORAL
Qty: 2 TABLET | Refills: 0 | Status: SHIPPED | OUTPATIENT
Start: 2025-01-15

## 2025-01-15 RX ORDER — FLUCONAZOLE 100 MG/1
150 TABLET ORAL
Qty: 2 TABLET | Refills: 0 | Status: SHIPPED | OUTPATIENT
Start: 2025-01-15 | End: 2025-01-15

## 2025-01-15 NOTE — TELEPHONE ENCOUNTER
patient returned post op call to book post op appt. she states he has a yeast infection from all the abx in the hospital. will you treat? francesca sierra 1-10-25 pharm [facility]   Dr. Urena sent in medication and patient informed.

## 2025-01-15 NOTE — TELEPHONE ENCOUNTER
Call placed to patient to follow up after surgery and to schedule follow up appointment with Porsha Arana PA-C. Left message on machine, encouraged to call to schedule appt and with any questions or concerns related to surgery.

## 2025-01-16 ENCOUNTER — READMISSION MANAGEMENT (OUTPATIENT)
Dept: CALL CENTER | Facility: HOSPITAL | Age: 60
End: 2025-01-16
Payer: COMMERCIAL

## 2025-01-16 NOTE — OUTREACH NOTE
General Surgery Week 1 Survey      Flowsheet Row Responses   [facility] patient discharged from? [facility]   Does the patient have one of the following disease processes/diagnoses(primary or secondary)? General Surgery   Week 1 attempt successful? Yes   Call start time 1325   Call end time 1343   Discharge diagnosis CHOLECYSTECTOMY LAPAROSCOPIC AND EXTENSIVE LYSIS OF ADHESIONS WITH DAVINCI ROBOT   Meds reviewed with patient/caregiver? Yes   Is the patient having any side effects they believe may be caused by any medication additions or changes? No   Does the patient have all medications related to this admission filled (includes all antibiotics, pain medications, etc.) Yes   Is the patient taking all medications as directed (includes completed medication regime)? Yes   Does the patient have a follow up appointment scheduled with their surgeon? Yes  [1/27/25]   Has the patient kept scheduled appointments due by today? N/A   Comments PCP appt on 2/26/25 @1:20pm   Has home health visited the patient within 72 hours of discharge? N/A   Psychosocial issues? No   Did the patient receive a copy of their discharge instructions? Yes   Nursing interventions Reviewed instructions with patient   What is the patient's perception of their health status since discharge? Improving  [Pt has not had a BM since d/c. Is taking liquid dulcolax daily, is passing gas, denies bloating, n/v at this time]   Nursing interventions Nurse provided patient education   Is the patient /caregiver able to teach back basic post-op care? Lifting as instructed by MD in discharge instructions, Keep incision areas clean,dry and protected, No tub bath, swimming, or hot tub until instructed by MD, Take showers only when approved by MD-sponge bathe until then   Is the patient/caregiver able to teach back signs and symptoms of incisional infection? Increased drainage or bleeding, Increased redness, swelling or pain at the incisonal site   Is the  patient/caregiver able to teach back steps to recovery at home? Eat a well-balance diet, Set small, achievable goals for return to baseline health   Is the patient/caregiver able to teach back the hierarchy of who to call/visit for symptoms/problems? PCP, Specialist, Home health nurse, Urgent Care, ED, 911 Yes   Additional teach back comments Enc pt to push fluids, ambulate, call Dr if no BM in a couple days, start a stool softner, watch for abd distention or pain, inablilty to pass gas, n/v. Seek medical care if symptoms arise. Pt VU   Week 1 call completed? Yes   Is the patient interested in additional calls from an ambulatory ? No   Would this patient benefit from a Referral to [facility] Social Work? No   Call end time 1343            Cait DICKERSON - Registered Nurse

## 2025-01-27 ENCOUNTER — OFFICE VISIT (OUTPATIENT)
Dept: SURGERY | Facility: CLINIC | Age: 60
End: 2025-01-27
Payer: COMMERCIAL

## 2025-01-27 VITALS
WEIGHT: 175 LBS | HEART RATE: 79 BPM | DIASTOLIC BLOOD PRESSURE: 77 MMHG | SYSTOLIC BLOOD PRESSURE: 119 MMHG | HEIGHT: 66 IN | OXYGEN SATURATION: 98 % | TEMPERATURE: 97.7 F | BODY MASS INDEX: 28.12 KG/M2

## 2025-01-27 DIAGNOSIS — K81.9 CHOLECYSTITIS: Primary | ICD-10-CM

## 2025-01-27 PROCEDURE — 99024 POSTOP FOLLOW-UP VISIT: CPT

## 2025-01-27 RX ORDER — TIZANIDINE 2 MG/1
TABLET ORAL
COMMUNITY
Start: 2024-12-17

## 2025-01-27 NOTE — PROGRESS NOTES
"Chief Complaint  Post-op (PO Canelo lap Debbie w/ lysis of adhesions 1/10/25)    Subjective        Alecia Hunter presents to [facility] GENER[facility] status post robotic assisted laparoscopic cholecystectomy with lysis of adhesions with Dr. Urena on 1/10/2025.  Overall doing well, no complaints.  Denies pain.  Denies fevers and chills.  Tolerating regular diet without nausea or vomiting.    Objective   Vital Signs:  /77 (BP Location: Right arm, Patient Position: Sitting, Cuff Size: Adult)   Pulse 79   Temp 97.7 °F (36.5 °C) (Infrared)   Ht 167.6 cm (66\")   Wt 79.4 kg (175 lb)   SpO2 98%   BMI 28.25 kg/m²   Estimated body mass index is 28.25 kg/m² as calculated from the following:    Height as of this encounter: 167.6 cm (66\").    Weight as of this encounter: 79.4 kg (175 lb).            Physical Exam  Constitutional:       General: She is not in acute distress.  Cardiovascular:      Rate and Rhythm: Normal rate.   Pulmonary:      Effort: Pulmonary effort is normal. No respiratory distress.   Abdominal:      General: There is no distension.      Palpations: Abdomen is soft.      Tenderness: There is no abdominal tenderness. There is no guarding.      Comments: Incisions appear to be healing well.   Neurological:      Mental Status: She is alert. Mental status is at baseline.   Psychiatric:         Mood and Affect: Mood normal.         Behavior: Behavior normal.        Result Review :                Assessment and Plan   Diagnoses and all orders for this visit:    1. Cholecystitis (Primary)    status post robotic assisted laparoscopic cholecystectomy with lysis of adhesions with Dr. Urena on 1/10/2025.  Overall doing well, no complaints.  Denies pain.  Denies fevers and chills.  Tolerating regular diet without nausea or vomiting.  Pathology discussed with patient.  No further physical restrictions.  No dietary restrictions.  Can follow-up as needed         Follow Up   No " follow-ups on file.  Patient was given instructions and counseling regarding her condition or for health maintenance advice. Please see specific information pulled into the AVS if appropriate.

## 2025-03-11 ENCOUNTER — TELEPHONE (OUTPATIENT)
Dept: NEUROSURGERY | Facility: CLINIC | Age: 60
End: 2025-03-11
Payer: COMMERCIAL

## 2025-03-11 NOTE — TELEPHONE ENCOUNTER
Called the patient to see when she would like to get scheduled for surgery as Dr. Loredo said she is ok to have spine surgery at any time now given her gallbladder removal 1/2025. She would like to hold off until January 2026. I told her to just call us back once she is ready and we will schedule her follow-up

## 2025-03-13 NOTE — TELEPHONE ENCOUNTER
PATIENT CALLED BACK - DECIDED THAT SHE WANTS TO BE SCHEDULING IN NOVEMBER OR DECEMBER OF THIS YEAR (2025) RATHER THAN WAITING UNTIL JAN 2026.

## 2025-03-13 NOTE — TELEPHONE ENCOUNTER
PATIENT CALLED BACK TO STATE THAT SHE WOULD LIKE HER SX TO BE IN LATE DECEMBER- PREFERABLY AFTER JHOANA - BEFORE THE FIRST OF THE YEAR

## 2025-05-06 ENCOUNTER — APPOINTMENT (OUTPATIENT)
Dept: WOMENS IMAGING | Facility: HOSPITAL | Age: 60
End: 2025-05-06
Payer: COMMERCIAL

## 2025-05-06 PROCEDURE — 77063 BREAST TOMOSYNTHESIS BI: CPT | Performed by: RADIOLOGY

## 2025-05-06 PROCEDURE — 77067 SCR MAMMO BI INCL CAD: CPT | Performed by: RADIOLOGY

## 2025-05-12 RX ORDER — AMLODIPINE BESYLATE 2.5 MG/1
2.5 TABLET ORAL DAILY
Qty: 90 TABLET | Refills: 0 | Status: SHIPPED | OUTPATIENT
Start: 2025-05-12

## 2025-07-08 ENCOUNTER — TELEPHONE (OUTPATIENT)
Dept: NEUROSURGERY | Facility: CLINIC | Age: 60
End: 2025-07-08
Payer: COMMERCIAL

## 2025-07-08 NOTE — TELEPHONE ENCOUNTER
Called the patient back to let her know that any PAT testing will need to be completed closer to surgery. She verbalized understanding.

## 2025-07-08 NOTE — TELEPHONE ENCOUNTER
Provider: DR ELIZABETH     Caller: MCKAYLA VEGA    Relationship to Patient: SELF      Phone Number: [phone]    Reason for Call: PATIENT SEES HER CARDIOLOGIST 7-15-25 SHE IS WONDERING IF THERE IS ANY TESTING OR ANYTHING THAT IS NEEDED THAT SHE CAN GO AHEAD AND HAVE COMPLETED PRIOR TO HER SURGERY IN DECEMBER.    PLEASE CALL PATIENT AND LET HER KNOW.  THANK YOU.

## 2025-07-14 NOTE — PROGRESS NOTES
RM:__________                    PCP:Paradowski, Margarito J, MD                          Last EKG:    :_10/1965                                                                    AGE:59 y.o.      REASON FOR VISIT:____________________________________________________________________________        WT:____________    HT:____________   BP:____________  HR:____________       SMOKER: CURRENT/PPD:___________________ FORMER:______________ NEVER:______________          RECENT HOSPITALIZATIONS OR   ER VISITS:________________________________________________________

## 2025-07-15 ENCOUNTER — OFFICE VISIT (OUTPATIENT)
Dept: CARDIOLOGY | Age: 60
End: 2025-07-15
Payer: COMMERCIAL

## 2025-07-15 VITALS
WEIGHT: 181 LBS | DIASTOLIC BLOOD PRESSURE: 78 MMHG | BODY MASS INDEX: 29.09 KG/M2 | SYSTOLIC BLOOD PRESSURE: 118 MMHG | HEART RATE: 68 BPM | HEIGHT: 66 IN | OXYGEN SATURATION: 95 %

## 2025-07-15 DIAGNOSIS — I70.1 LEFT RENAL ARTERY STENOSIS: ICD-10-CM

## 2025-07-15 DIAGNOSIS — G47.33 OSA (OBSTRUCTIVE SLEEP APNEA): ICD-10-CM

## 2025-07-15 DIAGNOSIS — I15.0 RENOVASCULAR HYPERTENSION: ICD-10-CM

## 2025-07-15 DIAGNOSIS — I10 PRIMARY HYPERTENSION: Primary | ICD-10-CM

## 2025-07-15 PROCEDURE — 99214 OFFICE O/P EST MOD 30 MIN: CPT | Performed by: INTERNAL MEDICINE

## 2025-07-15 NOTE — PROGRESS NOTES
[facility]      Patient Name: Alecia Hunter  :1965  Age: 59 y.o.  Encounter Provider:  Delvin Roman Jr, MD      Chief Complaint:   Chief Complaint   Patient presents with    Renovascular hypertension         HPI  Alecia Hunter is a 59 y.o. female with past medical history of anxiety and hypertension who presents for follow-up evaluation.      Summary of clinic visitation: Patient was noticing worsening feelings of anxiety over the last 6 months.  In May she had a couple of episodes of severe palpitations and dizziness.  She did not seek medical therapy at the time.  In  she had some severe episodes and came to [facility] ER.  There she was found to have severely elevated blood pressure and heart rate and was admitted for cardiac work-up.  Supposedly a positive stress test resulted in cardiac catheterization for which there was no need for intervention.  Records are not available at time of interview.  Since leaving the hospital her blood pressure is somewhat better controlled but still quite labile as is her heart rate.  She denies chest pain or shortness of air.  She works as a  and does a lot of walking during the day.  She has anxiety with being addressed with buspirone.  She has difficulty sleeping wakes up several times per night.  She does not think that she snores but her  wears a CPAP and has never mentioned anything to her.  She is a lifelong non-smoker drinks socially and denies illicit drug use.  Father had heart attack at age 62 but lived into his 80s and  of colon cancer.    Echo performed showing normal EF and no significant valvular heart disease.  Holter performed showing no sustained arrhythmias.  Patient continued to have frequent tachycardia especially in the morning and beta-blocker has been uptitrated.  Blood pressure is under better control but not optimal.  Blood pressure log she brings in shows much  "less variation in blood pressure.  She has been evaluated by sleep clinic and is getting sleep study.  Urine metanephrines were within normal range.  Fair functional capacity with no limiting symptoms.  Looks as though blood pressure average is 150/90 mmHg.  No cardiac complaints at time of interview.    First time I am seeing her in clinic since 2022.  Blood pressure has been better controlled and she is lost about 20 pounds.  Stable mild to moderate left renal artery stenosis.  Physical activity is limited by back pain.  She is considering spine surgery in the fall.  Blood pressure and heart rate are well-controlled today in clinic.  No cardiac complaints at time of interview.    The following portions of the patient's history were reviewed and updated as appropriate: allergies, current medications, past family history, past medical history, past social history, past surgical history and problem list.      Review of Systems   Constitutional: Negative for chills and fever.   HENT:  Negative for hoarse voice and sore throat.    Eyes:  Negative for double vision and photophobia.   Cardiovascular:  Positive for palpitations. Negative for chest pain, leg swelling, near-syncope, orthopnea, paroxysmal nocturnal dyspnea and syncope.   Respiratory:  Negative for cough and wheezing.    Skin:  Negative for poor wound healing and rash.   Musculoskeletal:  Negative for arthritis and joint swelling.   Gastrointestinal:  Negative for bloating, abdominal pain, hematemesis and hematochezia.   Neurological:  Negative for dizziness and focal weakness.   Psychiatric/Behavioral:  Negative for depression and suicidal ideas. The patient is nervous/anxious.        OBJECTIVE:   Vital Signs  Vitals:    07/15/25 1238   BP: 118/78   Pulse: 68   SpO2: 95%     Estimated body mass index is 29.21 kg/m² as calculated from the following:    Height as of this encounter: 167.6 cm (66\").    Weight as of this encounter: 82.1 kg (181 lb).    Vitals " reviewed.   Constitutional:       Appearance: Healthy appearance. Not in distress.   Neck:      Vascular: No JVR. JVD normal.   Pulmonary:      Effort: Pulmonary effort is normal.      Breath sounds: Normal breath sounds. No wheezing. No rhonchi. No rales.   Chest:      Chest wall: Not tender to palpatation.   Cardiovascular:      PMI at left midclavicular line. Normal rate. Regular rhythm. Normal S1. Normal S2.       Murmurs: There is no murmur.      No gallop.  No click. No rub.   Pulses:     Intact distal pulses.   Edema:     Peripheral edema absent.   Abdominal:      General: Bowel sounds are normal.      Palpations: Abdomen is soft.      Tenderness: There is no abdominal tenderness.   Musculoskeletal: Normal range of motion.         General: No tenderness. Skin:     General: Skin is warm and dry.   Neurological:      General: No focal deficit present.      Mental Status: Alert and oriented to person, place and time.         Procedures          ASSESSMENT:     Uncontrolled hypertension  Paroxysmal tachycardia  Anxiety  Dyslipidemia    PLAN OF CARE:     Primary hypertension better controlled at this time after weight loss and optimizing sleeping habits as well as lifestyle modification.  Continue current regimen. -   Paroxysmal tachycardia -as above.  Monitor closely.  She is already on very high dose beta-blocker but tolerating well.  Anxiety -patient continues to work with psychiatry about better control.  Recommend increasing exercise levels.  Sleep study scheduled.  Insomnia  Dyslipidemia    Return to clinic 12 months             Discharge Medications            Accurate as of July 15, 2025 12:40 PM. If you have any questions, ask your nurse or doctor.                Changes to Medications        Instructions Start Date   valACYclovir 500 MG tablet  Commonly known as: Valtrex  What changed: additional instructions   500 mg, Oral, Daily             Continue These Medications        Instructions Start Date  "  Airborne Elderberry 100-50 MG chewable tablet   As Needed      amLODIPine 2.5 MG tablet  Commonly known as: NORVASC   2.5 mg, Oral, Daily      cholecalciferol 25 MCG (1000 UT) tablet  Commonly known as: VITAMIN D3   2,000 Units, Daily      cyanocobalamin 1000 MCG/ML injection   1,000 mcg, Intramuscular, Weekly      estradiol 0.05 MG/24HR patch  Commonly known as: CLIMARA   1 patch      hydrOXYzine 25 MG tablet  Commonly known as: ATARAX   25 mg      levothyroxine 25 MCG tablet  Commonly known as: SYNTHROID, LEVOTHROID   TAKE 1 TABLET BY MOUTH DAILY      meloxicam 15 MG tablet  Commonly known as: MOBIC   1 tablet, Daily      metoprolol succinate  MG 24 hr tablet  Commonly known as: TOPROL-XL   100 mg, Oral, Daily      Ozempic (0.25 or 0.5 MG/DOSE) 2 MG/1.5ML solution pen-injector  Generic drug: Semaglutide(0.25 or 0.5MG/DOS)   INJECT 0.25MG INTO THE SKIN ONCE WEEKLY X 4 DOSES THEN 0.5MG WEEKLY      Progesterone 200 MG capsule  Commonly known as: PROMETRIUM   200 mg, Daily      Semaglutide-Weight Management 0.5 MG/0.5ML solution auto-injector   0.5 mL, Weekly      Syringe/Needle (Disp) 23G X 1\" 3 ML misc   Use to inject vitamin b12 weekly      tiZANidine 2 MG tablet  Commonly known as: ZANAFLEX   TAKE 1 TABLET (2 MG TOTAL) BY MOUTH EVERY 8 (EIGHT) HOURS IF NEEDED FOR MUSCLE SPASMS.      zolpidem 5 MG tablet  Commonly known as: AMBIEN   5 mg, Nightly PRN               Thank you for allowing me to participate in the care of your patient,      Sincerely,   Delvin Roman MD  [facility]  07/15/25  12:40 EDT    "

## 2025-08-13 RX ORDER — AMLODIPINE BESYLATE 2.5 MG/1
2.5 TABLET ORAL DAILY
Qty: 90 TABLET | Refills: 0 | Status: SHIPPED | OUTPATIENT
Start: 2025-08-13

## (undated) DEVICE — LAPAROVUE VISIBILITY SYSTEM LAPAROSCOPIC SOLUTIONS: Brand: LAPAROVUE

## (undated) DEVICE — BG RETRV TISS SUPERBAG INTRO RIP/STOP NLY 5/7MM 140ML MD

## (undated) DEVICE — ARM DRAPE

## (undated) DEVICE — KT SURG TURNOVER 050

## (undated) DEVICE — PAD, GROUNDING, UNIVERSAL, SPLIT, 9': Brand: MEDLINE

## (undated) DEVICE — 3M™ STERI-STRIP™ REINFORCED ADHESIVE SKIN CLOSURES, R1547, 1/2 IN X 4 IN (12 MM X 100 MM), 6 STRIPS/ENVELOPE: Brand: 3M™ STERI-STRIP™

## (undated) DEVICE — TIP COVER ACCESSORY

## (undated) DEVICE — COLUMN DRAPE

## (undated) DEVICE — ANTIBACTERIAL UNDYED BRAIDED (POLYGLACTIN 910), SYNTHETIC ABSORBABLE SUTURE: Brand: COATED VICRYL

## (undated) DEVICE — SOLUTION,WATER,IRRIGATION,1000ML,STERILE: Brand: MEDLINE

## (undated) DEVICE — GLV SURG SENSICARE POLYISPRN W/ALOE PF LF 6.5 GRN STRL

## (undated) DEVICE — LN SMPL CO2 SHTRM SD STREAM W/M LUER

## (undated) DEVICE — TUBING, SUCTION, 1/4" X 10', STRAIGHT: Brand: MEDLINE

## (undated) DEVICE — SUCTION IRRIGATOR: Brand: ENDOWRIST

## (undated) DEVICE — ENDOPATH 5MM CURVED SCISSORS WITH MONOPOLAR CAUTERY: Brand: ENDOPATH

## (undated) DEVICE — SYR LUERLOK 30CC

## (undated) DEVICE — KT ORCA ORCAPOD DISP STRL

## (undated) DEVICE — SEAL

## (undated) DEVICE — TBG INSUFF MALE L/L W 12MM CON: Brand: MEDLINE INDUSTRIES, INC.

## (undated) DEVICE — THE STERILE LIGHT HANDLE COVER IS USED WITH STERIS SURGICAL LIGHTING AND VISUALIZATION SYSTEMS.

## (undated) DEVICE — GAUZE,SPONGE,4"X4",16PLY,XRAY,STRL,LF: Brand: MEDLINE

## (undated) DEVICE — THE STERILE CAMERA HANDLE COVER IS FOR USE WITH THE STERIS SURGICAL LIGHTING AND VISUALIZATION SYSTEMS.

## (undated) DEVICE — ENDOPATH PNEUMONEEDLE INSUFFLATION NEEDLES WITH LUER LOCK CONNECTORS 120MM: Brand: ENDOPATH

## (undated) DEVICE — BLADELESS OBTURATOR: Brand: WECK VISTA

## (undated) DEVICE — PASS SUT PRO BARIATRIC XL W/TROC SWABS

## (undated) DEVICE — CANN O2 ETCO2 FITS ALL CONN CO2 SMPL A/ 7IN DISP LF

## (undated) DEVICE — SENSR O2 OXIMAX FNGR A/ 18IN NONSTR

## (undated) DEVICE — ADAPT CLN BIOGUARD AIR/H2O DISP

## (undated) DEVICE — SUT VIC 0 UR6 27IN VCP603H

## (undated) DEVICE — GLOVE,SURG,SENSICARE SLT,LF,PF,6: Brand: MEDLINE

## (undated) DEVICE — GENERAL LAPAROSCOPY CDS: Brand: MEDLINE INDUSTRIES, INC.

## (undated) DEVICE — ENDOPATH XCEL WITH OPTIVIEW TECHNOLOGY BLADELESS TROCARS WITH STABILITY SLEEVES: Brand: ENDOPATH XCEL OPTIVIEW

## (undated) DEVICE — ENDOPATH XCEL BLADELESS TROCARS WITH STABILITY SLEEVES: Brand: ENDOPATH XCEL

## (undated) DEVICE — BLANKT WARM UPPR/BDY ARM/OUT 57X196CM